# Patient Record
Sex: MALE | Race: ASIAN | NOT HISPANIC OR LATINO | Employment: FULL TIME | URBAN - METROPOLITAN AREA
[De-identification: names, ages, dates, MRNs, and addresses within clinical notes are randomized per-mention and may not be internally consistent; named-entity substitution may affect disease eponyms.]

---

## 2024-06-07 ENCOUNTER — HOSPITAL ENCOUNTER (INPATIENT)
Facility: HOSPITAL | Age: 44
LOS: 4 days | Discharge: ANOTHER HOSPITAL | DRG: 871 | End: 2024-06-11
Attending: STUDENT IN AN ORGANIZED HEALTH CARE EDUCATION/TRAINING PROGRAM | Admitting: INTERNAL MEDICINE
Payer: COMMERCIAL

## 2024-06-07 DIAGNOSIS — R74.8 ELEVATED LIVER ENZYMES: ICD-10-CM

## 2024-06-07 DIAGNOSIS — K75.0 LIVER ABSCESS: ICD-10-CM

## 2024-06-07 DIAGNOSIS — A41.9 SEPSIS, DUE TO UNSPECIFIED ORGANISM, UNSPECIFIED WHETHER ACUTE ORGAN DYSFUNCTION PRESENT: Primary | ICD-10-CM

## 2024-06-07 DIAGNOSIS — E87.20 LACTIC ACIDOSIS: ICD-10-CM

## 2024-06-07 DIAGNOSIS — R00.0 TACHYCARDIA: ICD-10-CM

## 2024-06-07 DIAGNOSIS — R50.9 FEVER: ICD-10-CM

## 2024-06-07 LAB
ACINETOBACTER CALCOACETICUS-BAUMANNII COMPLEX (OHS): NOT DETECTED
ALBUMIN SERPL-MCNC: 2.5 G/DL (ref 3.5–5)
ALBUMIN/GLOB SERPL: 0.6 RATIO (ref 1.1–2)
ALP SERPL-CCNC: 197 UNIT/L (ref 40–150)
ALT SERPL-CCNC: 98 UNIT/L (ref 0–55)
AMORPH URATE CRY URNS QL MICRO: ABNORMAL /UL
ANION GAP SERPL CALC-SCNC: 14 MEQ/L
AST SERPL-CCNC: 73 UNIT/L (ref 5–34)
BACTERIA #/AREA URNS AUTO: ABNORMAL /HPF
BACTEROIDES FRAGILIS (OHS): NOT DETECTED
BASOPHILS # BLD AUTO: 0.02 X10(3)/MCL
BASOPHILS NFR BLD AUTO: 0.6 %
BILIRUB DIRECT SERPL-MCNC: 2.5 MG/DL (ref 0–?)
BILIRUB SERPL-MCNC: 3.7 MG/DL
BILIRUB UR QL STRIP.AUTO: ABNORMAL
BUN SERPL-MCNC: 9.6 MG/DL (ref 8.9–20.6)
C AURIS DNA BLD POS QL NAA+NON-PROBE: NOT DETECTED
C GATTII+NEOFOR DNA CSF QL NAA+NON-PROBE: NOT DETECTED
CALCIUM SERPL-MCNC: 8.8 MG/DL (ref 8.4–10.2)
CANDIDA ALBICANS (OHS): NOT DETECTED
CANDIDA GLABRATA (OHS): NOT DETECTED
CANDIDA KRUSEI (OHS): NOT DETECTED
CANDIDA PARAPSILOSIS (OHS): NOT DETECTED
CANDIDA TROPICALIS (OHS): NOT DETECTED
CHLORIDE SERPL-SCNC: 102 MMOL/L (ref 98–107)
CLARITY UR: ABNORMAL
CO2 SERPL-SCNC: 16 MMOL/L (ref 22–29)
COLOR UR AUTO: ABNORMAL
CREAT SERPL-MCNC: 0.86 MG/DL (ref 0.73–1.18)
CREAT/UREA NIT SERPL: 11
CTX-M (OHS): NOT DETECTED
ENTEROBACTER CLOACAE COMPLEX (OHS): NOT DETECTED
ENTEROBACTERALES (OHS): DETECTED
ENTEROCOCCUS FAECALIS (OHS): NOT DETECTED
ENTEROCOCCUS FAECIUM (OHS): NOT DETECTED
EOSINOPHIL # BLD AUTO: 0.01 X10(3)/MCL (ref 0–0.9)
EOSINOPHIL NFR BLD AUTO: 0.3 %
ERYTHROCYTE [DISTWIDTH] IN BLOOD BY AUTOMATED COUNT: 13.8 % (ref 11.5–17)
ESCHERICHIA COLI (OHS): NOT DETECTED
FLUAV AG UPPER RESP QL IA.RAPID: NOT DETECTED
FLUBV AG UPPER RESP QL IA.RAPID: NOT DETECTED
GFR SERPLBLD CREATININE-BSD FMLA CKD-EPI: >60 ML/MIN/1.73/M2
GLOBULIN SER-MCNC: 4.4 GM/DL (ref 2.4–3.5)
GLUCOSE SERPL-MCNC: 182 MG/DL (ref 74–100)
GLUCOSE UR QL STRIP: ABNORMAL
GP B STREP DNA CSF QL NAA+NON-PROBE: NOT DETECTED
GRAM STN SPEC: NORMAL
GRAN CASTS #/AREA URNS LPF: ABNORMAL /LPF
HAEM INFLU DNA CSF QL NAA+NON-PROBE: NOT DETECTED
HCT VFR BLD AUTO: 42.1 % (ref 42–52)
HGB BLD-MCNC: 14.1 G/DL (ref 14–18)
HGB UR QL STRIP: ABNORMAL
IMM GRANULOCYTES # BLD AUTO: 0.04 X10(3)/MCL (ref 0–0.04)
IMM GRANULOCYTES NFR BLD AUTO: 1.1 %
IMP (OHS): NOT DETECTED
INR PPP: 1.1
KETONES UR QL STRIP: NEGATIVE
KLEBSIELLA AEROGENES (OHS): NOT DETECTED
KLEBSIELLA OXYTOCA (OHS): NOT DETECTED
KLEBSIELLA PNEUMONIAE GROUP (OHS): DETECTED
KPC (OHS): NOT DETECTED
L MONOCYTOG DNA CSF QL NAA+NON-PROBE: NOT DETECTED
LACTATE SERPL-SCNC: 1 MMOL/L (ref 0.5–2.2)
LACTATE SERPL-SCNC: 2.2 MMOL/L (ref 0.5–2.2)
LACTATE SERPL-SCNC: 2.5 MMOL/L (ref 0.5–2.2)
LACTATE SERPL-SCNC: 2.6 MMOL/L (ref 0.5–2.2)
LEUKOCYTE ESTERASE UR QL STRIP: NEGATIVE
LYMPHOCYTES # BLD AUTO: 0.92 X10(3)/MCL (ref 0.6–4.6)
LYMPHOCYTES NFR BLD AUTO: 25.6 %
MCH RBC QN AUTO: 30.1 PG (ref 27–31)
MCHC RBC AUTO-ENTMCNC: 33.5 G/DL (ref 33–36)
MCR-1 (OHS): NOT DETECTED
MCV RBC AUTO: 89.8 FL (ref 80–94)
MECA/C (OHS): ABNORMAL
MECA/C AND MREJ (MRSA)(OHS): ABNORMAL
MONOCYTES # BLD AUTO: 0.1 X10(3)/MCL (ref 0.1–1.3)
MONOCYTES NFR BLD AUTO: 2.8 %
MUCOUS THREADS URNS QL MICRO: ABNORMAL /LPF
N MEN DNA CSF QL NAA+NON-PROBE: NOT DETECTED
NDM (OHS): NOT DETECTED
NEUTROPHILS # BLD AUTO: 2.5 X10(3)/MCL (ref 2.1–9.2)
NEUTROPHILS NFR BLD AUTO: 69.6 %
NITRITE UR QL STRIP: NEGATIVE
NON-SQ EPI CELLS URNS QL MICRO: ABNORMAL /HPF
NRBC BLD AUTO-RTO: 0 %
OHS QRS DURATION: 126 MS
OHS QTC CALCULATION: 471 MS
OXA-48-LIKE (OHS): NOT DETECTED
PH UR STRIP: 6 [PH]
PLATELET # BLD AUTO: 143 X10(3)/MCL (ref 130–400)
PMV BLD AUTO: 10.6 FL (ref 7.4–10.4)
POTASSIUM SERPL-SCNC: 3.4 MMOL/L (ref 3.5–5.1)
PROT SERPL-MCNC: 6.9 GM/DL (ref 6.4–8.3)
PROT UR QL STRIP: ABNORMAL
PROTEUS SPP. (OHS): NOT DETECTED
PROTHROMBIN TIME: 14.3 SECONDS (ref 12.5–14.5)
PSEUDOMONAS AERUGINOSA (OHS): NOT DETECTED
RBC # BLD AUTO: 4.69 X10(6)/MCL (ref 4.7–6.1)
RBC #/AREA URNS AUTO: ABNORMAL /HPF
S ENT+BONG DNA STL QL NAA+NON-PROBE: NOT DETECTED
S PNEUM DNA CSF QL NAA+NON-PROBE: NOT DETECTED
SARS-COV-2 RNA RESP QL NAA+PROBE: NOT DETECTED
SERRATIA MARCESCENS (OHS): NOT DETECTED
SODIUM SERPL-SCNC: 132 MMOL/L (ref 136–145)
SP GR UR STRIP.AUTO: 1.02 (ref 1–1.03)
SQUAMOUS #/AREA URNS LPF: ABNORMAL /HPF
STAPHYLOCOCCUS AUREUS (OHS): NOT DETECTED
STAPHYLOCOCCUS EPIDERMIDIS (OHS): NOT DETECTED
STAPHYLOCOCCUS LUGDUNENSIS (OHS): NOT DETECTED
STAPHYLOCOCCUS SPP. (OHS): NOT DETECTED
STENOTROPHOMONAS MALTOPHILIA (OHS): NOT DETECTED
STREPTOCOCCUS PYOGENES (GROUP A)(OHS): NOT DETECTED
STREPTOCOCCUS SPP. (OHS): NOT DETECTED
TROPONIN I SERPL-MCNC: 0.01 NG/ML (ref 0–0.04)
UROBILINOGEN UR STRIP-ACNC: 4
VANA/B (OHS): ABNORMAL
VIM (OHS): NOT DETECTED
WBC # SPEC AUTO: 3.59 X10(3)/MCL (ref 4.5–11.5)
WBC #/AREA URNS AUTO: ABNORMAL /HPF

## 2024-06-07 PROCEDURE — 25500020 PHARM REV CODE 255: Performed by: STUDENT IN AN ORGANIZED HEALTH CARE EDUCATION/TRAINING PROGRAM

## 2024-06-07 PROCEDURE — 63600175 PHARM REV CODE 636 W HCPCS: Performed by: RADIOLOGY

## 2024-06-07 PROCEDURE — 87075 CULTR BACTERIA EXCEPT BLOOD: CPT | Performed by: INTERNAL MEDICINE

## 2024-06-07 PROCEDURE — 84484 ASSAY OF TROPONIN QUANT: CPT | Performed by: STUDENT IN AN ORGANIZED HEALTH CARE EDUCATION/TRAINING PROGRAM

## 2024-06-07 PROCEDURE — 25000003 PHARM REV CODE 250: Performed by: RADIOLOGY

## 2024-06-07 PROCEDURE — 63600175 PHARM REV CODE 636 W HCPCS: Performed by: PHYSICIAN ASSISTANT

## 2024-06-07 PROCEDURE — 87040 BLOOD CULTURE FOR BACTERIA: CPT | Performed by: STUDENT IN AN ORGANIZED HEALTH CARE EDUCATION/TRAINING PROGRAM

## 2024-06-07 PROCEDURE — 99223 1ST HOSP IP/OBS HIGH 75: CPT | Mod: ,,, | Performed by: GENERAL PRACTICE

## 2024-06-07 PROCEDURE — 0FB13ZX EXCISION OF RIGHT LOBE LIVER, PERCUTANEOUS APPROACH, DIAGNOSTIC: ICD-10-PCS | Performed by: RADIOLOGY

## 2024-06-07 PROCEDURE — 87102 FUNGUS ISOLATION CULTURE: CPT | Performed by: INTERNAL MEDICINE

## 2024-06-07 PROCEDURE — 25000003 PHARM REV CODE 250: Performed by: STUDENT IN AN ORGANIZED HEALTH CARE EDUCATION/TRAINING PROGRAM

## 2024-06-07 PROCEDURE — 87077 CULTURE AEROBIC IDENTIFY: CPT | Performed by: STUDENT IN AN ORGANIZED HEALTH CARE EDUCATION/TRAINING PROGRAM

## 2024-06-07 PROCEDURE — 63600175 PHARM REV CODE 636 W HCPCS: Performed by: STUDENT IN AN ORGANIZED HEALTH CARE EDUCATION/TRAINING PROGRAM

## 2024-06-07 PROCEDURE — 36415 COLL VENOUS BLD VENIPUNCTURE: CPT | Performed by: PHYSICIAN ASSISTANT

## 2024-06-07 PROCEDURE — 96365 THER/PROPH/DIAG IV INF INIT: CPT

## 2024-06-07 PROCEDURE — 0240U COVID/FLU A&B PCR: CPT | Performed by: STUDENT IN AN ORGANIZED HEALTH CARE EDUCATION/TRAINING PROGRAM

## 2024-06-07 PROCEDURE — 96367 TX/PROPH/DG ADDL SEQ IV INF: CPT

## 2024-06-07 PROCEDURE — 82248 BILIRUBIN DIRECT: CPT | Performed by: STUDENT IN AN ORGANIZED HEALTH CARE EDUCATION/TRAINING PROGRAM

## 2024-06-07 PROCEDURE — 93005 ELECTROCARDIOGRAM TRACING: CPT

## 2024-06-07 PROCEDURE — 87154 CUL TYP ID BLD PTHGN 6+ TRGT: CPT | Performed by: STUDENT IN AN ORGANIZED HEALTH CARE EDUCATION/TRAINING PROGRAM

## 2024-06-07 PROCEDURE — 87070 CULTURE OTHR SPECIMN AEROBIC: CPT | Performed by: INTERNAL MEDICINE

## 2024-06-07 PROCEDURE — 87205 SMEAR GRAM STAIN: CPT | Performed by: INTERNAL MEDICINE

## 2024-06-07 PROCEDURE — 93010 ELECTROCARDIOGRAM REPORT: CPT | Mod: ,,, | Performed by: INTERNAL MEDICINE

## 2024-06-07 PROCEDURE — 25000003 PHARM REV CODE 250: Performed by: PHYSICIAN ASSISTANT

## 2024-06-07 PROCEDURE — 85025 COMPLETE CBC W/AUTO DIFF WBC: CPT | Performed by: STUDENT IN AN ORGANIZED HEALTH CARE EDUCATION/TRAINING PROGRAM

## 2024-06-07 PROCEDURE — 99291 CRITICAL CARE FIRST HOUR: CPT

## 2024-06-07 PROCEDURE — 80053 COMPREHEN METABOLIC PANEL: CPT | Performed by: STUDENT IN AN ORGANIZED HEALTH CARE EDUCATION/TRAINING PROGRAM

## 2024-06-07 PROCEDURE — 85610 PROTHROMBIN TIME: CPT | Performed by: PHYSICIAN ASSISTANT

## 2024-06-07 PROCEDURE — 96361 HYDRATE IV INFUSION ADD-ON: CPT

## 2024-06-07 PROCEDURE — 81015 MICROSCOPIC EXAM OF URINE: CPT | Performed by: STUDENT IN AN ORGANIZED HEALTH CARE EDUCATION/TRAINING PROGRAM

## 2024-06-07 PROCEDURE — 83605 ASSAY OF LACTIC ACID: CPT | Performed by: STUDENT IN AN ORGANIZED HEALTH CARE EDUCATION/TRAINING PROGRAM

## 2024-06-07 PROCEDURE — 11000001 HC ACUTE MED/SURG PRIVATE ROOM

## 2024-06-07 PROCEDURE — 83605 ASSAY OF LACTIC ACID: CPT | Performed by: PHYSICIAN ASSISTANT

## 2024-06-07 PROCEDURE — 21400001 HC TELEMETRY ROOM

## 2024-06-07 RX ORDER — IBUPROFEN 200 MG
24 TABLET ORAL
Status: DISCONTINUED | OUTPATIENT
Start: 2024-06-07 | End: 2024-06-11 | Stop reason: HOSPADM

## 2024-06-07 RX ORDER — IBUPROFEN 600 MG/1
600 TABLET ORAL
Status: COMPLETED | OUTPATIENT
Start: 2024-06-07 | End: 2024-06-07

## 2024-06-07 RX ORDER — ACETAMINOPHEN 500 MG
1000 TABLET ORAL
Status: DISCONTINUED | OUTPATIENT
Start: 2024-06-07 | End: 2024-06-07

## 2024-06-07 RX ORDER — ACETAMINOPHEN 325 MG/1
650 TABLET ORAL EVERY 8 HOURS PRN
Status: DISCONTINUED | OUTPATIENT
Start: 2024-06-07 | End: 2024-06-11 | Stop reason: HOSPADM

## 2024-06-07 RX ORDER — POTASSIUM CHLORIDE 20 MEQ/1
40 TABLET, EXTENDED RELEASE ORAL ONCE
Status: COMPLETED | OUTPATIENT
Start: 2024-06-07 | End: 2024-06-07

## 2024-06-07 RX ORDER — TRIPROLIDINE/PSEUDOEPHEDRINE 2.5MG-60MG
600 TABLET ORAL
Status: DISCONTINUED | OUTPATIENT
Start: 2024-06-07 | End: 2024-06-07

## 2024-06-07 RX ORDER — SUCRALFATE 1 G/1
1 TABLET ORAL 4 TIMES DAILY
Status: ON HOLD | COMMUNITY
End: 2024-06-14 | Stop reason: HOSPADM

## 2024-06-07 RX ORDER — OMEPRAZOLE 40 MG/1
40 CAPSULE, DELAYED RELEASE ORAL DAILY
Status: ON HOLD | COMMUNITY
End: 2024-06-14 | Stop reason: HOSPADM

## 2024-06-07 RX ORDER — TRIPROLIDINE/PSEUDOEPHEDRINE 2.5MG-60MG
100 TABLET ORAL
Status: DISCONTINUED | OUTPATIENT
Start: 2024-06-07 | End: 2024-06-07

## 2024-06-07 RX ORDER — MIDAZOLAM HYDROCHLORIDE 2 MG/2ML
INJECTION, SOLUTION INTRAMUSCULAR; INTRAVENOUS
Status: DISPENSED
Start: 2024-06-07 | End: 2024-06-08

## 2024-06-07 RX ORDER — SODIUM CHLORIDE 0.9 % (FLUSH) 0.9 %
10 SYRINGE (ML) INJECTION EVERY 12 HOURS PRN
Status: DISCONTINUED | OUTPATIENT
Start: 2024-06-07 | End: 2024-06-11 | Stop reason: HOSPADM

## 2024-06-07 RX ORDER — IBUPROFEN 200 MG
16 TABLET ORAL
Status: DISCONTINUED | OUTPATIENT
Start: 2024-06-07 | End: 2024-06-11 | Stop reason: HOSPADM

## 2024-06-07 RX ORDER — GLUCAGON 1 MG
1 KIT INJECTION
Status: DISCONTINUED | OUTPATIENT
Start: 2024-06-07 | End: 2024-06-11 | Stop reason: HOSPADM

## 2024-06-07 RX ORDER — ENOXAPARIN SODIUM 100 MG/ML
40 INJECTION SUBCUTANEOUS EVERY 24 HOURS
Status: DISCONTINUED | OUTPATIENT
Start: 2024-06-08 | End: 2024-06-11 | Stop reason: HOSPADM

## 2024-06-07 RX ORDER — MORPHINE SULFATE 4 MG/ML
4 INJECTION, SOLUTION INTRAMUSCULAR; INTRAVENOUS ONCE
Status: COMPLETED | OUTPATIENT
Start: 2024-06-07 | End: 2024-06-08

## 2024-06-07 RX ORDER — ONDANSETRON HYDROCHLORIDE 4 MG/5ML
8 SOLUTION ORAL 3 TIMES DAILY PRN
Status: ON HOLD | COMMUNITY
End: 2024-06-14 | Stop reason: HOSPADM

## 2024-06-07 RX ORDER — FENTANYL CITRATE 50 UG/ML
INJECTION, SOLUTION INTRAMUSCULAR; INTRAVENOUS
Status: COMPLETED | OUTPATIENT
Start: 2024-06-07 | End: 2024-06-07

## 2024-06-07 RX ORDER — LIDOCAINE HYDROCHLORIDE 20 MG/ML
INJECTION, SOLUTION INFILTRATION; PERINEURAL
Status: COMPLETED | OUTPATIENT
Start: 2024-06-07 | End: 2024-06-07

## 2024-06-07 RX ORDER — MIDAZOLAM HYDROCHLORIDE 2 MG/2ML
INJECTION, SOLUTION INTRAMUSCULAR; INTRAVENOUS
Status: COMPLETED | OUTPATIENT
Start: 2024-06-07 | End: 2024-06-07

## 2024-06-07 RX ORDER — CEPHALEXIN 500 MG/1
500 CAPSULE ORAL 4 TIMES DAILY
Status: ON HOLD | COMMUNITY
End: 2024-06-11 | Stop reason: HOSPADM

## 2024-06-07 RX ORDER — FENTANYL CITRATE 50 UG/ML
INJECTION, SOLUTION INTRAMUSCULAR; INTRAVENOUS
Status: DISPENSED
Start: 2024-06-07 | End: 2024-06-08

## 2024-06-07 RX ADMIN — VANCOMYCIN HYDROCHLORIDE 2000 MG: 500 INJECTION, POWDER, LYOPHILIZED, FOR SOLUTION INTRAVENOUS at 07:06

## 2024-06-07 RX ADMIN — SODIUM CHLORIDE, POTASSIUM CHLORIDE, SODIUM LACTATE AND CALCIUM CHLORIDE 3000 ML: 600; 310; 30; 20 INJECTION, SOLUTION INTRAVENOUS at 03:06

## 2024-06-07 RX ADMIN — IOHEXOL 100 ML: 350 INJECTION, SOLUTION INTRAVENOUS at 05:06

## 2024-06-07 RX ADMIN — PIPERACILLIN AND TAZOBACTAM 4.5 G: 4; .5 INJECTION, POWDER, LYOPHILIZED, FOR SOLUTION INTRAVENOUS; PARENTERAL at 06:06

## 2024-06-07 RX ADMIN — LIDOCAINE HYDROCHLORIDE 5 ML: 20 INJECTION, SOLUTION INFILTRATION; PERINEURAL at 01:06

## 2024-06-07 RX ADMIN — PIPERACILLIN SODIUM AND TAZOBACTAM SODIUM 4.5 G: 4; .5 INJECTION, POWDER, LYOPHILIZED, FOR SOLUTION INTRAVENOUS at 02:06

## 2024-06-07 RX ADMIN — PIPERACILLIN SODIUM AND TAZOBACTAM SODIUM 4.5 G: 4; .5 INJECTION, POWDER, LYOPHILIZED, FOR SOLUTION INTRAVENOUS at 09:06

## 2024-06-07 RX ADMIN — IBUPROFEN 600 MG: 600 TABLET, FILM COATED ORAL at 03:06

## 2024-06-07 RX ADMIN — MIDAZOLAM HYDROCHLORIDE 0.5 MG: 1 INJECTION, SOLUTION INTRAMUSCULAR; INTRAVENOUS at 12:06

## 2024-06-07 RX ADMIN — FENTANYL CITRATE 25 MCG: 50 INJECTION, SOLUTION INTRAMUSCULAR; INTRAVENOUS at 12:06

## 2024-06-07 RX ADMIN — ACETAMINOPHEN 650 MG: 325 TABLET, FILM COATED ORAL at 05:06

## 2024-06-07 RX ADMIN — POTASSIUM CHLORIDE 40 MEQ: 1500 TABLET, EXTENDED RELEASE ORAL at 09:06

## 2024-06-07 NOTE — H&P
Radiology History & Physical      SUBJECTIVE:     Chief Complaint: Liver Lesion    History of Present Illness:  Nba Montoya is a 43 y.o. male who presents for Biopsy  No past medical history on file.  No past surgical history on file.    Home Meds:   Prior to Admission medications    Medication Sig Start Date End Date Taking? Authorizing Provider   cephALEXin (KEFLEX) 500 MG capsule Take 500 mg by mouth 4 (four) times daily. For 5 days    Provider, Historical   omeprazole (PRILOSEC) 40 MG capsule Take 40 mg by mouth once daily.    Provider, Historical   ondansetron (ZOFRAN) 4 mg/5 mL solution Take 8 mg by mouth 3 (three) times daily as needed for Nausea.    Provider, Historical   sucralfate (CARAFATE) 1 gram tablet Take 1 g by mouth 4 (four) times daily.    Provider, Historical     Anticoagulants/Antiplatelets: no anticoagulation    Allergies: Review of patient's allergies indicates:  No Known Allergies  Sedation History:  no adverse reactions    Review of Systems:   Hematological: no known coagulopathies  Respiratory: no shortness of breath  Cardiovascular: no chest pain  Gastrointestinal: no abdominal pain  Genito-Urinary: no dysuria  Musculoskeletal: negative  Neurological: no TIA or stroke symptoms         OBJECTIVE:     Vital Signs (Most Recent)  Temp: 98.8 °F (37.1 °C) (06/07/24 1118)  Pulse: 94 (06/07/24 1102)  Resp: 20 (06/07/24 1102)  BP: 113/81 (06/07/24 1102)  SpO2: 95 % (06/07/24 1102)    Physical Exam:  ASA: 2    General: no acute distress  Mental Status: alert and oriented to person, place and time  HEENT: normocephalic, atraumatic  Chest: unlabored breathing  Heart: regular heart rate  Abdomen: nondistended  Extremity: moves all extremities    Laboratory  Lab Results   Component Value Date    INR 1.1 06/07/2024       Lab Results   Component Value Date    WBC 3.59 (L) 06/07/2024    HGB 14.1 06/07/2024    HCT 42.1 06/07/2024    MCV 89.8 06/07/2024     06/07/2024      Lab Results   Component Value  Date     (L) 06/07/2024    K 3.4 (L) 06/07/2024     06/07/2024    CO2 16 (L) 06/07/2024    BUN 9.6 06/07/2024    CREATININE 0.86 06/07/2024    CALCIUM 8.8 06/07/2024    ALT 98 (H) 06/07/2024    AST 73 (H) 06/07/2024    ALBUMIN 2.5 (L) 06/07/2024    BILITOT 3.7 (H) 06/07/2024    BILIDIR 2.5 (H) 06/07/2024       ASSESSMENT/PLAN:     Sedation Plan: moderate  Patient will undergo US Guided Liver Biopsy.    Kvng Yu MD

## 2024-06-07 NOTE — ED PROVIDER NOTES
Encounter Date: 6/7/2024    SCRIBE #1 NOTE: I, Kim Zheng, am scribing for, and in the presence of,  Oscar Castle MD. I have scribed the following portions of the note - Other sections scribed: HPI, ROS, PE.       History     Chief Complaint   Patient presents with    Fever     Presents with c/o fever since Sunday. Seen at  on Tuesday. Started on antibiotics. Last APAP at 0130. Ibuprofen @ 2100. Pt states that urine remains cloudy    Abdominal Pain     Onset of abdominal pain on Tuesday. Scheduled for abdominal U/S today.      Patient is a 43-year-old male presenting to the ED with c/o fever. The pt reports epigastric abdominal pain and fever onset Sunday. He notes that he presented to an urgent care on Monday and was placed on Keflex without improvement. He denies any nausea, vomiting, diarrhea, cough, or rhinorrhea. He has been treating his symptoms with tylenol with the last dose at 0130.  No travel outside the country.    The history is provided by the patient. No  was used.     Review of patient's allergies indicates:  No Known Allergies  No past medical history on file.  No past surgical history on file.  No family history on file.     Review of Systems   Constitutional:  Positive for fever.   HENT:  Negative for rhinorrhea and sore throat.    Eyes:  Negative for visual disturbance.   Respiratory:  Negative for cough and shortness of breath.    Cardiovascular:  Negative for chest pain.   Gastrointestinal:  Positive for abdominal pain (epigastric). Negative for diarrhea, nausea and vomiting.   Genitourinary:  Negative for dysuria.   Musculoskeletal:  Negative for joint swelling.   Skin:  Negative for rash.   Neurological:  Negative for weakness.   Psychiatric/Behavioral:  Negative for confusion.    All other systems reviewed and are negative.      Physical Exam     Initial Vitals [06/07/24 0334]   BP Pulse Resp Temp SpO2   128/70 (!) 146 (!) 26 (!) 103 °F (39.4 °C) 96 %      MAP        --         Physical Exam    Nursing note and vitals reviewed.  Constitutional: He appears well-developed and well-nourished. He is not diaphoretic. No distress.   HENT:   Head: Normocephalic and atraumatic.   Eyes: Conjunctivae and EOM are normal. Pupils are equal, round, and reactive to light.   Neck:   Normal range of motion.  Cardiovascular:  Regular rhythm, normal heart sounds and intact distal pulses.   Tachycardia present.         No murmur heard.  Pulmonary/Chest: Breath sounds normal. No respiratory distress. He has no wheezes. He has no rales.   Abdominal: Abdomen is soft. He exhibits no distension.   Mild abdominal tenderness to palpation.   Musculoskeletal:         General: No tenderness or edema. Normal range of motion.      Cervical back: Normal range of motion.     Neurological: He is alert and oriented to person, place, and time. No cranial nerve deficit.   Skin: Skin is warm and dry. Capillary refill takes less than 2 seconds. No rash noted. No erythema.   Psychiatric: He has a normal mood and affect.         ED Course   Critical Care    Date/Time: 6/7/2024 4:30 AM    Performed by: Oscar Castle MD  Authorized by: Oscar Castle MD  Direct patient critical care time: 12 minutes  Additional history critical care time: 8 minutes  Ordering / reviewing critical care time: 6 minutes  Documentation critical care time: 5 minutes  Consulting other physicians critical care time: 5 minutes  Total critical care time (exclusive of procedural time) : 36 minutes  Critical care time was exclusive of separately billable procedures and treating other patients and teaching time.  Critical care was necessary to treat or prevent imminent or life-threatening deterioration of the following conditions: sepsis and hepatic failure.  Critical care was time spent personally by me on the following activities: development of treatment plan with patient or surrogate, discussions with consultants, interpretation of cardiac  output measurements, evaluation of patient's response to treatment, examination of patient, obtaining history from patient or surrogate, ordering and performing treatments and interventions, ordering and review of laboratory studies, ordering and review of radiographic studies, pulse oximetry, re-evaluation of patient's condition and review of old charts.        Labs Reviewed   COMPREHENSIVE METABOLIC PANEL - Abnormal; Notable for the following components:       Result Value    Sodium 132 (*)     Potassium 3.4 (*)     CO2 16 (*)     Glucose 182 (*)     Albumin 2.5 (*)     Globulin 4.4 (*)     Albumin/Globulin Ratio 0.6 (*)     Bilirubin Total 3.7 (*)      (*)     ALT 98 (*)     AST 73 (*)     All other components within normal limits   URINALYSIS, REFLEX TO URINE CULTURE - Abnormal; Notable for the following components:    Appearance, UA Turbid (*)     Protein, UA 2+ (*)     Glucose, UA Trace (*)     Blood, UA 2+ (*)     Bilirubin, UA 2+ (*)     Urobilinogen, UA 4.0 (*)     Mucous, UA Trace (*)     Granular Casts 3-5 (*)     RBC, UA 6-10 (*)     All other components within normal limits   LACTIC ACID, PLASMA - Abnormal; Notable for the following components:    Lactic Acid Level 2.6 (*)     All other components within normal limits   CBC WITH DIFFERENTIAL - Abnormal; Notable for the following components:    WBC 3.59 (*)     RBC 4.69 (*)     MPV 10.6 (*)     All other components within normal limits   LACTIC ACID, PLASMA - Abnormal; Notable for the following components:    Lactic Acid Level 2.5 (*)     All other components within normal limits   BILIRUBIN, DIRECT - Abnormal; Notable for the following components:    Bilirubin Direct 2.5 (*)     All other components within normal limits   COVID/FLU A&B PCR - Normal    Narrative:     The Xpert Xpress SARS-CoV-2/FLU/RSV plus is a rapid, multiplexed real-time PCR test intended for the simultaneous qualitative detection and differentiation of SARS-CoV-2, Influenza A,  Influenza B, and respiratory syncytial virus (RSV) viral RNA in either nasopharyngeal swab or nasal swab specimens.         TROPONIN I - Normal   PROTIME-INR - Normal   ANAEROBIC CULTURE OLG   FUNGAL CULTURE OLG   TISSUE CULTURE - AEROBIC [facility]   CBC W/ AUTO DIFFERENTIAL    Narrative:     The following orders were created for panel order CBC auto differential.  Procedure                               Abnormality         Status                     ---------                               -----------         ------                     CBC with Differential[[id number]]       Abnormal            Final result                 Please view results for these tests on the individual orders.   SPECIMEN TO PATHOLOGY     EKG Readings: (Independently Interpreted)   Initial Reading: No STEMI. Rhythm: Sinus Tachycardia. Heart Rate: 141. Ectopy: No Ectopy. Conduction: RBBB. ST Segments: Normal ST Segments. T Waves: Normal. Axis: Normal.   0330     ECG Results              EKG 12-lead (Final result)        Collection Time Result Time QRS Duration OHS QTC Calculation    06/07/24 03:30:29 06/07/24 11:00:11 126 471                     Final result by Interface, Lab In [facility] (06/07/24 11:00:18)                   Narrative:    Test Reason : R00.0,    Vent. Rate : 141 BPM     Atrial Rate : 141 BPM     P-R Int : 128 ms          QRS Dur : 126 ms      QT Int : 308 ms       P-R-T Axes : 068 154 019 degrees     QTc Int : 471 ms    Program found technically poor ECG  Sinus tachycardia  Right bundle branch block  Abnormal ECG  When compared with ECG of 07-JUN-2024 03:28,  No significant change was found  Confirmed by Reid Langford MD ([id number]) on 6/7/2024 11:00:09 AM    Referred By: [email or web address]   SELF           Confirmed By:Reid Langford MD                                  Imaging Results              CT Biopsy Liver (xpd) (Edited Result - FINAL)  Result time 06/07/24 14:59:53      Addendum (preliminary) 1 of 1 by Kvng Yu MD (06/07/24 14:59:53)       The above report is 4 drainage.  Correct report below.    Examination: IR biopsy    Clinical history: Hepatic lesions which may be infectious.  Malignancy not excluded    Technique: CT-guided biopsy of right hepatic lesion.    DLP: 418    Comparison: CT from earlier.    Findings: The risk and benefits were explained to the patient expressed understanding.  The soft tissues were anesthetized with a lidocaine solution.  Under CT guidance a needle was advanced to the right hepatic lesion.  Two biopsies were obtained.  The pathologist stated predominantly neutrophils.  The second biopsy was sent to microbiology for culture.    Impression: Successful biopsy.      Electronically signed by: Kvng Yu  Date:    06/07/2024  Time:    14:59                 Final result by Kvng Yu MD (06/07/24 14:54:02)                   Impression:      Successful placement of a 12 Malay drain into the right hepatic abscess.  Samples were collected and sent to the lab for analysis.      Electronically signed by: Kvng Yu  Date:    06/07/2024  Time:    14:54               Narrative:    EXAMINATION:  CT BIOPSY LIVER (XPD)    CLINICAL HISTORY:  liver lesion/infectious process;    ANESTHESIA: Lidocaine was utilized for local anesthesia.    TECHNIQUE:  CT guided drainage of a right hepatic collection.    DLP: 418    COMPARISON:  No relevant prior available for comparison.    FINDINGS:  The risks, benefits, and potential complications were discussed including bleeding, infection, end organ damage. Informed consent was obtained.    The patient was positioned in the left lateral decubitus position. The right hepatic abscess was localized CT.  The skin was marked and the area was prepped and draped in a sterile fashion.    The site was anesthetized with 1% lidocaine solution. A 19 gauge, 5 cm coaxial needle was advanced into the collection, and a guidewire was then placed through the needle. The track was then dilated and a  12 [place] drain was placed. The drain was sutured to the skin and attached to a KARINA bulb. There was 120 cc of pus fluid drained.    The patient tolerated the procedure without difficulty. Post-procedure imaging demonstrated a well-positioned catheter.  There were no immediate post-procedure complications. The patient was discharged to his room in stable condition.                                       CT Abdomen Pelvis With IV Contrast NO Oral Contrast (Final result)  Result time 06/07/24 11:43:53      Final result by Ivan Mckeon MD (06/07/24 11:43:53)                   Impression:      Hypoattenuating liver lesions could relate to metastatic disease or infection.      Electronically signed by: Ivan Mckeon  Date:    06/07/2024  Time:    11:43               Narrative:    EXAMINATION:  CT ABDOMEN PELVIS WITH IV CONTRAST    CLINICAL HISTORY:  RUQ pain;    TECHNIQUE:  Helical acquisition through the abdomen and pelvis with IV contrast.  Three plane reconstructions were provided for review.  mGycm. Automatic exposure control, adjustment of mA/kV or iterative reconstruction technique was used to reduce radiation.    COMPARISON:  No prior    FINDINGS:  There is bibasilar atelectasis or scarring.    There are 3 hypoattenuating liver lesions.  The largest is in the caudate measuring up to about 5 cm.  Patent portal vein.    No significant abnormality of the gallbladder, spleen, pancreas or adrenals.  No hydronephrosis or suspicious renal lesion.    No bowel obstruction.  Normal appendix.  Some rectal wall thickening is favored related to under distension.  No significantly enlarged mesenteric lymph nodes.    Urinary bladder not well distended.  No pelvic free fluid.  Abdominal aorta normal in caliber.    There are no acute osseous findings.                        Preliminary result by Holden Nunez Jr., MD (06/07/24 05:49:52)                   Impression:    1. There are a few irregular well defined low  attenuation lesions seen in the right and caudate lobes of the liver, largest measuring 5.5 x 5.3 cm in the caudate lobe of the liver as seen on image 33 series 2. Considerations include necrotic masses and abscesses. Additional evaluation advised.  2. Details and other findings as discussed above.               Narrative:    START OF REPORT:  Technique: CT of the abdomen and pelvis was performed with axial images as well as sagittal and coronal reconstruction images with intravenous contrast.    Comparison: None available.    Clinical History: Ruq pain, Presents with c/o fever since Sunday. Seen at  on Tuesday. Started on antibiotics. Last APAP at 0130. Ibuprofen @ 2100. Pt states that urine remains cloudy.    Dosage Information: Automated Exposure Control was utilized.    Findings:  Lines and Tubes: None.  Thorax:  Lungs: There is mild nonspecific dependent change at the lung bases.  Pleura: No effusions or thickening.  Heart: The heart size is within normal limits.  Abdomen:  Abdominal Wall: There small uncomplicated umbilical hernia which contains mesenteric fat.  Liver: There are a few irregular well defined low attenuation lesions seen in the right and caudate lobes of the liver, largest measuring 5.5 x 5.3 cm in the caudate lobe of the liver as seen on image 33 series 2. Considerations include necrotic masses and abscesses. Additional evaluation advised.  Biliary System: No intrahepatic or extrahepatic biliary duct dilatation is seen.  Gallbladder: The gallbladder appears unremarkable.  Pancreas: The pancreas appears unremarkable.  Spleen: The spleen appears unremarkable.  Adrenals: The adrenal glands appear unremarkable.  Kidneys: The kidneys appear unremarkable with no stones cysts masses or hydronephrosis.  Aorta: The abdominal aorta appears unremarkable.  IVC: Unremarkable.  Bowel:  Esophagus: The visualized esophagus appears unremarkable.  Stomach: The stomach appears unremarkable.  Duodenum:  Unremarkable appearing duodenum.  Small Bowel: The small bowel appears unremarkable.  Colon: There is moderate stool in the colon which could reflect an element of constipation. A few diverticula are seen in the descending colon. No associated inflammatory stranding or pericolonic fluid is seen to suggest diverticulitis.  Appendix: The appendix appears unremarkable seen on Image 93, Series 2 through Image 99, Series 2.  Peritoneum: No intraperitoneal free air or ascites is seen.    Pelvis:  Bladder: The bladder appears unremarkable.  Male:  Prostate gland: The prostate gland appears unremarkable.    Bony structures:  Dorsal Spine: There is mild multilevel spondylosis of the visualized dorsal spine.  Bony Pelvis: The visualized bony structures of the pelvis appear unremarkable.                                         X-Ray Chest AP Portable (Final result)  Result time 06/07/24 06:00:22      Final result by Ivan Mckeon MD (06/07/24 06:00:22)                   Impression:      No acute findings.      Electronically signed by: Ivan Mckeon  Date:    06/07/2024  Time:    06:00               Narrative:    EXAMINATION:  XR CHEST AP PORTABLE    CLINICAL HISTORY:  Fever, unspecified    COMPARISON:  No priors    FINDINGS:  Frontal view of the chest was obtained. The heart is not enlarged.  Lungs are grossly clear.  There is no pneumothorax or significant effusion.                                       Medications   sodium chloride 0.9% flush 10 mL (has no administration in time range)   acetaminophen tablet 650 mg (650 mg Oral Given 6/7/24 1707)   glucose chewable tablet 16 g (has no administration in time range)   glucose chewable tablet 24 g (has no administration in time range)   glucagon (human recombinant) injection 1 mg (has no administration in time range)   enoxaparin injection 40 mg (has no administration in time range)   dextrose 10% bolus 125 mL 125 mL (has no administration in time range)   dextrose 10% bolus  250 mL 250 mL (has no administration in time range)   fentaNYL (SUBLIMAZE) 50 mcg/mL injection (has no administration in time range)   midazolam (PF) (VERSED) 1 mg/mL injection (has no administration in time range)   morphine injection 4 mg (has no administration in time range)   ceFEPIme (MAXIPIME) 2 g in dextrose 5 % in water (D5W) 100 mL IVPB (MB+) (has no administration in time range)   lactated ringers bolus 3,000 mL (0 mLs Intravenous Stopped 6/7/24 0600)   ibuprofen tablet 600 mg (600 mg Oral Given 6/7/24 0351)   iohexoL (OMNIPAQUE 350) injection 100 mL (100 mLs Intravenous Given 6/7/24 0511)   vancomycin 2 g in dextrose 5 % 500 mL IVPB (0 mg Intravenous Stopped 6/7/24 0905)   piperacillin-tazobactam (ZOSYN) 4.5 g in dextrose 5 % in water (D5W) 100 mL IVPB (MB+) (0 g Intravenous Stopped 6/7/24 0644)   potassium chloride SA CR tablet 40 mEq (40 mEq Oral Given 6/7/24 0945)   midazolam (PF) (VERSED) 1 mg/mL injection (0.5 mg Intravenous Given 6/7/24 1259)   fentaNYL 50 mcg/mL injection (25 mcg Intravenous Given 6/7/24 1259)   LIDOcaine HCL 20 mg/ml (2%) injection (5 mLs Other Given 6/7/24 1301)     Medical Decision Making  Problems Addressed:  Elevated liver enzymes: acute illness or injury that poses a threat to life or bodily functions  Fever: acute illness or injury that poses a threat to life or bodily functions  Lactic acidosis: acute illness or injury that poses a threat to life or bodily functions  Liver abscess: acute illness or injury that poses a threat to life or bodily functions  Sepsis, due to unspecified organism, unspecified whether acute organ dysfunction present: acute illness or injury that poses a threat to life or bodily functions  Tachycardia: acute illness or injury that poses a threat to life or bodily functions    Amount and/or Complexity of Data Reviewed  External Data Reviewed: notes.     Details: Reviewed old records  Labs: ordered.  Radiology: ordered and independent interpretation  performed.    Risk  Prescription drug management.  Parenteral controlled substances.  Decision regarding hospitalization.            Scribe Attestation:   Scribe #1: I performed the above scribed service and the documentation accurately describes the services I performed. I attest to the accuracy of the note.    Attending Attestation:           Physician Attestation for Scribe:  Physician Attestation Statement for Scribe #1: I, Oscar Castle MD, reviewed documentation, as scribed by Kim Calzada in my presence, and it is both accurate and complete.                        Medical Decision Making:   History:   I obtained history from: someone other than patient.       <> Summary of History: Collateral from family.  Old Medical Records: I decided to obtain old medical records.  Old Records Summarized: records from clinic visits, records from previous admission(s) and records from another hospital.       <> Summary of Records: Reviewed old records  Initial Assessment:   Fever  Differential Diagnosis:   Judging by the patient's chief complaint and pertinent history, the patient has the following possible differential diagnoses, including but not limited to the following.  Some of these are deemed to be lower likelihood and some more likely based on my physical exam and history combined with possible lab work and/or imaging studies.   Please see the pertinent studies, and refer to the HPI.  Some of these diagnoses will take further evaluation to fully rule out, perhaps as an outpatient and the patient was encouraged to follow up when discharged for more comprehensive evaluation.    Viral syndrome, COVID, flu, otitis media UTI, intraabdominal infection, bacterial pharyngitis, pneumonia, sepsis, pyelonephritis, cellulitis, abscess, ENT infection,     Independently Interpreted Test(s):   I have ordered and independently interpreted X-rays - see prior notes.  I have ordered and independently interpreted EKG Reading(s) - see  "prior notes  Clinical Tests:   Lab Tests: Ordered and Reviewed  Radiological Study: Ordered and Reviewed  Medical Tests: Ordered and Reviewed  Sepsis Perfusion Assessment: "I attest a sepsis perfusion exam was performed within 6 hours of sepsis, severe sepsis, or septic shock presentation, following fluid resuscitation."    Sepsis Perfusion Assessment Complete: 6/7/2024 5:30 AM    ED Management:  Patient is a 43-year-old who presents to the emergency department for fever.  See HPI.  See physical exam.  Complains of some abdominal pain.  Labs and imaging obtained.  Lab work for elevated liver enzymes.  Imaging obtained showing blood cultures have been obtained.  Patient has been started on broad-spectrum antibiotics.  Received 30 cc heart rate, lactic acidosis.  All results have been discussed with the patient.  Discussed need for admission for further evaluation management treatment.  Patient verbalized understanding and agreed to plan.  Other:   I have discussed this case with another health care provider.       <> Summary of the Discussion: [facility] who will admit the patient.             Clinical Impression:  Final diagnoses:  [R00.0] Tachycardia  [R50.9] Fever  [A41.9] Sepsis, due to unspecified organism, unspecified whether acute organ dysfunction present (Primary)  [K75.0] Liver abscess  [R74.8] Elevated liver enzymes  [E87.20] Lactic acidosis          ED Disposition Condition    Admit Stable                Oscar Castle MD  06/08/24 0009       Oscar Castle MD  06/08/24 0050    "

## 2024-06-07 NOTE — PLAN OF CARE
Problem: Adult Inpatient Plan of Care  Goal: Plan of Care Review  Outcome: Progressing  Goal: Patient-Specific Goal (Individualized)  Outcome: Progressing  Goal: Absence of Hospital-Acquired Illness or Injury  Outcome: Progressing  Goal: Optimal Comfort and Wellbeing  Outcome: Progressing  Goal: Readiness for Transition of Care  Outcome: Progressing     Problem: Wound  Goal: Optimal Coping  Outcome: Progressing  Goal: Optimal Functional Ability  Outcome: Progressing  Goal: Absence of Infection Signs and Symptoms  Outcome: Progressing  Goal: Improved Oral Intake  Outcome: Progressing  Goal: Optimal Pain Control and Function  Outcome: Progressing  Goal: Skin Health and Integrity  Outcome: Progressing  Goal: Optimal Wound Healing  Outcome: Progressing      2/6/murmur/positive S2/positive S1

## 2024-06-07 NOTE — NURSING
Nurses Note -- 4 Eyes      6/7/2024   5:25 PM      Skin assessed during: Admit      [x] No Altered Skin Integrity Present    []Prevention Measures Documented      [] Yes- Altered Skin Integrity Present or Discovered   [] LDA Added if Not in Epic (Describe Wound)   [] New Altered Skin Integrity was Present on Admit and Documented in LDA   [] Wound Image Taken    Wound Care Consulted? No    Attending Nurse:  Candice Mosqueda RN/Staff Member:  Demetra

## 2024-06-07 NOTE — H&P
[facility] General  Emergency [facility] Medicinet  [facility] MEDICINE - H&P ADMISSION NOTE      Patient Name: Nba Montoya  MRN: [id number]  Patient Class: IP- Inpatient   Admission Date: 6/7/2024   Admitting Physician: BRIANDA Service   Attending Physician: Shmuel Castle MD  Primary Care Provider: Jes Primary Doctor  Face-to-Face encounter date: 06/07/2024        CHIEF COMPLAINT     Chief Complaint   Patient presents with    Fever     Presents with c/o fever since Sunday. Seen at  on Tuesday. Started on antibiotics. Last APAP at 0130. Ibuprofen @ 2100. Pt states that urine remains cloudy    Abdominal Pain     Onset of abdominal pain on Tuesday. Scheduled for abdominal U/S today.        HISTORY OF PRESENTING ILLNESS   43-year-old male with a history of hyperlipidemia currently on fish oil.  He is coming in with complaints of fevers for 5 days.  He was prescribed cephalexin for possible UTI were continued to have fevers.  Also has right upper quadrant abdominal pain.  Denies eating any raw food, no nausea or vomiting, no diarrhea, no dysuria.  He states that the last time he went to the [facility] was in 2019 which is where he is originally from.  Currently works as a  in [place].  Upon arrival he is having fever of 103 F, tachycardic 140s.  No leukocytosis.  CT imaging shows few irregular defined right and caudate lobe liver lesions concerning for necrotic mass versus abscess.        PAST MEDICAL HISTORY   No past medical history on file.    PAST SURGICAL HISTORY   No past surgical history on file.    FAMILY HISTORY   Reviewed and noncontributory to this case    SOCIAL HISTORY     Social History     Socioeconomic History    Marital status:      Screening for Social Drivers for health: Patient screened for food insecurity, housing instability, transportation needs, utility   difficulties, and interpersonal safety (select all that apply as identified as concern)  []Housing or Food  []Transportation  Needs  []Utility Difficulties  []Interpersonal safety  [x]None    HOME MEDICATIONS     Prior to Admission medications    Medication Sig Start Date End Date Taking? Authorizing Provider   cephALEXin (KEFLEX) 500 MG capsule Take 500 mg by mouth 4 (four) times daily. For 5 days    Provider, Historical   omeprazole (PRILOSEC) 40 MG capsule Take 40 mg by mouth once daily.    Provider, Historical   ondansetron (ZOFRAN) 4 mg/5 mL solution Take 8 mg by mouth 3 (three) times daily as needed for Nausea.    Provider, Historical   sucralfate (CARAFATE) 1 gram tablet Take 1 g by mouth 4 (four) times daily.    Provider, Historical       ALLERGIES   Patient has no known allergies.          REVIEW OF SYSTEMS   Except as documented above, all other systems reviewed and negative    PHYSICAL EXAM     Vitals:    06/07/24 0902   BP: 112/76   Pulse: 97   Resp: (!) 24   Temp:       General:  In no acute distress, resting comfortably  Head and neck:  Atraumatic, normocephalic, moist mucous membranes, supple neck  Chest:  Clear to auscultation bilaterally  Heart:  S1, S2, no appreciable murmur  Abdomen:  Soft, minimal right upper quadrant tenderness, BS +  MSK:  Warm, no lower extremity edema, no clubbing or cyanosis  Neuro:  Alert and oriented x4, moving all extremities with good strength  Integumentary:  No obvious skin rash  Psychiatry:  Appropriate mood and affect          ASSESSMENT AND PLAN   Pyogenic liver abscesses  Sepsis secondary to the above    History of:  HLD      Start Zosyn, follow blood cultures  Consult Radiology for drain placement evaluation  Consult Infectious Disease, will probably end up needing a long course of antibiotics    DVT prophylaxis:      Critical care time spent: 60 minutes on sepsis secondary to liver abscess  __________________________________________________________________  LABS/MICRO/MEDS/DIAGNOSTICS       LABS  Recent Labs     06/07/24  0406   *   K 3.4*   CO2 16*   BUN 9.6   CREATININE 0.86    GLUCOSE 182*   CALCIUM 8.8   ALKPHOS 197*   AST 73*   ALT 98*   ALBUMIN 2.5*     Recent Labs     06/07/24 0406   WBC 3.59*   RBC 4.69*   HCT 42.1   MCV 89.8          MICROBIOLOGY  Microbiology Results (last 7 days)       Procedure Component Value Units Date/Time    Blood culture #2 **CANNOT BE ORDERED STAT** [[id number]] Collected: 06/07/24 0406    Order Status: Resulted Specimen: Blood Updated: 06/07/24 0513    Blood culture #1 **CANNOT BE ORDERED STAT** [[id number]] Collected: 06/07/24 0406    Order Status: Resulted Specimen: Blood Updated: 06/07/24 0513            MEDICATIONS   [START ON 6/8/2024] enoxparin  40 mg Subcutaneous Daily    piperacillin-tazobactam (Zosyn) IV (PEDS and ADULTS) (extended infusion is not appropriate)  4.5 g Intravenous Q8H    potassium chloride  40 mEq Oral Once      INFUSIONS      DIAGNOSTIC TESTS  CT Abdomen Pelvis With IV Contrast NO Oral Contrast         X-Ray Chest AP Portable   Final Result      No acute findings.         Electronically signed by: Ivan Mckeon   Date:    06/07/2024   Time:    06:00             Patient information was obtained from patient, patient's family, past medical records and ER records.   All diagnosis and differential diagnosis have been reviewed; assessment and plan has been documented. I have personally reviewed the labs and test results that are presently available; I have reviewed the patients medication list. I have reviewed the consulting providers response and recommendations. I have reviewed or attempted to review medical records based upon their availability.  All of the patient's questions have been addressed and answered. Patient's is agreeable to the above stated plan. I will continue to monitor closely and make adjustments to medical management as needed.  This note was created using [employer] voice recognition software that occasionally misinterpreted phrases or words.  Please contact me if any questions may rise regarding documentation  to clarify verbiage.        Shmuel Castle MD   Internal Medicine  Department of Hospital Medicine [facility] - Emergency Dept

## 2024-06-07 NOTE — DISCHARGE SUMMARY
Radiology Post-Procedure Note    Pre Op Diagnosis: right liver mass    Post Op Diagnosis: right liver mass    Procedure: right liver biopsy    Procedure performed by: Ernesto    Written Informed Consent Obtained: Yes    Specimen Removed: YES     Estimated Blood Loss: Minimal    Findings:   Standard US Liver Biopsy    Patient tolerated procedure well.    @SIG@

## 2024-06-07 NOTE — OP NOTE
Radiology Post-Procedure Note    Pre Op Diagnosis: right liver mass    Post Op Diagnosis: right liver mass    Procedure: right liver biopsy    Procedure performed by: Aimee    Written Informed Consent Obtained: Yes    Specimen Removed: YES     Estimated Blood Loss: Minimal    Findings:   Standard CT Biopsy      Patient tolerated procedure well.    @SIG@

## 2024-06-08 LAB
ABS NEUT (OLG): 2.92 X10(3)/MCL (ref 2.1–9.2)
ALBUMIN SERPL-MCNC: 2.3 G/DL (ref 3.5–5)
ALBUMIN/GLOB SERPL: 0.7 RATIO (ref 1.1–2)
ALP SERPL-CCNC: 179 UNIT/L (ref 40–150)
ALT SERPL-CCNC: 103 UNIT/L (ref 0–55)
ANION GAP SERPL CALC-SCNC: 12 MEQ/L
ANISOCYTOSIS BLD QL SMEAR: ABNORMAL
AST SERPL-CCNC: 80 UNIT/L (ref 5–34)
BASOPHILS NFR BLD MANUAL: 0.04 X10(3)/MCL (ref 0–0.2)
BASOPHILS NFR BLD MANUAL: 1 %
BILIRUB SERPL-MCNC: 3.8 MG/DL
BUN SERPL-MCNC: 12.1 MG/DL (ref 8.9–20.6)
C TRACH DNA SPEC QL NAA+PROBE: NOT DETECTED
CALCIUM SERPL-MCNC: 8.1 MG/DL (ref 8.4–10.2)
CHLORIDE SERPL-SCNC: 105 MMOL/L (ref 98–107)
CO2 SERPL-SCNC: 18 MMOL/L (ref 22–29)
CREAT SERPL-MCNC: 0.88 MG/DL (ref 0.73–1.18)
CREAT/UREA NIT SERPL: 14
EOSINOPHIL NFR BLD MANUAL: 0.04 X10(3)/MCL (ref 0–0.9)
EOSINOPHIL NFR BLD MANUAL: 1 %
ERYTHROCYTE [DISTWIDTH] IN BLOOD BY AUTOMATED COUNT: 14.2 % (ref 11.5–17)
GFR SERPLBLD CREATININE-BSD FMLA CKD-EPI: >60 ML/MIN/1.73/M2
GLOBULIN SER-MCNC: 3.5 GM/DL (ref 2.4–3.5)
GLUCOSE SERPL-MCNC: 163 MG/DL (ref 74–100)
HCT VFR BLD AUTO: 38.5 % (ref 42–52)
HGB BLD-MCNC: 13 G/DL (ref 14–18)
INSTRUMENT WBC (OLG): 4.3 X10(3)/MCL
LYMPHOCYTES NFR BLD MANUAL: 1.25 X10(3)/MCL
LYMPHOCYTES NFR BLD MANUAL: 29 %
MACROCYTES BLD QL SMEAR: ABNORMAL
MCH RBC QN AUTO: 29.5 PG (ref 27–31)
MCHC RBC AUTO-ENTMCNC: 33.8 G/DL (ref 33–36)
MCV RBC AUTO: 87.5 FL (ref 80–94)
MONOCYTES NFR BLD MANUAL: 0.04 X10(3)/MCL (ref 0.1–1.3)
MONOCYTES NFR BLD MANUAL: 1 %
MYELOCYTES NFR BLD MANUAL: 1 %
N GONORRHOEA DNA SPEC QL NAA+PROBE: NOT DETECTED
NEUTROPHILS NFR BLD MANUAL: 68 %
NRBC BLD AUTO-RTO: 0 %
PLATELET # BLD AUTO: 115 X10(3)/MCL (ref 130–400)
PLATELET # BLD EST: ABNORMAL 10*3/UL
PLATELETS.RETICULATED NFR BLD AUTO: 5.9 % (ref 0.9–11.2)
PMV BLD AUTO: 10.7 FL (ref 7.4–10.4)
POCT GLUCOSE: 126 MG/DL (ref 70–110)
POTASSIUM SERPL-SCNC: 3.3 MMOL/L (ref 3.5–5.1)
PROT SERPL-MCNC: 5.8 GM/DL (ref 6.4–8.3)
RBC # BLD AUTO: 4.4 X10(6)/MCL (ref 4.7–6.1)
RBC MORPH BLD: ABNORMAL
SODIUM SERPL-SCNC: 135 MMOL/L (ref 136–145)
SOURCE (OHS): NORMAL
T PALLIDUM AB SER QL: NONREACTIVE
WBC # SPEC AUTO: 4.3 X10(3)/MCL (ref 4.5–11.5)

## 2024-06-08 PROCEDURE — 25000003 PHARM REV CODE 250: Performed by: STUDENT IN AN ORGANIZED HEALTH CARE EDUCATION/TRAINING PROGRAM

## 2024-06-08 PROCEDURE — 87491 CHLMYD TRACH DNA AMP PROBE: CPT | Performed by: NURSE PRACTITIONER

## 2024-06-08 PROCEDURE — 87340 HEPATITIS B SURFACE AG IA: CPT | Performed by: NURSE PRACTITIONER

## 2024-06-08 PROCEDURE — 87389 HIV-1 AG W/HIV-1&-2 AB AG IA: CPT | Performed by: NURSE PRACTITIONER

## 2024-06-08 PROCEDURE — 63600175 PHARM REV CODE 636 W HCPCS: Performed by: NURSE PRACTITIONER

## 2024-06-08 PROCEDURE — 86705 HEP B CORE ANTIBODY IGM: CPT | Performed by: NURSE PRACTITIONER

## 2024-06-08 PROCEDURE — 63600175 PHARM REV CODE 636 W HCPCS: Performed by: GENERAL PRACTICE

## 2024-06-08 PROCEDURE — 63600175 PHARM REV CODE 636 W HCPCS: Performed by: STUDENT IN AN ORGANIZED HEALTH CARE EDUCATION/TRAINING PROGRAM

## 2024-06-08 PROCEDURE — 25000003 PHARM REV CODE 250: Performed by: NURSE PRACTITIONER

## 2024-06-08 PROCEDURE — 11000001 HC ACUTE MED/SURG PRIVATE ROOM

## 2024-06-08 PROCEDURE — 36415 COLL VENOUS BLD VENIPUNCTURE: CPT | Performed by: STUDENT IN AN ORGANIZED HEALTH CARE EDUCATION/TRAINING PROGRAM

## 2024-06-08 PROCEDURE — 85027 COMPLETE CBC AUTOMATED: CPT | Performed by: PHYSICIAN ASSISTANT

## 2024-06-08 PROCEDURE — 86803 HEPATITIS C AB TEST: CPT | Performed by: NURSE PRACTITIONER

## 2024-06-08 PROCEDURE — 86704 HEP B CORE ANTIBODY TOTAL: CPT | Performed by: NURSE PRACTITIONER

## 2024-06-08 PROCEDURE — 25000003 PHARM REV CODE 250: Performed by: GENERAL PRACTICE

## 2024-06-08 PROCEDURE — 86780 TREPONEMA PALLIDUM: CPT | Performed by: NURSE PRACTITIONER

## 2024-06-08 PROCEDURE — 25000003 PHARM REV CODE 250: Performed by: PHYSICIAN ASSISTANT

## 2024-06-08 PROCEDURE — 80053 COMPREHEN METABOLIC PANEL: CPT | Performed by: PHYSICIAN ASSISTANT

## 2024-06-08 PROCEDURE — 21400001 HC TELEMETRY ROOM

## 2024-06-08 PROCEDURE — 86706 HEP B SURFACE ANTIBODY: CPT | Performed by: NURSE PRACTITIONER

## 2024-06-08 PROCEDURE — 63600175 PHARM REV CODE 636 W HCPCS: Performed by: PHYSICIAN ASSISTANT

## 2024-06-08 PROCEDURE — 36415 COLL VENOUS BLD VENIPUNCTURE: CPT | Performed by: NURSE PRACTITIONER

## 2024-06-08 RX ORDER — POTASSIUM CHLORIDE 20 MEQ/1
40 TABLET, EXTENDED RELEASE ORAL ONCE
Status: COMPLETED | OUTPATIENT
Start: 2024-06-08 | End: 2024-06-08

## 2024-06-08 RX ORDER — IBUPROFEN 400 MG/1
800 TABLET ORAL ONCE
Status: COMPLETED | OUTPATIENT
Start: 2024-06-08 | End: 2024-06-08

## 2024-06-08 RX ORDER — SODIUM CHLORIDE, SODIUM LACTATE, POTASSIUM CHLORIDE, CALCIUM CHLORIDE 600; 310; 30; 20 MG/100ML; MG/100ML; MG/100ML; MG/100ML
INJECTION, SOLUTION INTRAVENOUS CONTINUOUS
Status: DISCONTINUED | OUTPATIENT
Start: 2024-06-08 | End: 2024-06-11 | Stop reason: HOSPADM

## 2024-06-08 RX ORDER — IBUPROFEN 400 MG/1
400 TABLET ORAL ONCE
Status: COMPLETED | OUTPATIENT
Start: 2024-06-08 | End: 2024-06-08

## 2024-06-08 RX ORDER — IBUPROFEN 400 MG/1
400 TABLET ORAL EVERY 6 HOURS PRN
Status: DISCONTINUED | OUTPATIENT
Start: 2024-06-08 | End: 2024-06-11 | Stop reason: HOSPADM

## 2024-06-08 RX ADMIN — CEFEPIME 2 G: 2 INJECTION, POWDER, FOR SOLUTION INTRAVENOUS at 08:06

## 2024-06-08 RX ADMIN — IBUPROFEN 800 MG: 400 TABLET ORAL at 04:06

## 2024-06-08 RX ADMIN — CEFEPIME 2 G: 2 INJECTION, POWDER, FOR SOLUTION INTRAVENOUS at 04:06

## 2024-06-08 RX ADMIN — POTASSIUM CHLORIDE 40 MEQ: 1500 TABLET, EXTENDED RELEASE ORAL at 12:06

## 2024-06-08 RX ADMIN — MORPHINE SULFATE 4 MG: 4 INJECTION INTRAVENOUS at 12:06

## 2024-06-08 RX ADMIN — SODIUM CHLORIDE 1000 ML: 9 INJECTION, SOLUTION INTRAVENOUS at 05:06

## 2024-06-08 RX ADMIN — ACETAMINOPHEN 650 MG: 325 TABLET, FILM COATED ORAL at 12:06

## 2024-06-08 RX ADMIN — ENOXAPARIN SODIUM 40 MG: 40 INJECTION SUBCUTANEOUS at 05:06

## 2024-06-08 RX ADMIN — CEFEPIME 2 G: 2 INJECTION, POWDER, FOR SOLUTION INTRAVENOUS at 02:06

## 2024-06-08 RX ADMIN — SODIUM CHLORIDE, POTASSIUM CHLORIDE, SODIUM LACTATE AND CALCIUM CHLORIDE: 600; 310; 30; 20 INJECTION, SOLUTION INTRAVENOUS at 12:06

## 2024-06-08 RX ADMIN — ACETAMINOPHEN 650 MG: 325 TABLET, FILM COATED ORAL at 02:06

## 2024-06-08 RX ADMIN — IBUPROFEN 400 MG: 400 TABLET, FILM COATED ORAL at 03:06

## 2024-06-08 NOTE — CONSULTS
[facility]  Infectious Diseases Consult        ASSESSMENT & PLAN:     He is a 43-year-old male who presented to the emergency room with fever for 5 days as well as right upper quadrant abdominal pain.  He was seen in an urgent care several days ago and started on cephalexin for possible UTI, however fevers persisted.  He was noted to be febrile and tachycardic on admit.  CT showed multiple liver lesions concerning for necrotic mass versus abscess.  Patient was originally from the [facility] and relocated here in 2017.  He works as a  in [place].  Reports he returned home for 1 week in February.  Denies ingesting any raw foods, diarrhea, or significant vomiting although does report 1 episode of vomiting earlier this week.  He underwent ultrasound-guided liver biopsy today.  Admit blood cultures are positive for Klebsiella pneumoniae per [facility].  Currently on Zosyn.  We have been consulted for assistance.    ABX:  Vancomycin 6/7  Zosyn 6/6-present    Gram-negative bacteremia - Klebsiella per [facility]  Liver lesion - concerning for abscess +/- necrotic mass  Sepsis      PLAN:  F/u final ID and sensi of GNR in BCx - Klebsiella per [facility].  S/p liver biopsy today - f/u cultures, pathology.  Continue Zosyn 4.5g IV q8h for now.  STI screening with am labs.  Discussed with patient.      HISTORY OF PRESENT ILLNESS:     He is a 43-year-old male who presented to the emergency room with fever for 5 days as well as right upper quadrant abdominal pain.  He was seen in an urgent care several days ago and started on cephalexin for possible UTI, however fevers persisted.  He was noted to be febrile and tachycardic on admit.  CT showed multiple liver lesions concerning for necrotic mass versus abscess.  Patient was originally from the [facility] and relocated here in 2017.  He works as a  in [place].  Reports he returned home for 1 week in February.  Denies ingesting any raw foods, diarrhea, or significant  "vomiting although does report 1 episode of vomiting earlier this week.  He underwent ultrasound-guided liver biopsy today.  Admit blood cultures are positive for Klebsiella pneumoniae per [facility].  Currently on Zosyn.  We have been consulted for assistance.    PAST MEDICAL HISTORY:   HLD    PAST SURGICAL HISTORY:   No past surgical history on file.    ALLERGIES:   Patient has no known allergies.    FAMILY HISTORY:   Reviewed and non-contributory     SOCIAL HISTORY:     Social History     Tobacco Use    Smoking status: Not on file    Smokeless tobacco: Not on file   Substance Use Topics    Alcohol use: Not on file        MEDICATIONS:   Reviewed in EMR    REVIEW OF SYSTEMS:   Except as documented, all other systems reviewed and negative     PHYSICAL EXAM:   T 99.3 °F (37.4 °C)   /76   P 99   RR 18   O2 (!) 93 %  GENERAL: NAD; does not appear toxic  SKIN: no rash  HEENT: sclera non-icteric; PERRL   NECK: supple; no LAD  CHEST: CTA; nonlabored, equal expansion; no adventitious BS  CARDIOVASCULAR: RRR, S1S2; no murmur; strong, equal peripheral pulses; no edema  ABDOMEN:  active bowel sounds; abdomen soft, rounded, +RUQ TTP  EXTREMITIES: no cyanosis or clubbing  NEURO: AAO x4; CN II-XII grossly intact  PSYCH: Mentation and affect appropriate     LABS AND IMAGING:     Recent Labs     06/07/24  0406   WBC 3.59*   RBC 4.69*   HGB 14.1   HCT 42.1   MCV 89.8   MCH 30.1   MCHC 33.5   RDW 13.8        No results for input(s): "LACTIC" in the last 72 hours.  Recent Labs     06/07/24  0934   INR 1.1     No results for input(s): "HGBA1C", "CHOL", "TRIG", "LDL", "VLDL", "HDL" in the last 72 hours.   Recent Labs     06/07/24  0406   *   K 3.4*   CO2 16*   BUN 9.6   CREATININE 0.86   GLUCOSE 182*   CALCIUM 8.8   ALBUMIN 2.5*   GLOBULIN 4.4*   ALKPHOS 197*   ALT 98*   AST 73*   BILITOT 3.7*     Recent Labs     06/07/24  0406   TROPONINI 0.013          CT Biopsy Liver (xpd)  Addendum: The above report is 4 drainage.  " Correct report below.     Examination: IR biopsy     Clinical history: Hepatic lesions which may be infectious.  Malignancy not  excluded     Technique: CT-guided biopsy of right hepatic lesion.     DLP: 418     Comparison: CT from earlier.     Findings: The risk and benefits were explained to the patient expressed  understanding.  The soft tissues were anesthetized with a lidocaine  solution.  Under CT guidance a needle was advanced to the right hepatic  lesion.  Two biopsies were obtained.  The pathologist stated predominantly  neutrophils.  The second biopsy was sent to microbiology for culture.     Impression: Successful biopsy.     Electronically signed by: Kvng Yu   Date:    06/07/2024   Time:    14:59  Narrative: EXAMINATION:  CT BIOPSY LIVER (XPD)    CLINICAL HISTORY:  liver lesion/infectious process;    ANESTHESIA: Lidocaine was utilized for local anesthesia.    TECHNIQUE:  CT guided drainage of a right hepatic collection.    DLP: 418    COMPARISON:  No relevant prior available for comparison.    FINDINGS:  The risks, benefits, and potential complications were discussed including bleeding, infection, end organ damage. Informed consent was obtained.    The patient was positioned in the left lateral decubitus position. The right hepatic abscess was localized CT.  The skin was marked and the area was prepped and draped in a sterile fashion.    The site was anesthetized with 1% lidocaine solution. A 19 gauge, 5 cm coaxial needle was advanced into the collection, and a guidewire was then placed through the needle. The track was then dilated and a 12 Slovak drain was placed. The drain was sutured to the skin and attached to a KARINA bulb. There was 120 cc of pus fluid drained.    The patient tolerated the procedure without difficulty. Post-procedure imaging demonstrated a well-positioned catheter.  There were no immediate post-procedure complications. The patient was discharged to his room in stable  condition.  Impression: Successful placement of a 12 Polish drain into the right hepatic abscess.  Samples were collected and sent to the lab for analysis.    Electronically signed by: Kvng Yu  Date:    06/07/2024  Time:    14:54  CT Abdomen Pelvis With IV Contrast NO Oral Contrast  Narrative: EXAMINATION:  CT ABDOMEN PELVIS WITH IV CONTRAST    CLINICAL HISTORY:  RUQ pain;    TECHNIQUE:  Helical acquisition through the abdomen and pelvis with IV contrast.  Three plane reconstructions were provided for review.  mGycm. Automatic exposure control, adjustment of mA/kV or iterative reconstruction technique was used to reduce radiation.    COMPARISON:  No prior    FINDINGS:  There is bibasilar atelectasis or scarring.    There are 3 hypoattenuating liver lesions.  The largest is in the caudate measuring up to about 5 cm.  Patent portal vein.    No significant abnormality of the gallbladder, spleen, pancreas or adrenals.  No hydronephrosis or suspicious renal lesion.    No bowel obstruction.  Normal appendix.  Some rectal wall thickening is favored related to under distension.  No significantly enlarged mesenteric lymph nodes.    Urinary bladder not well distended.  No pelvic free fluid.  Abdominal aorta normal in caliber.    There are no acute osseous findings.  Impression: Hypoattenuating liver lesions could relate to metastatic disease or infection.    Electronically signed by: Ivan Mckeon  Date:    06/07/2024  Time:    11:43  X-Ray Chest AP Portable  Narrative: EXAMINATION:  XR CHEST AP PORTABLE    CLINICAL HISTORY:  Fever, unspecified    COMPARISON:  No priors    FINDINGS:  Frontal view of the chest was obtained. The heart is not enlarged.  Lungs are grossly clear.  There is no pneumothorax or significant effusion.  Impression: No acute findings.    Electronically signed by: Ivan Mckeon  Date:    06/07/2024  Time:    06:00      NEIDA Heck  Infectious Diseases

## 2024-06-08 NOTE — PROGRESS NOTES
[facility] Hospital Medicine Progress Note        Chief Complaint: Inpatient Follow-up for     HPI:   43-year-old male with a history of hyperlipidemia currently on fish oil.  He is coming in with complaints of fevers for 5 days.  He was prescribed cephalexin for possible UTI were continued to have fevers.  Also has right upper quadrant abdominal pain.  Denies eating any raw food, no nausea or vomiting, no diarrhea, no dysuria.  He states that the last time he went to the [facility] was in 2019 which is where he is originally from.  Currently works as a  in [place].  Upon arrival he is having fever of 103 F, tachycardic 140s.  No leukocytosis.  CT imaging shows few irregular defined right and caudate lobe liver lesions concerning for necrotic mass versus abscess.    Patient had CTA of the liver biopsy done on 06/07 which showed largely neutrophils.  Infectious Disease was consulted recommend to stop vancomycin and Zosyn and start cefepime.    Interval Hx:   Patient seen and examined by bedside.  Continues to have some high-grade fevers overnight.  Patient is also symptomatic and says that he has chills.  Appetite has been minimal.  Otherwise vitals stable.  Wife by bedside    Case was discussed with patient's nurse and  on the floor.    Objective/physical exam:  General: In no acute distress, afebrile  Chest: Clear to auscultation bilaterally  Heart: RRR, +S1, S2, no appreciable murmur  Abdomen: Soft, nontender, BS +  MSK: Warm, no lower extremity edema, no clubbing or cyanosis  Neurologic: Alert and oriented x4, Cranial nerve II-XII intact, Strength 5/5 in all 4 extremities    VITAL SIGNS: 24 HRS MIN & MAX LAST   Temp  Min: 98.1 °F (36.7 °C)  Max: 102.9 °F (39.4 °C) 98.5 °F (36.9 °C)   BP  Min: 110/73  Max: 169/103 110/73   Pulse  Min: 87  Max: 123  87   Resp  Min: 18  Max: 20 20   SpO2  Min: 91 %  Max: 100 % 98 %     I have reviewed the following  labs:  Recent Labs   Lab 06/07/24  0406 06/08/24  0440   WBC 3.59* 4.3  4.30*   RBC 4.69* 4.40*   HGB 14.1 13.0*   HCT 42.1 38.5*   MCV 89.8 87.5   MCH 30.1 29.5   MCHC 33.5 33.8   RDW 13.8 14.2    115*   MPV 10.6* 10.7*     Recent Labs   Lab 06/07/24  0406 06/08/24  0440   * 135*   K 3.4* 3.3*    105   CO2 16* 18*   BUN 9.6 12.1   CREATININE 0.86 0.88   CALCIUM 8.8 8.1*   ALBUMIN 2.5* 2.3*   ALKPHOS 197* 179*   ALT 98* 103*   AST 73* 80*   BILITOT 3.7* 3.8*     Microbiology Results (last 7 days)       Procedure Component Value Units Date/Time    Tissue Culture - Aerobic [[id number]]  (Abnormal) Collected: 06/07/24 1324    Order Status: Completed Specimen: Tissue from Liver Updated: 06/08/24 0636     Tissue - Aerobic Culture Moderate Gram-negative Rods    Chlamydia/GC, PCR [[id number]]     Order Status: Sent Specimen: Urine     BCID2 Panel [[id number]]  (Abnormal) Collected: 06/07/24 0406    Order Status: Completed Specimen: Blood Updated: 06/07/24 1926     CTX-M (ESBL ) Not Detected     IMP (Cabapenemase ) Not Detected     KPC resistance gene (Carbapenemase ) Not Detected     mcr-1 Not Detected     mecA ID N/A     Comment: Note: Antimicrobial resistance can occur via multiple mechanisms. A Not Detected result for antimicrobial resistance gene(s) does not indicate antimicrobial susceptibility. Subculturing is required for species identification and susceptibility testing of   isolates.        mecA/C and MREJ (MRSA) gene N/A     NDM (Carbapenemase ) Not Detected     OXA-48-like (Carbapenemase ) Not Detected     Brice/B (VRE gene) N/A     VIM (Carbapenemase ) Not Detected     Enterococcus faecalis Not Detected     Enterococcus faecium Not Detected     Listeria monocytogenes Not Detected     Staphylococcus spp. Not Detected     Staphylococcus aureus Not Detected     Staphylococcus epidermidis Not Detected     Staphylococcus lugdunensis Not Detected      Streptococcus spp. Not Detected     Streptococcus agalactiae (Group B) Not Detected     Streptococcus pneumoniae Not Detected     Streptococcus pyogenes (Group A) Not Detected     Acinetobacter calcoaceticus/baumannii complex Not Detected     Bacteroides fragilis Not Detected     Enterobacterales Detected     Enterobacter cloacae complex Not Detected     Escherichia coli Not Detected     Klebsiella aerogenes Not Detected     Klebsiella oxytoca Not Detected     Klebsiella pneumoniae group Detected     Proteus spp. Not Detected     Salmonella spp. Not Detected     Serratia marcescens Not Detected     Haemophilus influenzae Not Detected     Neisseria meningitidis Not Detected     Pseudomonas aeruginosa Not Detected     Stenotrophomonas maltophilia Not Detected     Candida albicans Not Detected     Candida auris Not Detected     Candida glabrata Not Detected     Candida krusei Not Detected     Candida parapsilosis Not Detected     Candida tropicalis Not Detected     Cryptococcus neoformans/gattii Not Detected    Narrative:      The [employer] BCID2 Panel is a multiplexed nucleic acid test intended for the use with [employer] or [employer] for the simultaneous qualitative detection and identification of multiple bacterial and yeast nucleic acids and select genetic determinants associated with antimicrobial resistance.  The [employer] BCID2 Panel test is performed directly on blood culture samples identified as positive by a continuous monitoring blood culture system.  Results are intended to be interpreted in conjunction with Gram stain results.    Blood culture #2 **CANNOT BE ORDERED STAT** [[id number]]  (Abnormal) Collected: 06/07/24 0406    Order Status: Completed Specimen: Blood Updated: 06/07/24 1808     GRAM STAIN Gram Negative Rods      Seen in gram stain of broth only      2 of 2 bottles positive    Blood culture #1 **CANNOT BE ORDERED STAT** [[id number]]  (Abnormal) Collected:  06/07/24 0406    Order Status: Completed Specimen: Blood Updated: 06/07/24 1808     GRAM STAIN Gram Negative Rods      Seen in gram stain of broth only      2 of 2 bottles positive    Gram Stain [[id number]] Collected: 06/07/24 1324    Order Status: Completed Specimen: Tissue from Liver Updated: 06/07/24 1504     GRAM STAIN No WBCs, No bacteria seen    Anaerobic Culture [[id number]] Collected: 06/07/24 1324    Order Status: Sent Specimen: Tissue from Liver Updated: 06/07/24 1324    Fungal Culture [[id number]] Collected: 06/07/24 1324    Order Status: Sent Specimen: Tissue from Liver Updated: 06/07/24 1324             See below for Radiology    Scheduled Med:   ceFEPime IV (PEDS and ADULTS)  2 g Intravenous Q8H    enoxparin  40 mg Subcutaneous Daily      Continuous Infusions:     PRN Meds:    Current Facility-Administered Medications:     acetaminophen, 650 mg, Oral, Q8H PRN    dextrose 10%, 12.5 g, Intravenous, PRN    dextrose 10%, 25 g, Intravenous, PRN    glucagon (human recombinant), 1 mg, Intramuscular, PRN    glucose, 16 g, Oral, PRN    glucose, 24 g, Oral, PRN    sodium chloride 0.9%, 10 mL, Intravenous, Q12H PRN     Patient meets ASPEN criteria for   malnutrition of   per RD assessment as evidenced by:                       A minimum of two characteristics is recommended for diagnosis of either severe or non-severe malnutrition.    Assessment/Plan:  Sepsis, secondary to bacteremia  Klebsiella Bacteremia, secondary to liver abscess   Pyogenic liver abscess   Hyperbilirubinemia, largely direct; likely secondary to abscess.  CT abdomen and pelvis showed normal appearing gallbladder, spleen, pancreas, and adrenals.  Pancytopenia, unclear etiology   Transaminitis, likely secondary to infection and abscess  History of GERD    Patient is status post liver biopsy done on 06/08 for concern of mass  Per report pathology stated predominantly neutrophils , malignancy unlikely;   2/2 blood cultures positive for  Gram-negative rods, likely secondary to the liver abscess   Tissue growing moderate Gram-negative calm, likely Klebsiella  Infectious Disease has been consulted, recommend to stop vanc and Zosyn and start cefepime  Awaiting sensitivities   Patient continues to remain febrile, if fevers remain persistent may have to escalate to meropenem for broader coverage   Patient likely will need a long-term drain placement for abscess  Plan for Radiology to place drain placement on Monday  Continue IV fluids   Hypokalemia of 3.3, replaced and recheck in am  Thorax be thrombocytopenia of 115 today, we will continue Lovenox as platelets are still greater than 100  Obtain peripheral smear for pancytopenia,  Further recs based on clinical course   Labs in a.m.      Critical care note:  Critical care diagnosis:  Sepsis secondary to liver abscess  Critical care interventions: Hands-on evaluation, review of labs/radiographs/records and discussion with patient and family if present  Critical care time spent: 35 minutes     VTE prophylaxis:  Lovenox    Patient condition: fair    Anticipated discharge and Disposition:   Likely home      All diagnosis and differential diagnosis have been reviewed; assessment and plan has been documented; I have personally reviewed the labs and test results that are presently available; I have reviewed the patients medication list; I have reviewed the consulting providers response and recommendations. I have reviewed or attempted to review medical records based upon their availability    All of the patient's questions have been  addressed and answered. Patient's is agreeable to the above stated plan. I will continue to monitor closely and make adjustments to medical management as needed.  _____________________________________________________________________    Nutrition Status:    Radiology:  I have personally reviewed the following imaging and agree with the radiologist.     CT Biopsy Liver  (xpd)  Addendum: The above report is 4 drainage.  Correct report below.     Examination: IR biopsy     Clinical history: Hepatic lesions which may be infectious.  Malignancy not  excluded     Technique: CT-guided biopsy of right hepatic lesion.     DLP: 418     Comparison: CT from earlier.     Findings: The risk and benefits were explained to the patient expressed  understanding.  The soft tissues were anesthetized with a lidocaine  solution.  Under CT guidance a needle was advanced to the right hepatic  lesion.  Two biopsies were obtained.  The pathologist stated predominantly  neutrophils.  The second biopsy was sent to microbiology for culture.     Impression: Successful biopsy.     Electronically signed by: Kvng Yu   Date:    06/07/2024   Time:    14:59  Narrative: EXAMINATION:  CT BIOPSY LIVER (XPD)    CLINICAL HISTORY:  liver lesion/infectious process;    ANESTHESIA: Lidocaine was utilized for local anesthesia.    TECHNIQUE:  CT guided drainage of a right hepatic collection.    DLP: 418    COMPARISON:  No relevant prior available for comparison.    FINDINGS:  The risks, benefits, and potential complications were discussed including bleeding, infection, end organ damage. Informed consent was obtained.    The patient was positioned in the left lateral decubitus position. The right hepatic abscess was localized CT.  The skin was marked and the area was prepped and draped in a sterile fashion.    The site was anesthetized with 1% lidocaine solution. A 19 gauge, 5 cm coaxial needle was advanced into the collection, and a guidewire was then placed through the needle. The track was then dilated and a 12 [place] drain was placed. The drain was sutured to the skin and attached to a KARINA bulb. There was 120 cc of pus fluid drained.    The patient tolerated the procedure without difficulty. Post-procedure imaging demonstrated a well-positioned catheter.  There were no immediate post-procedure complications. The  patient was discharged to his room in stable condition.  Impression: Successful placement of a 12 [place] of [place] drain into the right hepatic abscess.  Samples were collected and sent to the lab for analysis.    Electronically signed by: Kvng Yu  Date:    06/07/2024  Time:    14:54  CT Abdomen Pelvis With IV Contrast NO Oral Contrast  Narrative: EXAMINATION:  CT ABDOMEN PELVIS WITH IV CONTRAST    CLINICAL HISTORY:  RUQ pain;    TECHNIQUE:  Helical acquisition through the abdomen and pelvis with IV contrast.  Three plane reconstructions were provided for review.  mGycm. Automatic exposure control, adjustment of mA/kV or iterative reconstruction technique was used to reduce radiation.    COMPARISON:  No prior    FINDINGS:  There is bibasilar atelectasis or scarring.    There are 3 hypoattenuating liver lesions.  The largest is in the caudate measuring up to about 5 cm.  Patent portal vein.    No significant abnormality of the gallbladder, spleen, pancreas or adrenals.  No hydronephrosis or suspicious renal lesion.    No bowel obstruction.  Normal appendix.  Some rectal wall thickening is favored related to under distension.  No significantly enlarged mesenteric lymph nodes.    Urinary bladder not well distended.  No pelvic free fluid.  Abdominal aorta normal in caliber.    There are no acute osseous findings.  Impression: Hypoattenuating liver lesions could relate to metastatic disease or infection.    Electronically signed by: Ivan Mckeon  Date:    06/07/2024  Time:    11:43  X-Ray Chest AP Portable  Narrative: EXAMINATION:  XR CHEST AP PORTABLE    CLINICAL HISTORY:  Fever, unspecified    COMPARISON:  No priors    FINDINGS:  Frontal view of the chest was obtained. The heart is not enlarged.  Lungs are grossly clear.  There is no pneumothorax or significant effusion.  Impression: No acute findings.    Electronically signed by: Ivan Mckeon  Date:    06/07/2024  Time:    06:00      Antonietta Gaming DO  Department of  [facility]  06/08/2024

## 2024-06-09 LAB
ALBUMIN SERPL-MCNC: 2 G/DL (ref 3.5–5)
ALBUMIN/GLOB SERPL: 0.6 RATIO (ref 1.1–2)
ALP SERPL-CCNC: 166 UNIT/L (ref 40–150)
ALT SERPL-CCNC: 79 UNIT/L (ref 0–55)
ANION GAP SERPL CALC-SCNC: 9 MEQ/L
AST SERPL-CCNC: 45 UNIT/L (ref 5–34)
BACTERIA BLD CULT: ABNORMAL
BACTERIA BLD CULT: ABNORMAL
BACTERIA TISS AEROBE CULT: ABNORMAL
BILIRUB SERPL-MCNC: 3 MG/DL
BUN SERPL-MCNC: 10.5 MG/DL (ref 8.9–20.6)
CALCIUM SERPL-MCNC: 8 MG/DL (ref 8.4–10.2)
CHLORIDE SERPL-SCNC: 108 MMOL/L (ref 98–107)
CO2 SERPL-SCNC: 20 MMOL/L (ref 22–29)
CREAT SERPL-MCNC: 0.69 MG/DL (ref 0.73–1.18)
CREAT/UREA NIT SERPL: 15
ERYTHROCYTE [DISTWIDTH] IN BLOOD BY AUTOMATED COUNT: 14.8 % (ref 11.5–17)
GFR SERPLBLD CREATININE-BSD FMLA CKD-EPI: >60 ML/MIN/1.73/M2
GLOBULIN SER-MCNC: 3.4 GM/DL (ref 2.4–3.5)
GLUCOSE SERPL-MCNC: 175 MG/DL (ref 74–100)
GRAM STN SPEC: ABNORMAL
HBV CORE AB SERPL QL IA: NONREACTIVE
HBV CORE IGM SERPL QL IA: NONREACTIVE
HBV SURFACE AB SER-ACNC: 0.49 MIU/ML
HBV SURFACE AB SERPL IA-ACNC: NONREACTIVE M[IU]/ML
HBV SURFACE AG SERPL QL IA: NONREACTIVE
HCT VFR BLD AUTO: 37.5 % (ref 42–52)
HCV AB SERPL QL IA: NONREACTIVE
HEMATOLOGIST REVIEW: NORMAL
HGB BLD-MCNC: 12.5 G/DL (ref 14–18)
HIV 1+2 AB+HIV1 P24 AG SERPL QL IA: NONREACTIVE
MCH RBC QN AUTO: 29.8 PG (ref 27–31)
MCHC RBC AUTO-ENTMCNC: 33.3 G/DL (ref 33–36)
MCV RBC AUTO: 89.3 FL (ref 80–94)
NRBC BLD AUTO-RTO: 0 %
PLATELET # BLD AUTO: 130 X10(3)/MCL (ref 130–400)
PLATELETS.RETICULATED NFR BLD AUTO: 10.2 % (ref 0.9–11.2)
PMV BLD AUTO: 11.9 FL (ref 7.4–10.4)
POTASSIUM SERPL-SCNC: 3.7 MMOL/L (ref 3.5–5.1)
PROT SERPL-MCNC: 5.4 GM/DL (ref 6.4–8.3)
RBC # BLD AUTO: 4.2 X10(6)/MCL (ref 4.7–6.1)
SODIUM SERPL-SCNC: 137 MMOL/L (ref 136–145)
WBC # SPEC AUTO: 11.31 X10(3)/MCL (ref 4.5–11.5)

## 2024-06-09 PROCEDURE — 25000003 PHARM REV CODE 250: Performed by: STUDENT IN AN ORGANIZED HEALTH CARE EDUCATION/TRAINING PROGRAM

## 2024-06-09 PROCEDURE — 25000003 PHARM REV CODE 250: Performed by: GENERAL PRACTICE

## 2024-06-09 PROCEDURE — 21400001 HC TELEMETRY ROOM

## 2024-06-09 PROCEDURE — 85027 COMPLETE CBC AUTOMATED: CPT | Performed by: STUDENT IN AN ORGANIZED HEALTH CARE EDUCATION/TRAINING PROGRAM

## 2024-06-09 PROCEDURE — 36415 COLL VENOUS BLD VENIPUNCTURE: CPT | Performed by: STUDENT IN AN ORGANIZED HEALTH CARE EDUCATION/TRAINING PROGRAM

## 2024-06-09 PROCEDURE — 63600175 PHARM REV CODE 636 W HCPCS: Performed by: PHYSICIAN ASSISTANT

## 2024-06-09 PROCEDURE — 63600175 PHARM REV CODE 636 W HCPCS: Performed by: STUDENT IN AN ORGANIZED HEALTH CARE EDUCATION/TRAINING PROGRAM

## 2024-06-09 PROCEDURE — 63600175 PHARM REV CODE 636 W HCPCS: Performed by: GENERAL PRACTICE

## 2024-06-09 PROCEDURE — 80053 COMPREHEN METABOLIC PANEL: CPT | Performed by: STUDENT IN AN ORGANIZED HEALTH CARE EDUCATION/TRAINING PROGRAM

## 2024-06-09 RX ADMIN — CEFEPIME 2 G: 2 INJECTION, POWDER, FOR SOLUTION INTRAVENOUS at 04:06

## 2024-06-09 RX ADMIN — CEFEPIME 2 G: 2 INJECTION, POWDER, FOR SOLUTION INTRAVENOUS at 01:06

## 2024-06-09 RX ADMIN — CEFEPIME 2 G: 2 INJECTION, POWDER, FOR SOLUTION INTRAVENOUS at 08:06

## 2024-06-09 RX ADMIN — IBUPROFEN 400 MG: 400 TABLET ORAL at 08:06

## 2024-06-09 RX ADMIN — IBUPROFEN 400 MG: 400 TABLET ORAL at 01:06

## 2024-06-09 RX ADMIN — IBUPROFEN 400 MG: 400 TABLET ORAL at 04:06

## 2024-06-09 RX ADMIN — ENOXAPARIN SODIUM 40 MG: 40 INJECTION SUBCUTANEOUS at 04:06

## 2024-06-09 RX ADMIN — SODIUM CHLORIDE, POTASSIUM CHLORIDE, SODIUM LACTATE AND CALCIUM CHLORIDE: 600; 310; 30; 20 INJECTION, SOLUTION INTRAVENOUS at 01:06

## 2024-06-09 NOTE — PROGRESS NOTES
[facility] Hospital Medicine Progress Note        Chief Complaint: Inpatient Follow-up for     HPI:   43-year-old male with a history of hyperlipidemia currently on fish oil.  He is coming in with complaints of fevers for 5 days.  He was prescribed cephalexin for possible UTI were continued to have fevers.  Also has right upper quadrant abdominal pain.  Denies eating any raw food, no nausea or vomiting, no diarrhea, no dysuria.  He states that the last time he went to the [facility] was in 2019 which is where he is originally from.  Currently works as a  in [place].  Upon arrival he is having fever of 103 F, tachycardic 140s.  No leukocytosis.  CT imaging shows few irregular defined right and caudate lobe liver lesions concerning for necrotic mass versus abscess.    Patient had CTA of the liver biopsy done on 06/07 which showed largely neutrophils.  Infectious Disease was consulted recommend to stop vancomycin and Zosyn and start cefepime.    Interval Hx:   Patient seen and examined by bedside.  No acute overnight events.  No fevers noted in last 24 hours.  Patient says that he does still have some shivering and he will take ibuprofen for it.  Discussed with patient to only take ibuprofen with either pain or when actually having a fever or his fever will be masked.  Wife by bedside.      Case was discussed with patient's nurse and  on the floor.    Objective/physical exam:  General: In no acute distress, afebrile; scleral icterus  Chest: Clear to auscultation bilaterally  Heart: RRR, +S1, S2, no appreciable murmur  Abdomen: Soft, nontender, BS +  MSK: Warm, no lower extremity edema, no clubbing or cyanosis  Neurologic: Alert and oriented x4, Cranial nerve II-XII intact, Strength 5/5 in all 4 extremities    VITAL SIGNS: 24 HRS MIN & MAX LAST   Temp  Min: 97.8 °F (36.6 °C)  Max: 99.5 °F (37.5 °C) 98.2 °F (36.8 °C)   BP  Min: 93/35  Max: 132/81 115/67    Pulse  Min: 70  Max: 123  81   Resp  Min: 20  Max: 20 20   SpO2  Min: 97 %  Max: 99 % 98 %     I have reviewed the following labs:  Recent Labs   Lab 06/07/24 0406 06/08/24 0440 06/09/24  0831   WBC 3.59* 4.3  4.30* 11.31   RBC 4.69* 4.40* 4.20*   HGB 14.1 13.0* 12.5*   HCT 42.1 38.5* 37.5*   MCV 89.8 87.5 89.3   MCH 30.1 29.5 29.8   MCHC 33.5 33.8 33.3   RDW 13.8 14.2 14.8    115* 130   MPV 10.6* 10.7* 11.9*     Recent Labs   Lab 06/07/24 0406 06/08/24 0440 06/09/24 0831   * 135* 137   K 3.4* 3.3* 3.7    105 108*   CO2 16* 18* 20*   BUN 9.6 12.1 10.5   CREATININE 0.86 0.88 0.69*   CALCIUM 8.8 8.1* 8.0*   ALBUMIN 2.5* 2.3* 2.0*   ALKPHOS 197* 179* 166*   ALT 98* 103* 79*   AST 73* 80* 45*   BILITOT 3.7* 3.8* 3.0*     Microbiology Results (last 7 days)       Procedure Component Value Units Date/Time    Tissue Culture - Aerobic [[id number]]  (Abnormal)  (Susceptibility) Collected: 06/07/24 1324    Order Status: Completed Specimen: Tissue from Liver Updated: 06/09/24 0659     Tissue - Aerobic Culture Moderate Klebsiella pneumoniae    Anaerobic Culture [[id number]] Collected: 06/07/24 1324    Order Status: Completed Specimen: Tissue from Liver Updated: 06/09/24 0657     Anaerobe Culture No Anaerobes Isolated    Blood culture #2 **CANNOT BE ORDERED STAT** [[id number]]  (Abnormal)  (Susceptibility) Collected: 06/07/24 0406    Order Status: Completed Specimen: Blood Updated: 06/09/24 0617     Blood Culture Klebsiella pneumoniae     GRAM STAIN Gram Negative Rods      Seen in gram stain of broth only      2 of 2 bottles positive    Blood culture #1 **CANNOT BE ORDERED STAT** [[id number]]  (Abnormal)  (Susceptibility) Collected: 06/07/24 0406    Order Status: Completed Specimen: Blood Updated: 06/09/24 0617     Blood Culture Klebsiella pneumoniae     GRAM STAIN Gram Negative Rods      Seen in gram stain of broth only      2 of 2 bottles positive    Chlamydia/GC, PCR [[id number]] Collected: 06/08/24  2100    Order Status: Completed Specimen: Urine Updated: 06/08/24 2235     Chlamydia trachomatis PCR Not Detected     N. gonorrhea PCR Not Detected     Source Urine    Narrative:      The Xpert CT/NG test, performed on the GeneXpert system is a qualitative in vitro real-time polymerase chain reaction (PCR) test for the automated detected and differentiation for genomic DNA from Chlamydia trachomatis (CT) and/or Neisseria gonorrhoeae (NG).    BCID2 Panel [[id number]]  (Abnormal) Collected: 06/07/24 0406    Order Status: Completed Specimen: Blood Updated: 06/07/24 1926     CTX-M (ESBL ) Not Detected     IMP (Cabapenemase ) Not Detected     KPC resistance gene (Carbapenemase ) Not Detected     mcr-1 Not Detected     mecA ID N/A     Comment: Note: Antimicrobial resistance can occur via multiple mechanisms. A Not Detected result for antimicrobial resistance gene(s) does not indicate antimicrobial susceptibility. Subculturing is required for species identification and susceptibility testing of   isolates.        mecA/C and MREJ (MRSA) gene N/A     NDM (Carbapenemase ) Not Detected     OXA-48-like (Carbapenemase ) Not Detected     Brice/B (VRE gene) N/A     VIM (Carbapenemase ) Not Detected     Enterococcus faecalis Not Detected     Enterococcus faecium Not Detected     Listeria monocytogenes Not Detected     Staphylococcus spp. Not Detected     Staphylococcus aureus Not Detected     Staphylococcus epidermidis Not Detected     Staphylococcus lugdunensis Not Detected     Streptococcus spp. Not Detected     Streptococcus agalactiae (Group B) Not Detected     Streptococcus pneumoniae Not Detected     Streptococcus pyogenes (Group A) Not Detected     Acinetobacter calcoaceticus/baumannii complex Not Detected     Bacteroides fragilis Not Detected     Enterobacterales Detected     Enterobacter cloacae complex Not Detected     Escherichia coli Not Detected     Klebsiella aerogenes  Not Detected     Klebsiella oxytoca Not Detected     Klebsiella pneumoniae group Detected     Proteus spp. Not Detected     Salmonella spp. Not Detected     Serratia marcescens Not Detected     Haemophilus influenzae Not Detected     Neisseria meningitidis Not Detected     Pseudomonas aeruginosa Not Detected     Stenotrophomonas maltophilia Not Detected     Candida albicans Not Detected     Candida auris Not Detected     Candida glabrata Not Detected     Candida krusei Not Detected     Candida parapsilosis Not Detected     Candida tropicalis Not Detected     Cryptococcus neoformans/gattii Not Detected    Narrative:      The VANDOLAY BCID2 Panel is a multiplexed nucleic acid test intended for the use with Fly6® Mobento.0 or Fly6® kalidea Systems for the simultaneous qualitative detection and identification of multiple bacterial and yeast nucleic acids and select genetic determinants associated with antimicrobial resistance.  The VANDOLAY BCID2 Panel test is performed directly on blood culture samples identified as positive by a continuous monitoring blood culture system.  Results are intended to be interpreted in conjunction with Gram stain results.    Gram Stain [[id number]] Collected: 06/07/24 1324    Order Status: Completed Specimen: Tissue from Liver Updated: 06/07/24 1504     GRAM STAIN No WBCs, No bacteria seen    Fungal Culture [[id number]] Collected: 06/07/24 1324    Order Status: Sent Specimen: Tissue from Liver Updated: 06/07/24 1324             See below for Radiology    Scheduled Med:   ceFEPime IV (PEDS and ADULTS)  2 g Intravenous Q8H    enoxparin  40 mg Subcutaneous Daily      Continuous Infusions:   lactated ringers   Intravenous Continuous 100 mL/hr at 06/08/24 1511 Rate Change at 06/08/24 1511      PRN Meds:    Current Facility-Administered Medications:     acetaminophen, 650 mg, Oral, Q8H PRN    dextrose 10%, 12.5 g, Intravenous, PRN    dextrose 10%, 25 g, Intravenous, PRN     glucagon (human recombinant), 1 mg, Intramuscular, PRN    glucose, 16 g, Oral, PRN    glucose, 24 g, Oral, PRN    ibuprofen, 400 mg, Oral, Q6H PRN    sodium chloride 0.9%, 10 mL, Intravenous, Q12H PRN     Patient meets ASPEN criteria for   malnutrition of   per RD assessment as evidenced by:                       A minimum of two characteristics is recommended for diagnosis of either severe or non-severe malnutrition.    Assessment/Plan:  Sepsis, secondary to bacteremia  Klebsiella Bacteremia, secondary to liver abscess   Pyogenic liver abscess   Hyperbilirubinemia, largely direct; likely secondary to abscess.  CT abdomen and pelvis showed normal appearing gallbladder, spleen, pancreas, and adrenals.  Pancytopenia, unclear etiology   Transaminitis, likely secondary to infection and abscess  History of GERD    Patient is status post liver biopsy done on 06/08 for concern of mass  Per report pathology stated predominantly neutrophils , malignancy unlikely;   2/2 blood cultures positive for Gram-negative rods, likely secondary to the liver abscess   Aerobic culture resulted as moderate Klebsiella pneumoniae, largely pansensitive  Infectious Disease has been consulted, recommend to stop vanc and Zosyn and start cefepime  Continue cefepime  Patient may need need a long-term drain placement for abscess.  We will discuss with ID tomorrow for further recs  Continue IV fluids   Hypokalemia resolved  Further recs based on clinical course   Labs in a.m.      Critical care note:  Critical care diagnosis:  Sepsis secondary to liver abscess  Critical care interventions: Hands-on evaluation, review of labs/radiographs/records and discussion with patient and family if present  Critical care time spent: 35 minutes     VTE prophylaxis:  Lovenox    Patient condition: fair    Anticipated discharge and Disposition:   Likely home      All diagnosis and differential diagnosis have been reviewed; assessment and plan has been documented; I  have personally reviewed the labs and test results that are presently available; I have reviewed the patients medication list; I have reviewed the consulting providers response and recommendations. I have reviewed or attempted to review medical records based upon their availability    All of the patient's questions have been  addressed and answered. Patient's is agreeable to the above stated plan. I will continue to monitor closely and make adjustments to medical management as needed.  _____________________________________________________________________    Nutrition Status:    Radiology:  I have personally reviewed the following imaging and agree with the radiologist.     CT Biopsy Liver (xpd)  Addendum: The above report is 4 drainage.  Correct report below.     Examination: IR biopsy     Clinical history: Hepatic lesions which may be infectious.  Malignancy not  excluded     Technique: CT-guided biopsy of right hepatic lesion.     DLP: 418     Comparison: CT from earlier.     Findings: The risk and benefits were explained to the patient expressed  understanding.  The soft tissues were anesthetized with a lidocaine  solution.  Under CT guidance a needle was advanced to the right hepatic  lesion.  Two biopsies were obtained.  The pathologist stated predominantly  neutrophils.  The second biopsy was sent to microbiology for culture.     Impression: Successful biopsy.     Electronically signed by: Kvng Yu   Date:    06/07/2024   Time:    14:59  Narrative: EXAMINATION:  CT BIOPSY LIVER (XPD)    CLINICAL HISTORY:  liver lesion/infectious process;    ANESTHESIA: Lidocaine was utilized for local anesthesia.    TECHNIQUE:  CT guided drainage of a right hepatic collection.    DLP: 418    COMPARISON:  No relevant prior available for comparison.    FINDINGS:  The risks, benefits, and potential complications were discussed including bleeding, infection, end organ damage. Informed consent was obtained.    The patient was  positioned in the left lateral decubitus position. The right hepatic abscess was localized CT.  The skin was marked and the area was prepped and draped in a sterile fashion.    The site was anesthetized with 1% lidocaine solution. A 19 gauge, 5 cm coaxial needle was advanced into the collection, and a guidewire was then placed through the needle. The track was then dilated and a 12 Lao drain was placed. The drain was sutured to the skin and attached to a KARINA bulb. There was 120 cc of pus fluid drained.    The patient tolerated the procedure without difficulty. Post-procedure imaging demonstrated a well-positioned catheter.  There were no immediate post-procedure complications. The patient was discharged to his room in stable condition.  Impression: Successful placement of a 12 Lao drain into the right hepatic abscess.  Samples were collected and sent to the lab for analysis.    Electronically signed by: Knvg Yu  Date:    06/07/2024  Time:    14:54  CT Abdomen Pelvis With IV Contrast NO Oral Contrast  Narrative: EXAMINATION:  CT ABDOMEN PELVIS WITH IV CONTRAST    CLINICAL HISTORY:  RUQ pain;    TECHNIQUE:  Helical acquisition through the abdomen and pelvis with IV contrast.  Three plane reconstructions were provided for review.  mGycm. Automatic exposure control, adjustment of mA/kV or iterative reconstruction technique was used to reduce radiation.    COMPARISON:  No prior    FINDINGS:  There is bibasilar atelectasis or scarring.    There are 3 hypoattenuating liver lesions.  The largest is in the caudate measuring up to about 5 cm.  Patent portal vein.    No significant abnormality of the gallbladder, spleen, pancreas or adrenals.  No hydronephrosis or suspicious renal lesion.    No bowel obstruction.  Normal appendix.  Some rectal wall thickening is favored related to under distension.  No significantly enlarged mesenteric lymph nodes.    Urinary bladder not well distended.  No pelvic free  fluid.  Abdominal aorta normal in caliber.    There are no acute osseous findings.  Impression: Hypoattenuating liver lesions could relate to metastatic disease or infection.    Electronically signed by: Ivan Mckeon  Date:    06/07/2024  Time:    11:43  X-Ray Chest AP Portable  Narrative: EXAMINATION:  XR CHEST AP PORTABLE    CLINICAL HISTORY:  Fever, unspecified    COMPARISON:  No priors    FINDINGS:  Frontal view of the chest was obtained. The heart is not enlarged.  Lungs are grossly clear.  There is no pneumothorax or significant effusion.  Impression: No acute findings.    Electronically signed by: Ivan Mckeon  Date:    06/07/2024  Time:    06:00      Antonietta Gaming DO  Department of Hospital Medicine  [place]  06/09/2024

## 2024-06-10 LAB
ALBUMIN SERPL-MCNC: 1.9 G/DL (ref 3.5–5)
ALBUMIN/GLOB SERPL: 0.5 RATIO (ref 1.1–2)
ALP SERPL-CCNC: 184 UNIT/L (ref 40–150)
ALT SERPL-CCNC: 71 UNIT/L (ref 0–55)
ANION GAP SERPL CALC-SCNC: 9 MEQ/L
AST SERPL-CCNC: 45 UNIT/L (ref 5–34)
BACTERIA SPEC ANAEROBE CULT: NORMAL
BILIRUB SERPL-MCNC: 1.8 MG/DL
BUN SERPL-MCNC: 8.2 MG/DL (ref 8.9–20.6)
CALCIUM SERPL-MCNC: 7.8 MG/DL (ref 8.4–10.2)
CHLORIDE SERPL-SCNC: 105 MMOL/L (ref 98–107)
CO2 SERPL-SCNC: 21 MMOL/L (ref 22–29)
CREAT SERPL-MCNC: 0.66 MG/DL (ref 0.73–1.18)
CREAT/UREA NIT SERPL: 12
GFR SERPLBLD CREATININE-BSD FMLA CKD-EPI: >60 ML/MIN/1.73/M2
GLOBULIN SER-MCNC: 3.6 GM/DL (ref 2.4–3.5)
GLUCOSE SERPL-MCNC: 129 MG/DL (ref 74–100)
POTASSIUM SERPL-SCNC: 3.9 MMOL/L (ref 3.5–5.1)
PROT SERPL-MCNC: 5.5 GM/DL (ref 6.4–8.3)
PSYCHE PATHOLOGY RESULT: NORMAL
SODIUM SERPL-SCNC: 135 MMOL/L (ref 136–145)

## 2024-06-10 PROCEDURE — 63600175 PHARM REV CODE 636 W HCPCS: Performed by: PHYSICIAN ASSISTANT

## 2024-06-10 PROCEDURE — 63600175 PHARM REV CODE 636 W HCPCS: Performed by: STUDENT IN AN ORGANIZED HEALTH CARE EDUCATION/TRAINING PROGRAM

## 2024-06-10 PROCEDURE — 25000003 PHARM REV CODE 250: Performed by: STUDENT IN AN ORGANIZED HEALTH CARE EDUCATION/TRAINING PROGRAM

## 2024-06-10 PROCEDURE — 36415 COLL VENOUS BLD VENIPUNCTURE: CPT | Performed by: STUDENT IN AN ORGANIZED HEALTH CARE EDUCATION/TRAINING PROGRAM

## 2024-06-10 PROCEDURE — 87040 BLOOD CULTURE FOR BACTERIA: CPT | Performed by: STUDENT IN AN ORGANIZED HEALTH CARE EDUCATION/TRAINING PROGRAM

## 2024-06-10 PROCEDURE — 63600175 PHARM REV CODE 636 W HCPCS: Performed by: GENERAL PRACTICE

## 2024-06-10 PROCEDURE — 25500020 PHARM REV CODE 255: Performed by: STUDENT IN AN ORGANIZED HEALTH CARE EDUCATION/TRAINING PROGRAM

## 2024-06-10 PROCEDURE — 99233 SBSQ HOSP IP/OBS HIGH 50: CPT | Mod: ,,, | Performed by: GENERAL PRACTICE

## 2024-06-10 PROCEDURE — 21400001 HC TELEMETRY ROOM

## 2024-06-10 PROCEDURE — 80053 COMPREHEN METABOLIC PANEL: CPT | Performed by: STUDENT IN AN ORGANIZED HEALTH CARE EDUCATION/TRAINING PROGRAM

## 2024-06-10 PROCEDURE — 25000003 PHARM REV CODE 250: Performed by: GENERAL PRACTICE

## 2024-06-10 RX ORDER — METRONIDAZOLE 500 MG/1
500 TABLET ORAL EVERY 8 HOURS
Status: DISCONTINUED | OUTPATIENT
Start: 2024-06-10 | End: 2024-06-11 | Stop reason: HOSPADM

## 2024-06-10 RX ORDER — HYDRALAZINE HYDROCHLORIDE 20 MG/ML
10 INJECTION INTRAMUSCULAR; INTRAVENOUS EVERY 6 HOURS PRN
Status: DISCONTINUED | OUTPATIENT
Start: 2024-06-10 | End: 2024-06-11 | Stop reason: HOSPADM

## 2024-06-10 RX ADMIN — IBUPROFEN 400 MG: 400 TABLET ORAL at 11:06

## 2024-06-10 RX ADMIN — IOHEXOL 94 ML: 350 INJECTION, SOLUTION INTRAVENOUS at 03:06

## 2024-06-10 RX ADMIN — CEFEPIME 2 G: 2 INJECTION, POWDER, FOR SOLUTION INTRAVENOUS at 01:06

## 2024-06-10 RX ADMIN — CEFEPIME 2 G: 2 INJECTION, POWDER, FOR SOLUTION INTRAVENOUS at 04:06

## 2024-06-10 RX ADMIN — CEFEPIME 2 G: 2 INJECTION, POWDER, FOR SOLUTION INTRAVENOUS at 08:06

## 2024-06-10 RX ADMIN — METRONIDAZOLE 500 MG: 500 TABLET ORAL at 09:06

## 2024-06-10 RX ADMIN — IBUPROFEN 400 MG: 400 TABLET ORAL at 07:06

## 2024-06-10 RX ADMIN — IBUPROFEN 400 MG: 400 TABLET ORAL at 03:06

## 2024-06-10 RX ADMIN — ENOXAPARIN SODIUM 40 MG: 40 INJECTION SUBCUTANEOUS at 04:06

## 2024-06-10 RX ADMIN — VANCOMYCIN HYDROCHLORIDE 2000 MG: 500 INJECTION, POWDER, LYOPHILIZED, FOR SOLUTION INTRAVENOUS at 07:06

## 2024-06-10 RX ADMIN — HYDRALAZINE HYDROCHLORIDE 10 MG: 20 INJECTION INTRAMUSCULAR; INTRAVENOUS at 07:06

## 2024-06-10 NOTE — PROGRESS NOTES
"Pharmacokinetic Initial Assessment: IV Vancomycin    Assessment/Plan:    Initiate intravenous vancomycin with loading dose of 2,000 mg once followed by a maintenance dose of vancomycin 1,500mg IV every 12 hours  Desired empiric serum trough concentration is 15 to 20 mcg/mL  Draw vancomycin trough level 60 min prior to fourth dose on 06/12 at approximately 0500  Pharmacy will continue to follow and monitor vancomycin.      Please contact pharmacy at extension [phone] with any questions regarding this assessment.     Thank you for the consult,   Demetrajustus Cruzo       Patient brief summary:  Nba Montoya is a 43 y.o. male initiated on antimicrobial therapy with IV Vancomycin for treatment of suspected intra-abdominal infection    Drug Allergies:   Review of patient's allergies indicates:  No Known Allergies    Actual Body Weight:   Wt Readings from Last 1 Encounters:   06/07/24 83.9 kg (185 lb)       Renal Function:   Estimated Creatinine Clearance: 135.3 mL/min (A) (based on SCr of 0.66 mg/dL (L)).,     Dialysis Method (if applicable):  N/A    CBC (last 72 hours):  Recent Labs   Lab Result Units 06/08/24  0440 06/09/24  0831   WBC x10(3)/mcL 4.3  4.30* 11.31   Hgb g/dL 13.0* 12.5*   Hct % 38.5* 37.5*   Platelet x10(3)/mcL 115* 130   Monocytes % % 1  --    Eosinophils % % 1  --    Basophils % % 1  --        Metabolic Panel (last 72 hours):  Recent Labs   Lab Result Units 06/08/24  0440 06/09/24  0831 06/10/24  0353   Sodium mmol/L 135* 137 135*   Potassium mmol/L 3.3* 3.7 3.9   Chloride mmol/L 105 108* 105   CO2 mmol/L 18* 20* 21*   Glucose mg/dL 163* 175* 129*   Blood Urea Nitrogen mg/dL 12.1 10.5 8.2*   Creatinine mg/dL 0.88 0.69* 0.66*   Albumin g/dL 2.3* 2.0* 1.9*   Bilirubin Total mg/dL 3.8* 3.0* 1.8*   ALP unit/L 179* 166* 184*   AST unit/L 80* 45* 45*   ALT unit/L 103* 79* 71*       Drug levels (last 3 results):  No results for input(s): "VANCOMYCINRA", "VANCORANDOM", "VANCOMYCINPE", "VANCOPEAK", "[facility]COMYCINTR", " ""[facility]" in the last 72 hours.    Microbiologic Results:  Microbiology Results (last 7 days)       Procedure Component Value Units Date/Time    Blood Culture [[id number]] Collected: 06/10/24 1116    Order Status: Resulted Specimen: Blood from Forearm, Right Updated: 06/10/24 1132    Blood Culture [[id number]] Collected: 06/10/24 1128    Order Status: Resulted Specimen: Blood from Wrist, Left Updated: 06/10/24 1132    Anaerobic Culture [[id number]] Collected: 06/07/24 1324    Order Status: Completed Specimen: Tissue from Liver Updated: 06/10/24 0717     Anaerobe Culture No Anaerobes Isolated    Tissue Culture - Aerobic [[id number]]  (Abnormal)  (Susceptibility) Collected: 06/07/24 1324    Order Status: Completed Specimen: Tissue from Liver Updated: 06/09/24 0659     Tissue - Aerobic Culture Moderate Klebsiella pneumoniae    Blood culture #2 **CANNOT BE ORDERED STAT** [[id number]]  (Abnormal)  (Susceptibility) Collected: 06/07/24 0406    Order Status: Completed Specimen: Blood Updated: 06/09/24 0617     Blood Culture Klebsiella pneumoniae     GRAM STAIN Gram Negative Rods      Seen in gram stain of broth only      2 of 2 bottles positive    Blood culture #1 **CANNOT BE ORDERED STAT** [[id number]]  (Abnormal)  (Susceptibility) Collected: 06/07/24 0406    Order Status: Completed Specimen: Blood Updated: 06/09/24 0617     Blood Culture Klebsiella pneumoniae     GRAM STAIN Gram Negative Rods      Seen in gram stain of broth only      2 of 2 bottles positive    Chlamydia/GC, PCR [[id number]] Collected: 06/08/24 2100    Order Status: Completed Specimen: Urine Updated: 06/08/24 2235     Chlamydia trachomatis PCR Not Detected     N. gonorrhea PCR Not Detected     Source Urine    Narrative:      The Xpert CT/NG test, performed on the GeneXpert system is a qualitative in vitro real-time polymerase chain reaction (PCR) test for the automated detected and differentiation for genomic DNA from Chlamydia trachomatis (CT) and/or " Neisseria gonorrhoeae (NG).    BCID2 Panel [[id number]]  (Abnormal) Collected: 06/07/24 0406    Order Status: Completed Specimen: Blood Updated: 06/07/24 1926     CTX-M (ESBL ) Not Detected     IMP (Cabapenemase ) Not Detected     KPC resistance gene (Carbapenemase ) Not Detected     mcr-1 Not Detected     mecA ID N/A     Comment: Note: Antimicrobial resistance can occur via multiple mechanisms. A Not Detected result for antimicrobial resistance gene(s) does not indicate antimicrobial susceptibility. Subculturing is required for species identification and susceptibility testing of   isolates.        mecA/C and MREJ (MRSA) gene N/A     NDM (Carbapenemase ) Not Detected     OXA-48-like (Carbapenemase ) Not Detected     Brice/B (VRE gene) N/A     VIM (Carbapenemase ) Not Detected     Enterococcus faecalis Not Detected     Enterococcus faecium Not Detected     Listeria monocytogenes Not Detected     Staphylococcus spp. Not Detected     Staphylococcus aureus Not Detected     Staphylococcus epidermidis Not Detected     Staphylococcus lugdunensis Not Detected     Streptococcus spp. Not Detected     Streptococcus agalactiae (Group B) Not Detected     Streptococcus pneumoniae Not Detected     Streptococcus pyogenes (Group A) Not Detected     Acinetobacter calcoaceticus/baumannii complex Not Detected     Bacteroides fragilis Not Detected     Enterobacterales Detected     Enterobacter cloacae complex Not Detected     Escherichia coli Not Detected     Klebsiella aerogenes Not Detected     Klebsiella oxytoca Not Detected     Klebsiella pneumoniae group Detected     Proteus spp. Not Detected     Salmonella spp. Not Detected     Serratia marcescens Not Detected     Haemophilus influenzae Not Detected     Neisseria meningitidis Not Detected     Pseudomonas aeruginosa Not Detected     Stenotrophomonas maltophilia Not Detected     Candida albicans Not Detected     Candida auris Not  Detected     Candida glabrata Not Detected     Candida krusei Not Detected     Candida parapsilosis Not Detected     Candida tropicalis Not Detected     Cryptococcus neoformans/gattii Not Detected    Narrative:      The [employer] BCID2 Panel is a multiplexed nucleic acid test intended for the use with Waynaut® 2.0 or Waynaut® EzLike Systems for the simultaneous qualitative detection and identification of multiple bacterial and yeast nucleic acids and select genetic determinants associated with antimicrobial resistance.  The Sprinklr BCID2 Panel test is performed directly on blood culture samples identified as positive by a continuous monitoring blood culture system.  Results are intended to be interpreted in conjunction with Gram stain results.    Gram Stain [[id number]] Collected: 06/07/24 1324    Order Status: Completed Specimen: Tissue from Liver Updated: 06/07/24 1504     GRAM STAIN No WBCs, No bacteria seen    Fungal Culture [[id number]] Collected: 06/07/24 1324    Order Status: Sent Specimen: Tissue from Liver Updated: 06/07/24 1324

## 2024-06-10 NOTE — CARE UPDATE
Noted results of the CT scan, worsening hepatic abscesses, patient continues to have fevers, although bacteremia only with Klebsiella, 1 of the abscesses was sampled with Klebsiella also, given multiple other abscesses noted, and worsening on imaging despite antibiotics therapy, would be concerned about potential polymicrobial infection.  Will therefore add metronidazole and vancomycin to current antibiotic therapy.  Continue cefepime.  Consulted Interventional Radiology as well in order to evaluate for drainage of 1 or more of the abscesses and leaving the drain(s) in.  Will hope to deescalate depending on repeat blood cultures cultures from any potential interventions.    Edgar Petersen MD  Infectious Disease  [facility]

## 2024-06-10 NOTE — PROGRESS NOTES
Infectious Disease  Progress Note    Patient Name: Nba Montoya   MRN: [id number]   Admission Date: 6/7/2024   Hospital Length of Stay: 3 days  Attending Physician: Antonietta Gaming DO   Primary Care Provider: No, Primary Doctor     Isolation Status: No active isolations     Assessment/Plan:          43-year-old male who presented to the emergency room with fever for 5 days as well as right upper quadrant abdominal pain.  He was seen in an urgent care several days ago and started on cephalexin for possible UTI, however fevers persisted.  He was noted to be febrile and tachycardic on admit.  CT showed multiple liver lesions concerning for necrotic mass versus abscess.  Patient was originally from the [facility] and relocated here in 2017.  He works as a  in [place].  Reports he returned home for 1 week in February.  Denies ingesting any raw foods, diarrhea, or significant vomiting although does report 1 episode of vomiting earlier this week.  He underwent ultrasound-guided liver biopsy today.  Admit blood cultures are positive for Klebsiella pneumoniae per BCID.  Currently on Zosyn.  We have been consulted for assistance.     Gram-negative bacteremia - Klebsiella   Liver lesion - concerning for abscess +/- necrotic mass  Sepsis  Persistent fever     -Klebsiella pneumoniae noted in cultures from liver biopsy as well as blood cultures  -susceptible to cefepime  -no other organisms detected   -despite appropriate therapy, patient continues to spike fevers   -suspect lack of source control as reason for ongoing fevers and sepsis     Plan:  -continue cefepime 2 g IV q.8 hours   -repeat blood cultures   -secure IV access  -repeat CT scan of the abdomen and pelvis with IV contrast  -likely to require drain in liver lesion if possible as will need source control in order to control his fevers   -Discussed with patient, wife and nursing     Thank you for your consult. ID will continue following. Please contact us  with any questions or concerns.    Antibiotics:  Vancomyin 06/07  Piperacillin/Tazobactam 06/06 - 06/07  Cefepime 06/07 - Present      Portions of this note dictated using EMR integrated voice recognition software, and may be subject to voice recognition errors not corrected at proofreading. Please contact writer for clarification if needed.    Subjective:     Principal Problem: <principal problem not specified>     Interval History:   Continues to be febrile, abdominal pain remains, feels bloated and overall uncomfortable    Review of Systems   Review of Systems   All other systems reviewed and are negative.       Objective:     Vital Signs (Most Recent):  Temp: 98.9 °F (37.2 °C) (06/10/24 0819)  Pulse: 82 (06/10/24 0819)  Resp: 16 (06/10/24 0819)  BP: (!) 161/102 (06/10/24 0819)  SpO2: (!) 94 % (06/10/24 0819)  Vital Signs (24h Range):  Temp:  [98.1 °F (36.7 °C)-101.7 °F (38.7 °C)] 98.9 °F (37.2 °C)  Pulse:  [76-88] 82  Resp:  [16-20] 16  SpO2:  [94 %-98 %] 94 %  BP: (122-161)/() 161/102      Weight:   Wt Readings from Last 1 Encounters:   06/07/24 83.9 kg (185 lb)      Body mass index is Body mass index is 32.77 kg/m².     Estimated Creatinine Clearance: Estimated Creatinine Clearance: 135.3 mL/min (A) (based on SCr of 0.66 mg/dL (L)).     Lines/Drains/Airways       Peripheral Intravenous Line  Duration                  Peripheral IV - Single Lumen 06/07/24 0355 20 G Posterior;Right Hand 3 days                     Physical Exam  Physical Exam  Constitutional:       Appearance: He is obese. He is ill-appearing (although non toxic).   HENT:      Head: Normocephalic and atraumatic.      Mouth/Throat:      Pharynx: No oropharyngeal exudate or posterior oropharyngeal erythema.   Eyes:      Extraocular Movements: Extraocular movements intact.      Pupils: Pupils are equal, round, and reactive to light.   Cardiovascular:      Rate and Rhythm: Normal rate and regular rhythm.      Heart sounds: No murmur  heard.  Pulmonary:      Effort: No respiratory distress.      Breath sounds: No wheezing, rhonchi or rales.   Abdominal:      General: Bowel sounds are normal. There is distension.      Palpations: Abdomen is soft.      Tenderness: There is abdominal tenderness (epigastric and RUQ mostly).   Musculoskeletal:         General: No swelling or tenderness.      Cervical back: Neck supple. No rigidity or tenderness.   Lymphadenopathy:      Cervical: No cervical adenopathy.   Skin:     Findings: No lesion or rash.   Neurological:      General: No focal deficit present.      Mental Status: He is alert and oriented to person, place, and time. Mental status is at baseline.      Cranial Nerves: No cranial nerve deficit.      Motor: No weakness.   Psychiatric:         Mood and Affect: Mood normal.         Behavior: Behavior normal.          Significant Labs: CBC:   Recent Labs   Lab 06/09/24 0831   WBC 11.31   HGB 12.5*   HCT 37.5*        CMP:   Recent Labs   Lab 06/09/24  0831 06/10/24  0353    135*   K 3.7 3.9   * 105   CO2 20* 21*   BUN 10.5 8.2*   CREATININE 0.69* 0.66*   CALCIUM 8.0* 7.8*   ALBUMIN 2.0* 1.9*   BILITOT 3.0* 1.8*   ALKPHOS 166* 184*   AST 45* 45*   ALT 79* 71*       Microbiology Results (last 7 days)       Procedure Component Value Units Date/Time    Blood Culture [[id number]]     Order Status: Sent Specimen: Blood from Arm, Left     Blood Culture [[id number]]     Order Status: Sent Specimen: Blood from Arm, Right     Anaerobic Culture [[id number]] Collected: 06/07/24 1324    Order Status: Completed Specimen: Tissue from Liver Updated: 06/10/24 0717     Anaerobe Culture No Anaerobes Isolated    Tissue Culture - Aerobic [[id number]]  (Abnormal)  (Susceptibility) Collected: 06/07/24 1324    Order Status: Completed Specimen: Tissue from Liver Updated: 06/09/24 0659     Tissue - Aerobic Culture Moderate Klebsiella pneumoniae    Blood culture #2 **CANNOT BE ORDERED STAT** [[id number]]   (Abnormal)  (Susceptibility) Collected: 06/07/24 0406    Order Status: Completed Specimen: Blood Updated: 06/09/24 0617     Blood Culture Klebsiella pneumoniae     GRAM STAIN Gram Negative Rods      Seen in gram stain of broth only      2 of 2 bottles positive    Blood culture #1 **CANNOT BE ORDERED STAT** [[id number]]  (Abnormal)  (Susceptibility) Collected: 06/07/24 0406    Order Status: Completed Specimen: Blood Updated: 06/09/24 0617     Blood Culture Klebsiella pneumoniae     GRAM STAIN Gram Negative Rods      Seen in gram stain of broth only      2 of 2 bottles positive    Chlamydia/GC, PCR [[id number]] Collected: 06/08/24 2100    Order Status: Completed Specimen: Urine Updated: 06/08/24 2235     Chlamydia trachomatis PCR Not Detected     N. gonorrhea PCR Not Detected     Source Urine    Narrative:      The Xpert CT/NG test, performed on the GeneXpert system is a qualitative in vitro real-time polymerase chain reaction (PCR) test for the automated detected and differentiation for genomic DNA from Chlamydia trachomatis (CT) and/or Neisseria gonorrhoeae (NG).    BCID2 Panel [[id number]]  (Abnormal) Collected: 06/07/24 0406    Order Status: Completed Specimen: Blood Updated: 06/07/24 1926     CTX-M (ESBL ) Not Detected     IMP (Cabapenemase ) Not Detected     KPC resistance gene (Carbapenemase ) Not Detected     mcr-1 Not Detected     mecA ID N/A     Comment: Note: Antimicrobial resistance can occur via multiple mechanisms. A Not Detected result for antimicrobial resistance gene(s) does not indicate antimicrobial susceptibility. Subculturing is required for species identification and susceptibility testing of   isolates.        mecA/C and MREJ (MRSA) gene N/A     NDM (Carbapenemase ) Not Detected     OXA-48-like (Carbapenemase ) Not Detected     Brice/B (VRE gene) N/A     VIM (Carbapenemase ) Not Detected     Enterococcus faecalis Not Detected     Enterococcus  faecium Not Detected     Listeria monocytogenes Not Detected     Staphylococcus spp. Not Detected     Staphylococcus aureus Not Detected     Staphylococcus epidermidis Not Detected     Staphylococcus lugdunensis Not Detected     Streptococcus spp. Not Detected     Streptococcus agalactiae (Group B) Not Detected     Streptococcus pneumoniae Not Detected     Streptococcus pyogenes (Group A) Not Detected     Acinetobacter calcoaceticus/baumannii complex Not Detected     Bacteroides fragilis Not Detected     Enterobacterales Detected     Enterobacter cloacae complex Not Detected     Escherichia coli Not Detected     Klebsiella aerogenes Not Detected     Klebsiella oxytoca Not Detected     Klebsiella pneumoniae group Detected     Proteus spp. Not Detected     Salmonella spp. Not Detected     Serratia marcescens Not Detected     Haemophilus influenzae Not Detected     Neisseria meningitidis Not Detected     Pseudomonas aeruginosa Not Detected     Stenotrophomonas maltophilia Not Detected     Candida albicans Not Detected     Candida auris Not Detected     Candida glabrata Not Detected     Candida krusei Not Detected     Candida parapsilosis Not Detected     Candida tropicalis Not Detected     Cryptococcus neoformans/gattii Not Detected    Narrative:      The [employer] BCID2 Panel is a multiplexed nucleic acid test intended for the use with [employer]® 2.0 or [employer]® [employer] Systems for the simultaneous qualitative detection and identification of multiple bacterial and yeast nucleic acids and select genetic determinants associated with antimicrobial resistance.  The [employer] BCID2 Panel test is performed directly on blood culture samples identified as positive by a continuous monitoring blood culture system.  Results are intended to be interpreted in conjunction with Gram stain results.    Gram Stain [[id number]] Collected: 06/07/24 1324    Order Status: Completed Specimen: Tissue from Liver Updated:  06/07/24 1504     GRAM STAIN No WBCs, No bacteria seen    Fungal Culture [[id number]] Collected: 06/07/24 1324    Order Status: Sent Specimen: Tissue from Liver Updated: 06/07/24 1324             Significant Imaging: I have reviewed all pertinent imaging results/findings within the past 24 hours.      Edgar Petersen MD  Infectious Disease  [facility]

## 2024-06-10 NOTE — PROGRESS NOTES
[facility] Hospital Medicine Progress Note        Chief Complaint: Inpatient Follow-up for     HPI:   43-year-old male with a history of hyperlipidemia currently on fish oil.  He is coming in with complaints of fevers for 5 days.  He was prescribed cephalexin for possible UTI were continued to have fevers.  Also has right upper quadrant abdominal pain.  Denies eating any raw food, no nausea or vomiting, no diarrhea, no dysuria.  He states that the last time he went to the [facility] was in 2019 which is where he is originally from.  Currently works as a  in [place].  Upon arrival he is having fever of 103 F, tachycardic 140s.  No leukocytosis.  CT imaging shows few irregular defined right and caudate lobe liver lesions concerning for necrotic mass versus abscess.    Patient had CTA of the liver biopsy done on 06/07 which showed largely neutrophils.  Infectious Disease was consulted recommend to stop vancomycin and Zosyn and start cefepime.  Cefepime was started however patient continued to remain febrile.  Culture showed Klebsiella was sensitive to cefepime.    Interval Hx:   Patient seen and examined by bedside.  No acute overnight events.  He has had some abdominal pain and bloating.  Wife by bedside.    Case was discussed with patient's nurse and  on the floor.    Objective/physical exam:  General: In no acute distress, afebrile; scleral icterus  Chest: Clear to auscultation bilaterally  Heart: RRR, +S1, S2, no appreciable murmur  Abdomen: Soft, nontender, BS +  MSK: Warm, no lower extremity edema, no clubbing or cyanosis  Neurologic: Alert and oriented x4, Cranial nerve II-XII intact, Strength 5/5 in all 4 extremities    VITAL SIGNS: 24 HRS MIN & MAX LAST   Temp  Min: 98.1 °F (36.7 °C)  Max: 101.7 °F (38.7 °C) 99.2 °F (37.3 °C)   BP  Min: 122/88  Max: 161/102 (!) 161/102   Pulse  Min: 76  Max: 88  82   Resp  Min: 16  Max: 20 16   SpO2  Min: 94 %  Max:  98 % (!) 94 %     I have reviewed the following labs:  Recent Labs   Lab 06/07/24  0406 06/08/24 0440 06/09/24  0831   WBC 3.59* 4.3  4.30* 11.31   RBC 4.69* 4.40* 4.20*   HGB 14.1 13.0* 12.5*   HCT 42.1 38.5* 37.5*   MCV 89.8 87.5 89.3   MCH 30.1 29.5 29.8   MCHC 33.5 33.8 33.3   RDW 13.8 14.2 14.8    115* 130   MPV 10.6* 10.7* 11.9*     Recent Labs   Lab 06/08/24 0440 06/09/24  0831 06/10/24  0353   * 137 135*   K 3.3* 3.7 3.9    108* 105   CO2 18* 20* 21*   BUN 12.1 10.5 8.2*   CREATININE 0.88 0.69* 0.66*   CALCIUM 8.1* 8.0* 7.8*   ALBUMIN 2.3* 2.0* 1.9*   ALKPHOS 179* 166* 184*   * 79* 71*   AST 80* 45* 45*   BILITOT 3.8* 3.0* 1.8*     Microbiology Results (last 7 days)       Procedure Component Value Units Date/Time    Blood Culture [[id number]]     Order Status: Sent Specimen: Blood from Arm, Left     Blood Culture [[id number]]     Order Status: Sent Specimen: Blood from Arm, Right     Anaerobic Culture [[id number]] Collected: 06/07/24 1324    Order Status: Completed Specimen: Tissue from Liver Updated: 06/10/24 0717     Anaerobe Culture No Anaerobes Isolated    Tissue Culture - Aerobic [[id number]]  (Abnormal)  (Susceptibility) Collected: 06/07/24 1324    Order Status: Completed Specimen: Tissue from Liver Updated: 06/09/24 0659     Tissue - Aerobic Culture Moderate Klebsiella pneumoniae    Blood culture #2 **CANNOT BE ORDERED STAT** [[id number]]  (Abnormal)  (Susceptibility) Collected: 06/07/24 0406    Order Status: Completed Specimen: Blood Updated: 06/09/24 0617     Blood Culture Klebsiella pneumoniae     GRAM STAIN Gram Negative Rods      Seen in gram stain of broth only      2 of 2 bottles positive    Blood culture #1 **CANNOT BE ORDERED STAT** [[id number]]  (Abnormal)  (Susceptibility) Collected: 06/07/24 0406    Order Status: Completed Specimen: Blood Updated: 06/09/24 0617     Blood Culture Klebsiella pneumoniae     GRAM STAIN Gram Negative Rods      Seen in gram stain of  broth only      2 of 2 bottles positive    Chlamydia/GC, PCR [[id number]] Collected: 06/08/24 2100    Order Status: Completed Specimen: Urine Updated: 06/08/24 2235     Chlamydia trachomatis PCR Not Detected     N. gonorrhea PCR Not Detected     Source Urine    Narrative:      The Xpert CT/NG test, performed on the GeneXpert system is a qualitative in vitro real-time polymerase chain reaction (PCR) test for the automated detected and differentiation for genomic DNA from Chlamydia trachomatis (CT) and/or Neisseria gonorrhoeae (NG).    BCID2 Panel [[id number]]  (Abnormal) Collected: 06/07/24 0406    Order Status: Completed Specimen: Blood Updated: 06/07/24 1926     CTX-M (ESBL ) Not Detected     IMP (Cabapenemase ) Not Detected     KPC resistance gene (Carbapenemase ) Not Detected     mcr-1 Not Detected     mecA ID N/A     Comment: Note: Antimicrobial resistance can occur via multiple mechanisms. A Not Detected result for antimicrobial resistance gene(s) does not indicate antimicrobial susceptibility. Subculturing is required for species identification and susceptibility testing of   isolates.        mecA/C and MREJ (MRSA) gene N/A     NDM (Carbapenemase ) Not Detected     OXA-48-like (Carbapenemase ) Not Detected     Brice/B (VRE gene) N/A     VIM (Carbapenemase ) Not Detected     Enterococcus faecalis Not Detected     Enterococcus faecium Not Detected     Listeria monocytogenes Not Detected     Staphylococcus spp. Not Detected     Staphylococcus aureus Not Detected     Staphylococcus epidermidis Not Detected     Staphylococcus lugdunensis Not Detected     Streptococcus spp. Not Detected     Streptococcus agalactiae (Group B) Not Detected     Streptococcus pneumoniae Not Detected     Streptococcus pyogenes (Group A) Not Detected     Acinetobacter calcoaceticus/baumannii complex Not Detected     Bacteroides fragilis Not Detected     Enterobacterales Detected      Enterobacter cloacae complex Not Detected     Escherichia coli Not Detected     Klebsiella aerogenes Not Detected     Klebsiella oxytoca Not Detected     Klebsiella pneumoniae group Detected     Proteus spp. Not Detected     Salmonella spp. Not Detected     Serratia marcescens Not Detected     Haemophilus influenzae Not Detected     Neisseria meningitidis Not Detected     Pseudomonas aeruginosa Not Detected     Stenotrophomonas maltophilia Not Detected     Candida albicans Not Detected     Candida auris Not Detected     Candida glabrata Not Detected     Candida krusei Not Detected     Candida parapsilosis Not Detected     Candida tropicalis Not Detected     Cryptococcus neoformans/gattii Not Detected    Narrative:      The [employer] BCID2 Panel is a multiplexed nucleic acid test intended for the use with [employer]® 2.0 or [employer]® [employer] Systems for the simultaneous qualitative detection and identification of multiple bacterial and yeast nucleic acids and select genetic determinants associated with antimicrobial resistance.  The [employer] BCID2 Panel test is performed directly on blood culture samples identified as positive by a continuous monitoring blood culture system.  Results are intended to be interpreted in conjunction with Gram stain results.    Gram Stain [[id number]] Collected: 06/07/24 1324    Order Status: Completed Specimen: Tissue from Liver Updated: 06/07/24 1504     GRAM STAIN No WBCs, No bacteria seen    Fungal Culture [[id number]] Collected: 06/07/24 1324    Order Status: Sent Specimen: Tissue from Liver Updated: 06/07/24 1324             See below for Radiology    Scheduled Med:   ceFEPime IV (PEDS and ADULTS)  2 g Intravenous Q8H    enoxparin  40 mg Subcutaneous Daily      Continuous Infusions:   lactated ringers   Intravenous Continuous 100 mL/hr at 06/09/24 1301 New Bag at 06/09/24 1301      PRN Meds:    Current Facility-Administered Medications:     acetaminophen, 650 mg, Oral,  Q8H PRN    dextrose 10%, 12.5 g, Intravenous, PRN    dextrose 10%, 25 g, Intravenous, PRN    glucagon (human recombinant), 1 mg, Intramuscular, PRN    glucose, 16 g, Oral, PRN    glucose, 24 g, Oral, PRN    ibuprofen, 400 mg, Oral, Q6H PRN    sodium chloride 0.9%, 10 mL, Intravenous, Q12H PRN     Patient meets ASPEN criteria for   malnutrition of   per RD assessment as evidenced by:                       A minimum of two characteristics is recommended for diagnosis of either severe or non-severe malnutrition.    Assessment/Plan:  Sepsis, secondary to bacteremia  Klebsiella Bacteremia, secondary to liver lesion  Liver lesion, concerning for abscess plus or minus necrotic mass  Hyperbilirubinemia, largely direct; likely secondary to abscess.  CT abdomen and pelvis showed normal appearing gallbladder, spleen, pancreas, and adrenals.  Pancytopenia, unclear etiology   Transaminitis, likely secondary to infection and abscess  History of GERD    Patient is status post liver biopsy done on 06/08 for concern of mass  Per report pathology stated predominantly neutrophils , malignancy unlikely;   2/2 blood cultures positive for   Klebsiella, likely secondary to the liver abscess   Aerobic culture resulted as moderate Klebsiella pneumoniae, largely pansensitive  Infectious disease following, continue cefepime, day 3   Patient continues to however remain febrile    Discussed with Radiology, per Radiology no abscess collection seen on  last CT scan to be drained   We will repeat CT scan evaluate further due to continuous febrile episode  Continue IV fluids   Hyperbilirubinemia along with liver enzymes slowly improving  Further recs based on clinical course   Labs in a.m.      Critical care note:  Critical care diagnosis:  Sepsis secondary to liver abscess  Critical care interventions: Hands-on evaluation, review of labs/radiographs/records and discussion with patient and family if present  Critical care time spent: 35 minutes      VTE prophylaxis:  Lovenox    Patient condition: fair    Anticipated discharge and Disposition:   Likely home      All diagnosis and differential diagnosis have been reviewed; assessment and plan has been documented; I have personally reviewed the labs and test results that are presently available; I have reviewed the patients medication list; I have reviewed the consulting providers response and recommendations. I have reviewed or attempted to review medical records based upon their availability    All of the patient's questions have been  addressed and answered. Patient's is agreeable to the above stated plan. I will continue to monitor closely and make adjustments to medical management as needed.  _____________________________________________________________________    Nutrition Status:    Radiology:  I have personally reviewed the following imaging and agree with the radiologist.     CT Biopsy Liver (xpd)  Addendum: The above report is 4 drainage.  Correct report below.     Examination: IR biopsy     Clinical history: Hepatic lesions which may be infectious.  Malignancy not  excluded     Technique: CT-guided biopsy of right hepatic lesion.     DLP: 418     Comparison: CT from earlier.     Findings: The risk and benefits were explained to the patient expressed  understanding.  The soft tissues were anesthetized with a lidocaine  solution.  Under CT guidance a needle was advanced to the right hepatic  lesion.  Two biopsies were obtained.  The pathologist stated predominantly  neutrophils.  The second biopsy was sent to microbiology for culture.     Impression: Successful biopsy.     Electronically signed by: Kvng Yu   Date:    06/07/2024   Time:    14:59  Narrative: EXAMINATION:  CT BIOPSY LIVER (XPD)    CLINICAL HISTORY:  liver lesion/infectious process;    ANESTHESIA: Lidocaine was utilized for local anesthesia.    TECHNIQUE:  CT guided drainage of a right hepatic collection.    DLP:  418    COMPARISON:  No relevant prior available for comparison.    FINDINGS:  The risks, benefits, and potential complications were discussed including bleeding, infection, end organ damage. Informed consent was obtained.    The patient was positioned in the left lateral decubitus position. The right hepatic abscess was localized CT.  The skin was marked and the area was prepped and draped in a sterile fashion.    The site was anesthetized with 1% lidocaine solution. A 19 gauge, 5 cm coaxial needle was advanced into the collection, and a guidewire was then placed through the needle. The track was then dilated and a 12 [place] drain was placed. The drain was sutured to the skin and attached to a KARINA bulb. There was 120 cc of pus fluid drained.    The patient tolerated the procedure without difficulty. Post-procedure imaging demonstrated a well-positioned catheter.  There were no immediate post-procedure complications. The patient was discharged to his room in stable condition.  Impression: Successful placement of a 12 [place] drain into the right hepatic abscess.  Samples were collected and sent to the lab for analysis.    Electronically signed by: Knvg Yu  Date:    06/07/2024  Time:    14:54  CT Abdomen Pelvis With IV Contrast NO Oral Contrast  Narrative: EXAMINATION:  CT ABDOMEN PELVIS WITH IV CONTRAST    CLINICAL HISTORY:  RUQ pain;    TECHNIQUE:  Helical acquisition through the abdomen and pelvis with IV contrast.  Three plane reconstructions were provided for review.  mGycm. Automatic exposure control, adjustment of mA/kV or iterative reconstruction technique was used to reduce radiation.    COMPARISON:  No prior    FINDINGS:  There is bibasilar atelectasis or scarring.    There are 3 hypoattenuating liver lesions.  The largest is in the caudate measuring up to about 5 cm.  Patent portal vein.    No significant abnormality of the gallbladder, spleen, pancreas or adrenals.  No hydronephrosis or  suspicious renal lesion.    No bowel obstruction.  Normal appendix.  Some rectal wall thickening is favored related to under distension.  No significantly enlarged mesenteric lymph nodes.    Urinary bladder not well distended.  No pelvic free fluid.  Abdominal aorta normal in caliber.    There are no acute osseous findings.  Impression: Hypoattenuating liver lesions could relate to metastatic disease or infection.    Electronically signed by: Ivan Mckeon  Date:    06/07/2024  Time:    11:43  X-Ray Chest AP Portable  Narrative: EXAMINATION:  XR CHEST AP PORTABLE    CLINICAL HISTORY:  Fever, unspecified    COMPARISON:  No priors    FINDINGS:  Frontal view of the chest was obtained. The heart is not enlarged.  Lungs are grossly clear.  There is no pneumothorax or significant effusion.  Impression: No acute findings.    Electronically signed by: Ivan Mckeon  Date:    06/07/2024  Time:    06:00      Antonietta Gaming DO  Department of Hospital Medicine  [facility]  06/10/2024

## 2024-06-11 ENCOUNTER — HOSPITAL ENCOUNTER (INPATIENT)
Facility: HOSPITAL | Age: 44
LOS: 3 days | Discharge: HOME HEALTH | DRG: 871 | End: 2024-06-14
Attending: INTERNAL MEDICINE | Admitting: INTERNAL MEDICINE
Payer: COMMERCIAL

## 2024-06-11 VITALS
SYSTOLIC BLOOD PRESSURE: 156 MMHG | RESPIRATION RATE: 18 BRPM | OXYGEN SATURATION: 99 % | TEMPERATURE: 101 F | BODY MASS INDEX: 32.78 KG/M2 | HEART RATE: 99 BPM | DIASTOLIC BLOOD PRESSURE: 89 MMHG | WEIGHT: 185 LBS | HEIGHT: 63 IN

## 2024-06-11 DIAGNOSIS — K75.0 LIVER ABSCESS: Primary | ICD-10-CM

## 2024-06-11 DIAGNOSIS — K75.0 HEPATIC ABSCESS: ICD-10-CM

## 2024-06-11 DIAGNOSIS — R07.9 CHEST PAIN: ICD-10-CM

## 2024-06-11 LAB
ALBUMIN SERPL-MCNC: 2.1 G/DL (ref 3.5–5)
ALBUMIN/GLOB SERPL: 0.4 RATIO (ref 1.1–2)
ALP SERPL-CCNC: 302 UNIT/L (ref 40–150)
ALT SERPL-CCNC: 93 UNIT/L (ref 0–55)
ANION GAP SERPL CALC-SCNC: 9 MEQ/L
AST SERPL-CCNC: 74 UNIT/L (ref 5–34)
BASOPHILS # BLD AUTO: 0.06 X10(3)/MCL
BASOPHILS NFR BLD AUTO: 0.4 %
BILIRUB SERPL-MCNC: 1.8 MG/DL
BUN SERPL-MCNC: 6.4 MG/DL (ref 8.9–20.6)
CALCIUM SERPL-MCNC: 8.6 MG/DL (ref 8.4–10.2)
CHLORIDE SERPL-SCNC: 104 MMOL/L (ref 98–107)
CO2 SERPL-SCNC: 24 MMOL/L (ref 22–29)
CREAT SERPL-MCNC: 0.68 MG/DL (ref 0.73–1.18)
CREAT/UREA NIT SERPL: 9
EOSINOPHIL # BLD AUTO: 0.03 X10(3)/MCL (ref 0–0.9)
EOSINOPHIL NFR BLD AUTO: 0.2 %
ERYTHROCYTE [DISTWIDTH] IN BLOOD BY AUTOMATED COUNT: 14.6 % (ref 11.5–17)
GFR SERPLBLD CREATININE-BSD FMLA CKD-EPI: >60 ML/MIN/1.73/M2
GLOBULIN SER-MCNC: 4.7 GM/DL (ref 2.4–3.5)
GLUCOSE SERPL-MCNC: 130 MG/DL (ref 74–100)
HCT VFR BLD AUTO: 38.6 % (ref 42–52)
HGB BLD-MCNC: 12.7 G/DL (ref 14–18)
IMM GRANULOCYTES # BLD AUTO: 0.2 X10(3)/MCL (ref 0–0.04)
IMM GRANULOCYTES NFR BLD AUTO: 1.3 %
LYMPHOCYTES # BLD AUTO: 1.55 X10(3)/MCL (ref 0.6–4.6)
LYMPHOCYTES NFR BLD AUTO: 10.4 %
MCH RBC QN AUTO: 29.3 PG (ref 27–31)
MCHC RBC AUTO-ENTMCNC: 32.9 G/DL (ref 33–36)
MCV RBC AUTO: 88.9 FL (ref 80–94)
MONOCYTES # BLD AUTO: 0.81 X10(3)/MCL (ref 0.1–1.3)
MONOCYTES NFR BLD AUTO: 5.5 %
NEUTROPHILS # BLD AUTO: 12.19 X10(3)/MCL (ref 2.1–9.2)
NEUTROPHILS NFR BLD AUTO: 82.2 %
NRBC BLD AUTO-RTO: 0 %
PLATELET # BLD AUTO: 318 X10(3)/MCL (ref 130–400)
PMV BLD AUTO: 11.2 FL (ref 7.4–10.4)
POTASSIUM SERPL-SCNC: 3.6 MMOL/L (ref 3.5–5.1)
PROT SERPL-MCNC: 6.8 GM/DL (ref 6.4–8.3)
RBC # BLD AUTO: 4.34 X10(6)/MCL (ref 4.7–6.1)
SODIUM SERPL-SCNC: 137 MMOL/L (ref 136–145)
WBC # SPEC AUTO: 14.84 X10(3)/MCL (ref 4.5–11.5)

## 2024-06-11 PROCEDURE — 25000003 PHARM REV CODE 250: Performed by: GENERAL PRACTICE

## 2024-06-11 PROCEDURE — 99233 SBSQ HOSP IP/OBS HIGH 50: CPT | Mod: ,,, | Performed by: GENERAL PRACTICE

## 2024-06-11 PROCEDURE — 63600175 PHARM REV CODE 636 W HCPCS: Performed by: GENERAL PRACTICE

## 2024-06-11 PROCEDURE — 80053 COMPREHEN METABOLIC PANEL: CPT | Performed by: STUDENT IN AN ORGANIZED HEALTH CARE EDUCATION/TRAINING PROGRAM

## 2024-06-11 PROCEDURE — 25000003 PHARM REV CODE 250: Performed by: NURSE PRACTITIONER

## 2024-06-11 PROCEDURE — 36415 COLL VENOUS BLD VENIPUNCTURE: CPT | Performed by: NURSE PRACTITIONER

## 2024-06-11 PROCEDURE — 36415 COLL VENOUS BLD VENIPUNCTURE: CPT | Performed by: STUDENT IN AN ORGANIZED HEALTH CARE EDUCATION/TRAINING PROGRAM

## 2024-06-11 PROCEDURE — 63600175 PHARM REV CODE 636 W HCPCS: Performed by: STUDENT IN AN ORGANIZED HEALTH CARE EDUCATION/TRAINING PROGRAM

## 2024-06-11 PROCEDURE — 21400001 HC TELEMETRY ROOM

## 2024-06-11 PROCEDURE — 85025 COMPLETE CBC W/AUTO DIFF WBC: CPT | Performed by: NURSE PRACTITIONER

## 2024-06-11 PROCEDURE — 25000003 PHARM REV CODE 250: Performed by: STUDENT IN AN ORGANIZED HEALTH CARE EDUCATION/TRAINING PROGRAM

## 2024-06-11 RX ORDER — ENOXAPARIN SODIUM 100 MG/ML
40 INJECTION SUBCUTANEOUS DAILY
Status: ON HOLD
Start: 2024-06-11 | End: 2024-06-14 | Stop reason: HOSPADM

## 2024-06-11 RX ORDER — IBUPROFEN 200 MG
24 TABLET ORAL
Status: DISCONTINUED | OUTPATIENT
Start: 2024-06-11 | End: 2024-06-14 | Stop reason: HOSPADM

## 2024-06-11 RX ORDER — POLYETHYLENE GLYCOL 3350 17 G/17G
17 POWDER, FOR SOLUTION ORAL DAILY PRN
Status: DISCONTINUED | OUTPATIENT
Start: 2024-06-11 | End: 2024-06-14 | Stop reason: HOSPADM

## 2024-06-11 RX ORDER — NALOXONE HCL 0.4 MG/ML
0.02 VIAL (ML) INJECTION
Status: DISCONTINUED | OUTPATIENT
Start: 2024-06-11 | End: 2024-06-14 | Stop reason: HOSPADM

## 2024-06-11 RX ORDER — HYDROCODONE BITARTRATE AND ACETAMINOPHEN 5; 325 MG/1; MG/1
1 TABLET ORAL EVERY 6 HOURS PRN
Status: DISCONTINUED | OUTPATIENT
Start: 2024-06-11 | End: 2024-06-14 | Stop reason: HOSPADM

## 2024-06-11 RX ORDER — SIMETHICONE 80 MG
1 TABLET,CHEWABLE ORAL 4 TIMES DAILY PRN
Status: DISCONTINUED | OUTPATIENT
Start: 2024-06-11 | End: 2024-06-14 | Stop reason: HOSPADM

## 2024-06-11 RX ORDER — ACETAMINOPHEN 325 MG/1
650 TABLET ORAL EVERY 8 HOURS PRN
Status: DISCONTINUED | OUTPATIENT
Start: 2024-06-11 | End: 2024-06-14 | Stop reason: HOSPADM

## 2024-06-11 RX ORDER — MORPHINE SULFATE 2 MG/ML
2 INJECTION, SOLUTION INTRAMUSCULAR; INTRAVENOUS EVERY 4 HOURS PRN
Status: DISCONTINUED | OUTPATIENT
Start: 2024-06-11 | End: 2024-06-14 | Stop reason: HOSPADM

## 2024-06-11 RX ORDER — ONDANSETRON HYDROCHLORIDE 2 MG/ML
4 INJECTION, SOLUTION INTRAVENOUS EVERY 8 HOURS PRN
Status: DISCONTINUED | OUTPATIENT
Start: 2024-06-11 | End: 2024-06-14 | Stop reason: HOSPADM

## 2024-06-11 RX ORDER — METRONIDAZOLE 500 MG/1
500 TABLET ORAL EVERY 8 HOURS
Status: ON HOLD
Start: 2024-06-11 | End: 2024-06-14 | Stop reason: HOSPADM

## 2024-06-11 RX ORDER — PROMETHAZINE HYDROCHLORIDE 25 MG/1
25 TABLET ORAL EVERY 6 HOURS PRN
Status: DISCONTINUED | OUTPATIENT
Start: 2024-06-11 | End: 2024-06-14 | Stop reason: HOSPADM

## 2024-06-11 RX ORDER — ACETAMINOPHEN 650 MG/1
650 SUPPOSITORY RECTAL EVERY 4 HOURS PRN
Status: DISCONTINUED | OUTPATIENT
Start: 2024-06-11 | End: 2024-06-14 | Stop reason: HOSPADM

## 2024-06-11 RX ORDER — GLUCAGON 1 MG
1 KIT INJECTION
Status: DISCONTINUED | OUTPATIENT
Start: 2024-06-11 | End: 2024-06-14 | Stop reason: HOSPADM

## 2024-06-11 RX ORDER — TALC
6 POWDER (GRAM) TOPICAL NIGHTLY PRN
Status: DISCONTINUED | OUTPATIENT
Start: 2024-06-11 | End: 2024-06-14 | Stop reason: HOSPADM

## 2024-06-11 RX ORDER — SODIUM CHLORIDE 9 MG/ML
INJECTION, SOLUTION INTRAVENOUS CONTINUOUS
Status: DISCONTINUED | OUTPATIENT
Start: 2024-06-11 | End: 2024-06-14

## 2024-06-11 RX ORDER — SODIUM CHLORIDE 0.9 % (FLUSH) 0.9 %
3 SYRINGE (ML) INJECTION EVERY 12 HOURS PRN
Status: DISCONTINUED | OUTPATIENT
Start: 2024-06-11 | End: 2024-06-14 | Stop reason: HOSPADM

## 2024-06-11 RX ORDER — IBUPROFEN 200 MG
16 TABLET ORAL
Status: DISCONTINUED | OUTPATIENT
Start: 2024-06-11 | End: 2024-06-14 | Stop reason: HOSPADM

## 2024-06-11 RX ORDER — ALUMINUM HYDROXIDE, MAGNESIUM HYDROXIDE, AND SIMETHICONE 1200; 120; 1200 MG/30ML; MG/30ML; MG/30ML
30 SUSPENSION ORAL 4 TIMES DAILY PRN
Status: DISCONTINUED | OUTPATIENT
Start: 2024-06-11 | End: 2024-06-14 | Stop reason: HOSPADM

## 2024-06-11 RX ADMIN — CEFEPIME 2 G: 2 INJECTION, POWDER, FOR SOLUTION INTRAVENOUS at 07:06

## 2024-06-11 RX ADMIN — METRONIDAZOLE 500 MG: 500 TABLET ORAL at 03:06

## 2024-06-11 RX ADMIN — SODIUM CHLORIDE, POTASSIUM CHLORIDE, SODIUM LACTATE AND CALCIUM CHLORIDE: 600; 310; 30; 20 INJECTION, SOLUTION INTRAVENOUS at 04:06

## 2024-06-11 RX ADMIN — METRONIDAZOLE 500 MG: 500 TABLET ORAL at 05:06

## 2024-06-11 RX ADMIN — SODIUM CHLORIDE: 9 INJECTION, SOLUTION INTRAVENOUS at 09:06

## 2024-06-11 RX ADMIN — CEFEPIME 2 G: 2 INJECTION, POWDER, FOR SOLUTION INTRAVENOUS at 01:06

## 2024-06-11 RX ADMIN — ALUMINUM HYDROXIDE, MAGNESIUM HYDROXIDE, AND SIMETHICONE 30 ML: 1200; 120; 1200 SUSPENSION ORAL at 10:06

## 2024-06-11 RX ADMIN — VANCOMYCIN HYDROCHLORIDE 1500 MG: 1.5 INJECTION, POWDER, LYOPHILIZED, FOR SOLUTION INTRAVENOUS at 05:06

## 2024-06-11 RX ADMIN — HYDRALAZINE HYDROCHLORIDE 10 MG: 20 INJECTION INTRAMUSCULAR; INTRAVENOUS at 03:06

## 2024-06-11 RX ADMIN — IBUPROFEN 400 MG: 400 TABLET ORAL at 03:06

## 2024-06-11 NOTE — DISCHARGE SUMMARY
[facility] Hospital Medicine Discharge Summary    Admit Date: 6/7/2024  Discharge Date and Time: 6/11/20241:15 PM  Admitting Physician: BRIANDA Team  Discharging Physician: Antonietta Gaming DO.  Primary Care Physician: Jes, Primary Doctor  Consults: Interventional Radiology and Infectious Disease    Discharge Diagnoses:  Sepsis, secondary to bacteremia  Klebsiella Bacteremia, secondary to liver lesion  Liver lesion, concerning for abscess plus or minus necrotic mass  Hyperbilirubinemia, largely direct; likely secondary to abscess.  CT abdomen and pelvis showed normal appearing gallbladder, spleen, pancreas, and adrenals.  Pancytopenia, unclear etiology   Transaminitis, likely secondary to infection and abscess  History of GERD       Hospital Course:   43-year-old male with a history of hyperlipidemia currently on fish oil.  He is coming in with complaints of fevers for 5 days.  He was prescribed cephalexin for possible UTI were continued to have fevers.  Also has right upper quadrant abdominal pain.  Denies eating any raw food, no nausea or vomiting, no diarrhea, no dysuria.  He states that the last time he went to the [facility] was in 2019 which is where he is originally from.  Currently works as a  in [place].  Upon arrival he is having fever of 103 F, tachycardic 140s.  No leukocytosis.  CT imaging shows few irregular defined right and caudate lobe liver lesions concerning for necrotic mass versus abscess.     Patient had CTA of the liver biopsy done on 06/07 which showed largely neutrophils.  Infectious Disease was consulted recommend to stop vancomycin and Zosyn and start cefepime.  Cefepime was started however patient continued to remain febrile.  Culture showed Klebsiella was sensitive to cefepime.  Blood cultures were also positive for Klebsiella secondary to the hepatic abscess.  Repeat blood cultures done on 06/10 showed no growth to date    Patient continues to  remain febrile and clinically worsening.  Repeat CT scan was done which showed worsening hepatic abscesses.  Infectious Disease added metronidazole and vancomycin to current antibiotic therapy concerning for potential polymicrobial infection.  IR was consulted to evaluate for drainage of 1 or more of the abscesses and leaving the drain in.  IR status of the large sepsis in the caudate has increase in size and no percutaneous access was available.  Recommended transfer for higher level of care with increasing size.  Transfer center was notified and transfer was initiated.  Patient will be going to [facility] for drain placement and will be stay there to monitoring for drain in infection.  Discussed plan with patient and his wife and are agreeable.      Pt was seen and examined on the day of discharge  Vitals:  VITAL SIGNS: 24 HRS MIN & MAX LAST   Temp  Min: 98.3 °F (36.8 °C)  Max: 100.7 °F (38.2 °C) 98.3 °F (36.8 °C)   BP  Min: 132/80  Max: 184/115 (!) 154/91   Pulse  Min: 79  Max: 112  86   Resp  Min: 18  Max: 19 18   SpO2  Min: 95 %  Max: 97 % 97 %       Physical Exam:  General: In no acute distress, afebrile; scleral icterus  Chest: Clear to auscultation bilaterally  Heart: RRR, +S1, S2, no appreciable murmur  Abdomen: Soft, nontender, BS +  MSK: Warm, no lower extremity edema, no clubbing or cyanosis  Neurologic: Alert and oriented x4, Cranial nerve II-XII intact, Strength 5/5 in all 4 extremities    Procedures Performed: No admission procedures for hospital encounter.     Significant Diagnostic Studies: See Full reports for all details    Recent Labs   Lab 06/08/24  0440 06/09/24  0831 06/11/24  0656   WBC 4.3  4.30* 11.31 14.84*   RBC 4.40* 4.20* 4.34*   HGB 13.0* 12.5* 12.7*   HCT 38.5* 37.5* 38.6*   MCV 87.5 89.3 88.9   MCH 29.5 29.8 29.3   MCHC 33.8 33.3 32.9*   RDW 14.2 14.8 14.6   * 130 318   MPV 10.7* 11.9* 11.2*       Recent Labs   Lab 06/09/24  0831 06/10/24  0353 06/11/24  0450   NA  137 135* 137   K 3.7 3.9 3.6   * 105 104   CO2 20* 21* 24   BUN 10.5 8.2* 6.4*   CREATININE 0.69* 0.66* 0.68*   CALCIUM 8.0* 7.8* 8.6   ALBUMIN 2.0* 1.9* 2.1*   ALKPHOS 166* 184* 302*   ALT 79* 71* 93*   AST 45* 45* 74*   BILITOT 3.0* 1.8* 1.8*        Microbiology Results (last 7 days)       Procedure Component Value Units Date/Time    Blood Culture [[id number]]  (Normal) Collected: 06/10/24 1116    Order Status: Completed Specimen: Blood from Forearm, Right Updated: 06/11/24 1300     Blood Culture No Growth At 24 Hours    Blood Culture [[id number]]  (Normal) Collected: 06/10/24 1128    Order Status: Completed Specimen: Blood from Wrist, Left Updated: 06/11/24 1300     Blood Culture No Growth At 24 Hours    Anaerobic Culture [[id number]] Collected: 06/07/24 1324    Order Status: Completed Specimen: Tissue from Liver Updated: 06/10/24 0717     Anaerobe Culture No Anaerobes Isolated    Tissue Culture - Aerobic [[id number]]  (Abnormal)  (Susceptibility) Collected: 06/07/24 1324    Order Status: Completed Specimen: Tissue from Liver Updated: 06/09/24 0659     Tissue - Aerobic Culture Moderate Klebsiella pneumoniae    Blood culture #2 **CANNOT BE ORDERED STAT** [[id number]]  (Abnormal)  (Susceptibility) Collected: 06/07/24 0406    Order Status: Completed Specimen: Blood Updated: 06/09/24 0617     Blood Culture Klebsiella pneumoniae     GRAM STAIN Gram Negative Rods      Seen in gram stain of broth only      2 of 2 bottles positive    Blood culture #1 **CANNOT BE ORDERED STAT** [[id number]]  (Abnormal)  (Susceptibility) Collected: 06/07/24 0406    Order Status: Completed Specimen: Blood Updated: 06/09/24 0617     Blood Culture Klebsiella pneumoniae     GRAM STAIN Gram Negative Rods      Seen in gram stain of broth only      2 of 2 bottles positive    Chlamydia/GC, PCR [[id number]] Collected: 06/08/24 2100    Order Status: Completed Specimen: Urine Updated: 06/08/24 2235     Chlamydia trachomatis PCR Not Detected      N. gonorrhea PCR Not Detected     Source Urine    Narrative:      The Xpert CT/NG test, performed on the GeneXpert system is a qualitative in vitro real-time polymerase chain reaction (PCR) test for the automated detected and differentiation for genomic DNA from Chlamydia trachomatis (CT) and/or Neisseria gonorrhoeae (NG).    BCID2 Panel [[id number]]  (Abnormal) Collected: 06/07/24 0406    Order Status: Completed Specimen: Blood Updated: 06/07/24 1926     CTX-M (ESBL ) Not Detected     IMP (Cabapenemase ) Not Detected     KPC resistance gene (Carbapenemase ) Not Detected     mcr-1 Not Detected     mecA ID N/A     Comment: Note: Antimicrobial resistance can occur via multiple mechanisms. A Not Detected result for antimicrobial resistance gene(s) does not indicate antimicrobial susceptibility. Subculturing is required for species identification and susceptibility testing of   isolates.        mecA/C and MREJ (MRSA) gene N/A     NDM (Carbapenemase ) Not Detected     OXA-48-like (Carbapenemase ) Not Detected     Brice/B (VRE gene) N/A     VIM (Carbapenemase ) Not Detected     Enterococcus faecalis Not Detected     Enterococcus faecium Not Detected     Listeria monocytogenes Not Detected     Staphylococcus spp. Not Detected     Staphylococcus aureus Not Detected     Staphylococcus epidermidis Not Detected     Staphylococcus lugdunensis Not Detected     Streptococcus spp. Not Detected     Streptococcus agalactiae (Group B) Not Detected     Streptococcus pneumoniae Not Detected     Streptococcus pyogenes (Group A) Not Detected     Acinetobacter calcoaceticus/baumannii complex Not Detected     Bacteroides fragilis Not Detected     Enterobacterales Detected     Enterobacter cloacae complex Not Detected     Escherichia coli Not Detected     Klebsiella aerogenes Not Detected     Klebsiella oxytoca Not Detected     Klebsiella pneumoniae group Detected     Proteus spp. Not Detected      Salmonella spp. Not Detected     Serratia marcescens Not Detected     Haemophilus influenzae Not Detected     Neisseria meningitidis Not Detected     Pseudomonas aeruginosa Not Detected     Stenotrophomonas maltophilia Not Detected     Candida albicans Not Detected     Candida auris Not Detected     Candida glabrata Not Detected     Candida krusei Not Detected     Candida parapsilosis Not Detected     Candida tropicalis Not Detected     Cryptococcus neoformans/gattii Not Detected    Narrative:      The [employer] BCID2 Panel is a multiplexed nucleic acid test intended for the use with Mixgar® 2.0 or Mixgar® Access Mobile Systems for the simultaneous qualitative detection and identification of multiple bacterial and yeast nucleic acids and select genetic determinants associated with antimicrobial resistance.  The [employer] BCID2 Panel test is performed directly on blood culture samples identified as positive by a continuous monitoring blood culture system.  Results are intended to be interpreted in conjunction with Gram stain results.    Gram Stain [[id number]] Collected: 06/07/24 1324    Order Status: Completed Specimen: Tissue from Liver Updated: 06/07/24 1504     GRAM STAIN No WBCs, No bacteria seen    Fungal Culture [[id number]] Collected: 06/07/24 1324    Order Status: Sent Specimen: Tissue from Liver Updated: 06/07/24 1324             CT Abdomen Pelvis With IV Contrast NO Oral Contrast  Narrative: EXAMINATION:  CT ABDOMEN PELVIS WITH IV CONTRAST    CLINICAL HISTORY:  Abdominal abscess/infection suspected;liver abscess; Sepsis, unspecified organism    TECHNIQUE:  Low dose axial images, sagittal and coronal reformations were obtained from the lung bases to the pubic symphysis following the IV administration of contrast. Automatic exposure control (AEC) is utilized to reduce patient radiation exposure.    COMPARISON:  06/07/2024    FINDINGS:  There is bibasilar atelectasis.  There are small  bilateral pleural effusions.    There are multiple hypoattenuated liver lesions seen which was seen on the prior examination as well.  Liver lesion is seen in the dome of the liver on image 30 series 2.  It measures 4.3 x 3.7 cm.  This area measured 3 cm x 2.7 cm on the prior examination.  Another lesion is seen in the dome of the liver in segment 8 medially.  It measures 3.7 x 3.2 cm.  Previously this area measured 3.2 x 2.6 cm.  There is a large hypoattenuated heterogeneous area seen in the caudate that measures 6.7 x 7 cm.  Previously this area measured 5 cm x 5.3 cm.  These areas were shown to be liver abscesses on prior examination.  Findings are consistent with progression.    The gallbladder appears normal.  No obvious gallstones are seen.  No biliary dilatation is seen.  No pericholecystic fluid is seen.    The pancreas appears normal.  No pancreatic mass or lesion is seen.    The spleen shows no acute abnormality.    The adrenal glands appear normal.  No adrenal nodule is seen.    The kidneys appear normal.  No hydronephrosis is seen.  No hydroureter is seen.  No nephrolithiasis is seen.  No obvious ureteral stones are seen.    Urinary bladder appears grossly unremarkable.    No colitis is seen.  No diverticulitis is seen.  No obvious colonic mass or lesion is seen.  The appendix appears normal.    No free air is seen.  No free fluid is seen.  Impression: Enlarging liver lesions as outlined above.  These were shown to be likely liver abscesses on prior biopsy.  Findings are consistent with enlarging liver abscesses.    Interval development of bibasilar atelectasis and small effusions    Electronically signed by: Caleb Pena  Date:    06/10/2024  Time:    15:31         Medication List        START taking these medications      dextrose 5 % in water (D5W) PgBk 100 mL with ceFEPIme 2 gram SolR 2 g  Inject 2 g into the vein every 8 (eight) hours.     enoxaparin 40 mg/0.4 mL Syrg  Commonly known as:  LOVENOX  Inject 0.4 mLs (40 mg total) into the skin once daily.     metroNIDAZOLE 500 MG tablet  Commonly known as: FLAGYL  Take 1 tablet (500 mg total) by mouth every 8 (eight) hours.            CONTINUE taking these medications      omeprazole 40 MG capsule  Commonly known as: PRILOSEC     ondansetron 4 mg/5 mL solution  Commonly known as: ZOFRAN     sucralfate 1 gram tablet  Commonly known as: CARAFATE            STOP taking these medications      cephALEXin 500 MG capsule  Commonly known as: KEFLEX               Where to Get Your Medications        Information about where to get these medications is not yet available    Ask your nurse or doctor about these medications  dextrose 5 % in water (D5W) PgBk 100 mL with ceFEPIme 2 gram SolR 2 g  enoxaparin 40 mg/0.4 mL Syrg  metroNIDAZOLE 500 MG tablet          Explained in detail to the patient about the discharge plan, medications, and follow-up visits. Pt understands and agrees with the treatment plan  [facility]  Discharged Condition: stable  Diet-   Dietary Orders (From admission, onward)       Start     Ordered    06/10/24 1107  Diet Adult Regular  Diet effective now         06/10/24 1106                   Medications Per DC med rec  Activities as tolerated    For further questions contact hospitalist office    Discharge time 33 minutes    For worsening symptoms, chest pain, shortness of breath, increased abdominal pain, high grade fever, stroke or stroke like symptoms, immediately go to the nearest Emergency Room or call 911 as soon as possible.      Antonietta Gaming DO  Department of Hospital Medicine  [facility]  06/11/2024

## 2024-06-11 NOTE — CONSULTS
Largest abscess in the caudate has increased in size. No percutaneous access available. Recommend transfer for higher level of care with increasing size. Discussed with attending caring for the patient.

## 2024-06-11 NOTE — CONSULTS
"[facility]  ADMIT / CONSULT / H&P NOTE  Date: 6/11/2024    CC:   Liver abscess    SUBJECTIVE    HPI:   Nba Montoya is a 43 y.o. male presenting with 5 days of fever. Patient was seen at outside hospital and treated for possible UTI. Patient had biopsy of liver lesion which revealed klebsiella. He also complains of abdominal fullness. Denies any sick contacts, recent travel, or eating anything. Patient denies any nausea, vomiting, or PO intolerance.     ROS:  Negative other than HPI    PMH:   No past medical history on file.    PSH:   No past surgical history on file.    FamHx:   No family history on file.    SocHx:  Social History     Socioeconomic History    Marital status:        Allergies:   Review of patient's allergies indicates:  No Known Allergies    Medications:  No current facility-administered medications on file prior to encounter.     Current Outpatient Medications on File Prior to Encounter   Medication Sig Dispense Refill    omeprazole (PRILOSEC) 40 MG capsule Take 40 mg by mouth once daily.      ondansetron (ZOFRAN) 4 mg/5 mL solution Take 8 mg by mouth 3 (three) times daily as needed for Nausea.      sucralfate (CARAFATE) 1 gram tablet Take 1 g by mouth 4 (four) times daily.      [DISCONTINUED] cephALEXin (KEFLEX) 500 MG capsule Take 500 mg by mouth 4 (four) times daily. For 5 days         OBJECTIVE    VITAL SIGNS: 24 HR MIN & MAX LAST    Temp  Min: 98.3 °F (36.8 °C)  Max: 100.7 °F (38.2 °C)  98.3 °F (36.8 °C)        BP  Min: 132/80  Max: 184/115  (!) 154/91     Pulse  Min: 79  Max: 112  86     Resp  Min: 18  Max: 19  18    SpO2  Min: 95 %  Max: 97 %  97 %      HT: 5' 3" (160 cm)  WT: 83.9 kg (185 lb)  BMI: 32.8       Physical Exam:  General: NAD  HEENT: Anicteric sclera  Resp: Unlabored respirations  Cardiac: RRR  Abdomen: Soft, NT, ND  Extremities: Well perfused    Results  Recent Labs   Lab 06/07/24  0406 06/07/24  0934 06/08/24  0440 06/09/24  0831 06/10/24  0353 " 06/11/24  0450 06/11/24  0656   WBC 3.59*  --  4.3  4.30* 11.31  --   --  14.84*   HGB 14.1  --  13.0* 12.5*  --   --  12.7*   HCT 42.1  --  38.5* 37.5*  --   --  38.6*     --  115* 130  --   --  318   INR  --  1.1  --   --   --   --   --    *  --  135* 137 135* 137  --    CO2 16*  --  18* 20* 21* 24  --    BUN 9.6  --  12.1 10.5 8.2* 6.4*  --    CREATININE 0.86  --  0.88 0.69* 0.66* 0.68*  --    GLUCOSE 182*  --  163* 175* 129* 130*  --    CALCIUM 8.8  --  8.1* 8.0* 7.8* 8.6  --    ALKPHOS 197*  --  179* 166* 184* 302*  --        Imaging:  CT abdomen 6/10  3x liver lesions     A/P:   43 y.o. male with past medical history of HLD who presented to ED with fevers for about 1 week. Found to have multiple liver abscesses. IR consulted and does not see a window for percutaneous drainage. Surgery consulted for possible surgical intervention.     - Recommend transfer to [facility] main campus for evaluation by hepatobiliary team   - Agree with antibiotics   - Please call with any further questions or concerns     Rima Baez MD  [facility] PGY-1

## 2024-06-11 NOTE — PROGRESS NOTES
[facility]  Infectious Diseases Progress Note        ASSESSMENT & PLAN:     He is a 43-year-old male who presented to the emergency room with fever for 5 days as well as right upper quadrant abdominal pain.  He was seen in an urgent care several days ago and started on cephalexin for possible UTI, however fevers persisted.  He was noted to be febrile and tachycardic on admit.  CT showed multiple liver lesions concerning for necrotic mass versus abscess.  Patient was originally from the [facility] and relocated here in 2017.  He works as a  in [place].  Reports he returned home for 1 week in February.  Denies ingesting any raw foods, diarrhea, or significant vomiting although does report 1 episode of vomiting earlier this week.  He underwent ultrasound-guided liver biopsy today.  Admit blood cultures are positive for Klebsiella pneumoniae per BCID.  Currently on Zosyn.  We have been consulted for assistance.    ABX:  Vancomycin 6/7, 6/10-present  Zosyn 6/6-6/7  Cefepime 6/7-present  Flagyl 6/10-present      Klebsiella bacteremia    Liver abscess  Sepsis      PLAN:  IR recommending transfer to tertiary center.  Continue cefepime 2g IV q8h, Flagyl 500mg PO q8h, and vancomycin, dosing per pharmacy for trough goal 15-20.  Primary working on transfer.   Discussed with patient, family, and nursing.      SUBJECTIVE:     Feeling ok, asking to eat.  No events overnight.       MEDICATIONS:   Reviewed in EMR    REVIEW OF SYSTEMS:   Except as documented, all other systems reviewed and negative     PHYSICAL EXAM:   T 98.3 °F (36.8 °C)   /80   P 87   RR 19   O2 96 %  GENERAL: NAD; does not appear toxic  SKIN: no rash  HEENT: sclera non-icteric; PERRL   NECK: supple; no LAD  CHEST: CTA; nonlabored, equal expansion; no adventitious BS  CARDIOVASCULAR: RRR, S1S2; no murmur; strong, equal peripheral pulses; no edema  ABDOMEN:  active bowel sounds; abdomen soft, rounded, +RUQ TTP  EXTREMITIES: no cyanosis or  "clubbing  NEURO: AAO x4; CN II-XII grossly intact  PSYCH: Mentation and affect appropriate     LABS AND IMAGING:     Recent Labs     06/09/24  0831   WBC 11.31   RBC 4.20*   HGB 12.5*   HCT 37.5*   MCV 89.3   MCH 29.8   MCHC 33.3   RDW 14.8        No results for input(s): "LACTIC" in the last 72 hours.  No results for input(s): "INR", "APTT", "D-DIMER" in the last 72 hours.    No results for input(s): "HGBA1C", "CHOL", "TRIG", "LDL", "VLDL", "HDL" in the last 72 hours.   Recent Labs     06/10/24  0353 06/11/24  0450   * 137   K 3.9 3.6   CO2 21* 24   BUN 8.2* 6.4*   CREATININE 0.66* 0.68*   GLUCOSE 129* 130*   CALCIUM 7.8* 8.6   ALBUMIN 1.9* 2.1*   GLOBULIN 3.6* 4.7*   ALKPHOS 184* 302*   ALT 71* 93*   AST 45* 74*   BILITOT 1.8* 1.8*     No results for input(s): "BNP", "CPK", "TROPONINI" in the last 72 hours.         CT Abdomen Pelvis With IV Contrast NO Oral Contrast  Narrative: EXAMINATION:  CT ABDOMEN PELVIS WITH IV CONTRAST    CLINICAL HISTORY:  Abdominal abscess/infection suspected;liver abscess; Sepsis, unspecified organism    TECHNIQUE:  Low dose axial images, sagittal and coronal reformations were obtained from the lung bases to the pubic symphysis following the IV administration of contrast. Automatic exposure control (AEC) is utilized to reduce patient radiation exposure.    COMPARISON:  06/07/2024    FINDINGS:  There is bibasilar atelectasis.  There are small bilateral pleural effusions.    There are multiple hypoattenuated liver lesions seen which was seen on the prior examination as well.  Liver lesion is seen in the dome of the liver on image 30 series 2.  It measures 4.3 x 3.7 cm.  This area measured 3 cm x 2.7 cm on the prior examination.  Another lesion is seen in the dome of the liver in segment 8 medially.  It measures 3.7 x 3.2 cm.  Previously this area measured 3.2 x 2.6 cm.  There is a large hypoattenuated heterogeneous area seen in the caudate that measures 6.7 x 7 cm.  Previously " this area measured 5 cm x 5.3 cm.  These areas were shown to be liver abscesses on prior examination.  Findings are consistent with progression.    The gallbladder appears normal.  No obvious gallstones are seen.  No biliary dilatation is seen.  No pericholecystic fluid is seen.    The pancreas appears normal.  No pancreatic mass or lesion is seen.    The spleen shows no acute abnormality.    The adrenal glands appear normal.  No adrenal nodule is seen.    The kidneys appear normal.  No hydronephrosis is seen.  No hydroureter is seen.  No nephrolithiasis is seen.  No obvious ureteral stones are seen.    Urinary bladder appears grossly unremarkable.    No colitis is seen.  No diverticulitis is seen.  No obvious colonic mass or lesion is seen.  The appendix appears normal.    No free air is seen.  No free fluid is seen.  Impression: Enlarging liver lesions as outlined above.  These were shown to be likely liver abscesses on prior biopsy.  Findings are consistent with enlarging liver abscesses.    Interval development of bibasilar atelectasis and small effusions    Electronically signed by: Caleb Pena  Date:    06/10/2024  Time:    15:31      NEIDA Heck  Infectious Diseases

## 2024-06-11 NOTE — PROVIDER TRANSFER
(Physician in Lead of Transfers)  Outside Transfer Acceptance Note / Regional Referral Center      Upon patient arrival, please contact Hospital Medicine on call.    Referring facility: [facility]   Referring provider: GEORGIE HIDALGO  Accepting facility: [facility]  Accepting provider: BIA ARTEAGA  Admitting provider: ANCELMO RODRÍGUEZ  Reason for transfer:  Need Interventional Radiology  Transfer diagnosis: hepatic abscesses  Transfer specialty requested: Interventional Radiology  Transfer specialty notified: Yes  Transfer level: NUMBER 1-5: 2  Bed type requested: med-tele  Isolation status: No active isolations   Admission class or status: IP- Inpatient      Narrative     43-year-old male with a history of hyperlipidemia admitted to [facility] on June 7 with fever and abdominal pain.  CT imaging was concerning for abnormalities in the liver (possible mass versus abscess).  Radiology biopsied the liver (no malignant cells noted on pathology) on June 7.  Cultures from the biopsy are growing Klebsiella, and blood cultures are growing Klebsiella.  The Radiology procedure note mentions a drain being left in place, but the referring provider noted a drain was not left in place. He was seen by Infectious Diseases, and antibiotics were adjusted.  He continued to have fever, and his antibiotics were adjusted.  On repeat imaging, liver lesions appeared to be enlarging.  He was evaluated by Radiology at [facility], and it was felt that there was not percutaneous access available.  He remains on cefepime, metronidazole, and vancomycin.  Mentation is intact and he is hemodynamically stable.  Requesting transfer to [facility] for Interventional Radiology evaluation of drain placement into the hepatic abscess.  After discussion of the options of a, and go procedure versus transfer, the safest course appears to be transfer to Hospital Medicine  at [facility] for Interventional Radiology procedure on the liver abscesses.  If the patient tolerates the procedure well and is stable for a period of time after, could potentially transfer back to [facility] for further treatment.  Requesting transfer to Hospital Medicine at [facility] for potential interventional radiology procedure on June 12.  As far as IR drain management once the patient is transferred back, there will need to be a provider who can manage the drain and remove it at [facility] when it is no longer needed (Radiology or other provider).    June 11: Sodium 137, potassium 3.6, chloride 104, CO2 24, BUN 6.4, creatinine 0.68, glucose 130, calcium 8.6, albumin 2.1, AST 74, ALT 93, total bilirubin 1.8    Nazia 10: Blood cultures collected  -sodium 135, potassium 3.9, chloride 105, CO2 21, BUN 8.2, creatinine 0.66, glucose 129, total bilirubin 1.8, AST 45, ALT 71  -CT abdomen and pelvis with contrast showed enlarging liver lesions.  She had multiple liver lesions seen.  Findings are consistent with enlarging liver abscesses.  Interval development of bibasilar atelectasis and small effusions.    June 9: White blood cells 11.31, hemoglobin 12.5, hematocrit 37.5, platelets 130    June 8: Hepatitis-B and C serologies were nonreactive    June 7: Blood cultures with Klebsiella pneumoniae  -aerobic culture from liver tissue has Klebsiella pneumoniae  -COVID negative, influenza negative  -chest x-ray had no acute findings.  -CT abdomen and pelvis with IV contrast showed hypoattenuating liver lesions could relate to metastatic disease or infection.  There are 3 hypoattenuating liver lesions with the largest in the caudate measuring up to about 5 cm.  -CT-guided right hepatic abscess biopsy (no rain placed)      VS:  Temperature 98.3° (100.5 around 4:00 a.m.), heart rate 87, blood pressure 132/80, O2 sats 96%      Objective     Vitals: Temp: 98.3 °F (36.8 °C) (06/11/24 1109)  Pulse: 86  (06/11/24 1109)  Resp: 18 (06/11/24 1109)  BP: (!) 154/91 (06/11/24 1109)  SpO2: 97 % (06/11/24 1109)  Recent Labs: CBC:   Recent Labs   Lab 06/11/24  0656   WBC 14.84*   HGB 12.7*   HCT 38.6*        CMP:   Recent Labs   Lab 06/10/24  0353 06/11/24  0450   * 137   K 3.9 3.6    104   CO2 21* 24   BUN 8.2* 6.4*   CREATININE 0.66* 0.68*   CALCIUM 7.8* 8.6   ALBUMIN 1.9* 2.1*   BILITOT 1.8* 1.8*   ALKPHOS 184* 302*   AST 45* 74*   ALT 71* 93*         Instructions    Admit to Hospital Medicine  Consult Interventional Radiology      NICOLE Tejada MD  Hospital Medicine Staff  Cell: [phone]

## 2024-06-12 PROBLEM — R78.81 BACTEREMIA: Status: ACTIVE | Noted: 2024-06-12

## 2024-06-12 PROBLEM — E80.6 HYPERBILIRUBINEMIA: Status: ACTIVE | Noted: 2024-06-12

## 2024-06-12 PROBLEM — I10 HYPERTENSION: Status: ACTIVE | Noted: 2024-06-12

## 2024-06-12 PROBLEM — E66.09 CLASS 1 OBESITY DUE TO EXCESS CALORIES WITH BODY MASS INDEX (BMI) OF 33.0 TO 33.9 IN ADULT: Chronic | Status: ACTIVE | Noted: 2024-06-12

## 2024-06-12 PROBLEM — A41.9 SEPSIS: Status: ACTIVE | Noted: 2024-06-12

## 2024-06-12 PROBLEM — K21.9 GERD (GASTROESOPHAGEAL REFLUX DISEASE): Status: ACTIVE | Noted: 2024-06-12

## 2024-06-12 PROBLEM — K21.9 GERD (GASTROESOPHAGEAL REFLUX DISEASE): Chronic | Status: ACTIVE | Noted: 2024-06-12

## 2024-06-12 PROBLEM — E78.5 HLD (HYPERLIPIDEMIA): Chronic | Status: ACTIVE | Noted: 2024-06-12

## 2024-06-12 PROBLEM — R74.01 TRANSAMINITIS: Status: ACTIVE | Noted: 2024-06-12

## 2024-06-12 PROBLEM — E78.5 HLD (HYPERLIPIDEMIA): Status: ACTIVE | Noted: 2024-06-12

## 2024-06-12 PROBLEM — E66.09 CLASS 1 OBESITY DUE TO EXCESS CALORIES WITH BODY MASS INDEX (BMI) OF 33.0 TO 33.9 IN ADULT: Status: ACTIVE | Noted: 2024-06-12

## 2024-06-12 LAB
APPEARANCE FLD: NORMAL
BODY FLD TYPE: NORMAL
COLOR FLD: NORMAL
GRAM STN SPEC: NORMAL
GRAM STN SPEC: NORMAL
LYMPHOCYTES NFR FLD MANUAL: 5 %
MONOS+MACROS NFR FLD MANUAL: 14 %
NEUTROPHILS NFR FLD MANUAL: 81 %
WBC # FLD: NORMAL /CU MM

## 2024-06-12 PROCEDURE — 25000003 PHARM REV CODE 250: Performed by: NURSE PRACTITIONER

## 2024-06-12 PROCEDURE — 99223 1ST HOSP IP/OBS HIGH 75: CPT | Mod: NSCH,,, | Performed by: INTERNAL MEDICINE

## 2024-06-12 PROCEDURE — 87077 CULTURE AEROBIC IDENTIFY: CPT | Performed by: FAMILY MEDICINE

## 2024-06-12 PROCEDURE — 94761 N-INVAS EAR/PLS OXIMETRY MLT: CPT

## 2024-06-12 PROCEDURE — 63600175 PHARM REV CODE 636 W HCPCS: Performed by: RADIOLOGY

## 2024-06-12 PROCEDURE — 89051 BODY FLUID CELL COUNT: CPT | Performed by: NURSE PRACTITIONER

## 2024-06-12 PROCEDURE — 63600175 PHARM REV CODE 636 W HCPCS: Performed by: HOSPITALIST

## 2024-06-12 PROCEDURE — 87070 CULTURE OTHR SPECIMN AEROBIC: CPT | Performed by: FAMILY MEDICINE

## 2024-06-12 PROCEDURE — 36415 COLL VENOUS BLD VENIPUNCTURE: CPT | Performed by: INTERNAL MEDICINE

## 2024-06-12 PROCEDURE — 25000003 PHARM REV CODE 250: Performed by: INTERNAL MEDICINE

## 2024-06-12 PROCEDURE — 88112 CYTOPATH CELL ENHANCE TECH: CPT | Performed by: PATHOLOGY

## 2024-06-12 PROCEDURE — 87205 SMEAR GRAM STAIN: CPT | Performed by: FAMILY MEDICINE

## 2024-06-12 PROCEDURE — 87186 SC STD MICRODIL/AGAR DIL: CPT | Performed by: FAMILY MEDICINE

## 2024-06-12 PROCEDURE — 94799 UNLISTED PULMONARY SVC/PX: CPT

## 2024-06-12 PROCEDURE — 25000003 PHARM REV CODE 250: Performed by: HOSPITALIST

## 2024-06-12 PROCEDURE — 80202 ASSAY OF VANCOMYCIN: CPT | Performed by: INTERNAL MEDICINE

## 2024-06-12 PROCEDURE — 87075 CULTR BACTERIA EXCEPT BLOOD: CPT | Performed by: FAMILY MEDICINE

## 2024-06-12 PROCEDURE — 63600175 PHARM REV CODE 636 W HCPCS: Performed by: INTERNAL MEDICINE

## 2024-06-12 PROCEDURE — 0F9030Z DRAINAGE OF LIVER WITH DRAINAGE DEVICE, PERCUTANEOUS APPROACH: ICD-10-PCS | Performed by: RADIOLOGY

## 2024-06-12 PROCEDURE — 88112 CYTOPATH CELL ENHANCE TECH: CPT | Mod: 26,,, | Performed by: PATHOLOGY

## 2024-06-12 PROCEDURE — G0427 INPT/ED TELECONSULT70: HCPCS | Mod: GT,,, | Performed by: INTERNAL MEDICINE

## 2024-06-12 PROCEDURE — 63600175 PHARM REV CODE 636 W HCPCS: Performed by: NURSE PRACTITIONER

## 2024-06-12 PROCEDURE — 99900035 HC TECH TIME PER 15 MIN (STAT)

## 2024-06-12 PROCEDURE — 21400001 HC TELEMETRY ROOM

## 2024-06-12 RX ORDER — METRONIDAZOLE 500 MG/1
500 TABLET ORAL EVERY 8 HOURS
Status: DISCONTINUED | OUTPATIENT
Start: 2024-06-12 | End: 2024-06-13

## 2024-06-12 RX ORDER — FENTANYL CITRATE 50 UG/ML
INJECTION, SOLUTION INTRAMUSCULAR; INTRAVENOUS CODE/TRAUMA/SEDATION MEDICATION
Status: COMPLETED | OUTPATIENT
Start: 2024-06-12 | End: 2024-06-12

## 2024-06-12 RX ADMIN — PROMETHAZINE HYDROCHLORIDE 25 MG: 25 TABLET ORAL at 09:06

## 2024-06-12 RX ADMIN — CEFEPIME 2 G: 2 INJECTION, POWDER, FOR SOLUTION INTRAVENOUS at 12:06

## 2024-06-12 RX ADMIN — ALUMINUM HYDROXIDE, MAGNESIUM HYDROXIDE, AND SIMETHICONE 30 ML: 1200; 120; 1200 SUSPENSION ORAL at 09:06

## 2024-06-12 RX ADMIN — ACETAMINOPHEN 650 MG: 325 TABLET ORAL at 12:06

## 2024-06-12 RX ADMIN — ACETAMINOPHEN 650 MG: 325 TABLET ORAL at 06:06

## 2024-06-12 RX ADMIN — ALUMINUM HYDROXIDE, MAGNESIUM HYDROXIDE, AND SIMETHICONE 30 ML: 1200; 120; 1200 SUSPENSION ORAL at 06:06

## 2024-06-12 RX ADMIN — CEFEPIME 2 G: 2 INJECTION, POWDER, FOR SOLUTION INTRAVENOUS at 09:06

## 2024-06-12 RX ADMIN — METRONIDAZOLE 500 MG: 500 TABLET ORAL at 05:06

## 2024-06-12 RX ADMIN — HYDROCODONE BITARTRATE AND ACETAMINOPHEN 1 TABLET: 5; 325 TABLET ORAL at 07:06

## 2024-06-12 RX ADMIN — VANCOMYCIN HYDROCHLORIDE 1500 MG: 1.5 INJECTION, POWDER, LYOPHILIZED, FOR SOLUTION INTRAVENOUS at 01:06

## 2024-06-12 RX ADMIN — METRONIDAZOLE 500 MG: 500 TABLET ORAL at 09:06

## 2024-06-12 RX ADMIN — FENTANYL CITRATE 50 MCG: 0.05 INJECTION, SOLUTION INTRAMUSCULAR; INTRAVENOUS at 02:06

## 2024-06-12 RX ADMIN — CEFEPIME 2 G: 2 INJECTION, POWDER, FOR SOLUTION INTRAVENOUS at 04:06

## 2024-06-12 RX ADMIN — MORPHINE SULFATE 2 MG: 2 INJECTION, SOLUTION INTRAMUSCULAR; INTRAVENOUS at 03:06

## 2024-06-12 RX ADMIN — METRONIDAZOLE 500 MG: 500 TABLET ORAL at 12:06

## 2024-06-12 RX ADMIN — ACETAMINOPHEN 650 MG: 325 TABLET ORAL at 09:06

## 2024-06-12 RX ADMIN — ONDANSETRON 4 MG: 2 INJECTION INTRAMUSCULAR; INTRAVENOUS at 06:06

## 2024-06-12 NOTE — OP NOTE
Pre Op Diagnosis: intrahepatic abscess     Post Op Diagnosis: same     Procedure:  drain     Procedure performed by: Chip DUNN, Madiha EMERSON     Written Informed Consent Obtained: Yes     Specimen Removed:  yes     Estimated Blood Loss:  minimal     Findings: Local anesthesia     Sedation:  yes     The patient tolerated the procedure well and there were no complications.      Disposition:  F/U in clinic or with ordering physician    Discharge instructions:  Light activity for 24 hours.  Remove band aid in 24 hours.  No baths (showers are appropriate).      Sterile technique was performed in the RUQ lateral abdomen area, lidocaine was used as a local anesthetic.  8.5 fr drainage catheter placed and approx 45 ccs of purulent fluid removed and sent to lab.  Catheter secured with suture and dressing.  Pt tolerated the procedure well without immediate complications.  Please see radiologist report for details. F/u with PCP and/or ordering physician.

## 2024-06-12 NOTE — ASSESSMENT & PLAN NOTE
Blood cultures 6/7/24 obtained at outside facility positive for Klebsiella.   Repeat blood cultures 6/10/24 show NGTD  Patient followed by infectious disease and currently on IV cefepime, vancomycin, and Flagyl.  Plan:  -continue antibiotics  -f/u repeat cultures  Consult ID

## 2024-06-12 NOTE — PLAN OF CARE
[facility] - Telemetry (Hospital)  Initial Discharge Assessment       Primary Care Provider: Jes, Primary Doctor    Admission Diagnosis: Hepatic abscess [K75.0]    Admission Date: 6/11/2024  Expected Discharge Date:     Transition of Care Barriers: None    Payor: [employer] [facility] / Plan: BCBS ALL OUT OF STATE / Product Type: PPO /     Extended Emergency Contact Information  Primary Emergency Contact: Aixa Montoya  Mobile Phone: [phone]  Relation: Spouse  Preferred language: English   needed? No    Discharge Plan A: Home  Discharge Plan B: Home    No Pharmacies Listed    Initial Assessment (most recent)       Adult Discharge Assessment - 06/12/24 0923          Discharge Assessment    Assessment Type Discharge Planning Assessment     Confirmed/corrected address, phone number and insurance Yes     Confirmed Demographics Correct on Facesheet     Source of Information patient;family     Communicated CORY with patient/caregiver Date not available/Unable to determine     Reason For Admission Liver abscess     People in Home spouse     Facility Arrived From: Transferred from [facility]     Do you expect to return to your current living situation? Yes     Do you have help at home or someone to help you manage your care at home? Yes     Who are your caregiver(s) and their phone number(s)? Spouse     Prior to hospitilization cognitive status: Alert/Oriented     Current cognitive status: Alert/Oriented     Walking or Climbing Stairs Difficulty no     Dressing/Bathing Difficulty no     Equipment Currently Used at Home none     Readmission within 30 days? No     Patient currently being followed by outpatient case management? No     Do you currently have service(s) that help you manage your care at home? No     Do you take prescription medications? Yes     Do you have prescription coverage? Yes     Do you have any problems affording any of your prescribed medications? TBD     Is the patient taking  medications as prescribed? yes     Who is going to help you get home at discharge? Pt will arrange     How do you get to doctors appointments? car, drives self     Are you on dialysis? No     Do you take coumadin? No     Discharge Plan A Home     Discharge Plan B Home     DME Needed Upon Discharge  none     Discharge Plan discussed with: Patient     Transition of Care Barriers None                   SW met with patient and spouse at bedside to complete discharge assessment. Pt reports being transferred from [facility]. Pt expressed anxiety regarding abscess and wanting to avoid complications. Pt reports living at home with spouse and is normally independent with care at home. Pt has transportation for discharge.   Pt's whiteboard updated with CM contact and anticipated discharge disposition. SW to remain available as needed.

## 2024-06-12 NOTE — SEDATION DOCUMENTATION
6 F DRAIN PLACED ON URQ 45CC OF PURULENT FLUID DRAINED. DRAIN IS ATTACHED TO A BULB SUCTION. DRESSED WITH GAUZE AND TEGADERM. SUTURED IN PLACE.

## 2024-06-12 NOTE — INTERVAL H&P NOTE
The patient has been examined and the H&P has been reviewed:    I concur with the findings and no changes have occurred since H&P was written.        Active Hospital Problems    Diagnosis  POA    *Liver abscess [K75.0]  Yes    HLD (hyperlipidemia) [E78.5]  Yes     Chronic    GERD (gastroesophageal reflux disease) [K21.9]  Yes     Chronic    Class 1 obesity due to excess calories with body mass index (BMI) of 33.0 to 33.9 in adult [E66.09, Z68.33]  Not Applicable     Chronic    Bacteremia [R78.81]  Yes    Transaminitis [R74.01]  Yes    Hyperbilirubinemia [E80.6]  Yes    Hypertension [I10]  Yes    Sepsis [A41.9]  Yes      Resolved Hospital Problems   No resolved problems to display.

## 2024-06-12 NOTE — CONSULTS
Chart reviewed by Dr. Saunders.       ASSESSMENT/PLAN:    Liver abscess    The order for a ct drain has been placed and the procedure will be performed asap.          Thank you for the consult.

## 2024-06-12 NOTE — ASSESSMENT & PLAN NOTE
Body mass index is 33.59 kg/m². Morbid obesity complicates all aspects of disease management from diagnostic modalities to treatment. Weight loss encouraged and health benefits explained to patient.

## 2024-06-12 NOTE — ASSESSMENT & PLAN NOTE
"This patient does have evidence of infective focus  My overall impression is sepsis.  Source:  bacteremia and Liver abscess   Antibiotics given-   Antibiotics (72h ago, onward)      Start     Stop Route Frequency Ordered    06/12/24 0030  metroNIDAZOLE tablet 500 mg         -- Oral Every 8 hours 06/12/24 0018    06/12/24 0018  ceFEPIme (MAXIPIME) 2 g in dextrose 5 % in water (D5W) 100 mL IVPB (MB+)         -- IV Every 8 hours (non-standard times) 06/12/24 0018          Latest lactate reviewed-  No results for input(s): "LACTATE", "POCLAC" in the last 72 hours.  Organ dysfunction indicated by Acute liver injury    Fluid challenge Not needed - patient is not hypotensive      Post- resuscitation assessment No - Post resuscitation assessment not needed       Will Not start Pressors- Levophed for MAP of 65  Source control achieved by: IV Cefepime, vanco, and Flagyl  "

## 2024-06-12 NOTE — H&P
[facility] Medicine  History & Physical    Patient Name: Nba Montoya  MRN: [id number]  Patient Class: IP- Inpatient  Admission Date: 6/11/2024  Attending Physician: Dawit Patel MD   Primary Care Provider: Jes Primary Doctor         Patient information was obtained from patient, past medical records, and ER records.     Subjective:     Principal Problem:Liver abscess    Chief Complaint: No chief complaint on file.       HPI: Nba Montoya is a 43 y.o. male with a PMH  has no past medical history on file. who presented as a transfer from [facility] for higher level of care and further evaluation by IR and hepatology after patient was found to have presence of liver abscesses on CT.  Cultures obtained positive for Enterobacter and Klebsiella inpatient initiated on cefepime, vancomycin, and metronidazole per ID recommendations.  At time of bedside assessment, patient lying in bed in no acute distress only complaining of mild abdominal discomfort but otherwise reports feeling well.  Patient resumed on antibiotics and currently awaiting further evaluation/recommendations from IR and hepatology.  Additional history as noted below at time of transfer from  physician.      43-year-old male with a history of hyperlipidemia admitted to [facility] on June 7 with fever and abdominal pain.  CT imaging was concerning for abnormalities in the liver (possible mass versus abscess).  Radiology biopsied the liver (no malignant cells noted on pathology) on June 7.  Cultures from the biopsy are growing Klebsiella, and blood cultures are growing Klebsiella.  The Radiology procedure note mentions a drain being left in place, but the referring provider noted a drain was not left in place. He was seen by Infectious Diseases, and antibiotics were adjusted.  He continued to have fever, and his antibiotics were adjusted.  On repeat imaging, liver lesions appeared to  be enlarging.  He was evaluated by Radiology at [facility], and it was felt that there was not percutaneous access available.  He remains on cefepime, metronidazole, and vancomycin.  Mentation is intact and he is hemodynamically stable.  Requesting transfer to [facility] for Interventional Radiology evaluation of drain placement into the hepatic abscess.  After discussion of the options of a, and go procedure versus transfer, the safest course appears to be transfer to Hospital Medicine at [facility] for Interventional Radiology procedure on the liver abscesses.  If the patient tolerates the procedure well and is stable for a period of time after, could potentially transfer back to [facility] for further treatment.  Requesting transfer to Hospital Medicine at [facility] for potential interventional radiology procedure on June 12.  As far as IR drain management once the patient is transferred back, there will need to be a provider who can manage the drain and remove it at [facility] when it is no longer needed (Radiology or other provider).     June 11: Sodium 137, potassium 3.6, chloride 104, CO2 24, BUN 6.4, creatinine 0.68, glucose 130, calcium 8.6, albumin 2.1, AST 74, ALT 93, total bilirubin 1.8     Nazia 10: Blood cultures collected  -sodium 135, potassium 3.9, chloride 105, CO2 21, BUN 8.2, creatinine 0.66, glucose 129, total bilirubin 1.8, AST 45, ALT 71  -CT abdomen and pelvis with contrast showed enlarging liver lesions.  She had multiple liver lesions seen.  Findings are consistent with enlarging liver abscesses.  Interval development of bibasilar atelectasis and small effusions.     June 9: White blood cells 11.31, hemoglobin 12.5, hematocrit 37.5, platelets 130     June 8: Hepatitis-B and C serologies were nonreactive     June 7: Blood cultures with Klebsiella pneumoniae  -aerobic culture from liver tissue has Klebsiella pneumoniae  -COVID negative, influenza  negative  -chest x-ray had no acute findings.  -CT abdomen and pelvis with IV contrast showed hypoattenuating liver lesions could relate to metastatic disease or infection.  There are 3 hypoattenuating liver lesions with the largest in the caudate measuring up to about 5 cm.  -CT-guided right hepatic abscess biopsy (no rain placed)    PCP: No, Primary Doctor      No past medical history on file.    No past surgical history on file.    Review of patient's allergies indicates:  No Known Allergies    Current Facility-Administered Medications on File Prior to Encounter   Medication    [DISCONTINUED] acetaminophen tablet 650 mg    [DISCONTINUED] ceFEPIme (MAXIPIME) 2 g in dextrose 5 % in water (D5W) 100 mL IVPB (MB+)    [DISCONTINUED] dextrose 10% bolus 125 mL 125 mL    [DISCONTINUED] dextrose 10% bolus 250 mL 250 mL    [DISCONTINUED] enoxaparin injection 40 mg    [DISCONTINUED] glucagon (human recombinant) injection 1 mg    [DISCONTINUED] glucose chewable tablet 16 g    [DISCONTINUED] glucose chewable tablet 24 g    [DISCONTINUED] hydrALAZINE injection 10 mg    [DISCONTINUED] ibuprofen tablet 400 mg    [DISCONTINUED] lactated ringers infusion    [DISCONTINUED] metroNIDAZOLE tablet 500 mg    [DISCONTINUED] sodium chloride 0.9% flush 10 mL    [DISCONTINUED] vancomycin - pharmacy to dose    [DISCONTINUED] vancomycin 1.5 g in dextrose 5 % 250 mL IVPB (ready to mix)     Current Outpatient Medications on File Prior to Encounter   Medication Sig    dextrose 5 % in water (D5W) PgBk 100 mL with ceFEPIme 2 gram SolR 2 g Inject 2 g into the vein every 8 (eight) hours.    enoxaparin (LOVENOX) 40 mg/0.4 mL Syrg Inject 0.4 mLs (40 mg total) into the skin once daily.    metroNIDAZOLE (FLAGYL) 500 MG tablet Take 1 tablet (500 mg total) by mouth every 8 (eight) hours.    omeprazole (PRILOSEC) 40 MG capsule Take 40 mg by mouth once daily.    ondansetron (ZOFRAN) 4 mg/5 mL solution Take 8 mg by mouth 3 (three) times daily as needed for  Nausea.    sucralfate (CARAFATE) 1 gram tablet Take 1 g by mouth 4 (four) times daily.     Family History    None       Tobacco Use    Smoking status: Not on file    Smokeless tobacco: Not on file   Substance and Sexual Activity    Alcohol use: Not on file    Drug use: Not on file    Sexual activity: Not on file     Review of Systems   All other systems reviewed and are negative.    Objective:     Vital Signs (Most Recent):  Temp: (!) 101 °F (38.3 °C) (06/11/24 2356)  Pulse: 95 (06/11/24 2356)  Resp: 20 (06/11/24 2356)  BP: (!) 186/101 (06/11/24 2356)  SpO2: 98 % (06/11/24 2356) Vital Signs (24h Range):  Temp:  [98.3 °F (36.8 °C)-101 °F (38.3 °C)] 101 °F (38.3 °C)  Pulse:  [] 95  Resp:  [18-20] 20  SpO2:  [96 %-99 %] 98 %  BP: (132-186)/() 186/101     Weight: 86 kg (189 lb 9.5 oz)  Body mass index is 33.59 kg/m².     Physical Exam  Vitals reviewed.   Constitutional:       General: He is not in acute distress.     Appearance: Normal appearance. He is obese. He is not ill-appearing, toxic-appearing or diaphoretic.   HENT:      Head: Normocephalic and atraumatic.      Right Ear: External ear normal.      Left Ear: External ear normal.      Nose: Nose normal. No congestion or rhinorrhea.      Mouth/Throat:      Mouth: Mucous membranes are moist.      Pharynx: Oropharynx is clear. No oropharyngeal exudate or posterior oropharyngeal erythema.   Eyes:      General: No scleral icterus.     Extraocular Movements: Extraocular movements intact.      Conjunctiva/sclera: Conjunctivae normal.      Pupils: Pupils are equal, round, and reactive to light.   Neck:      Vascular: No carotid bruit.   Cardiovascular:      Rate and Rhythm: Normal rate and regular rhythm.      Pulses: Normal pulses.      Heart sounds: Normal heart sounds. No murmur heard.     No friction rub. No gallop.   Pulmonary:      Effort: Pulmonary effort is normal. No respiratory distress.      Breath sounds: Normal breath sounds. No stridor. No  wheezing, rhonchi or rales.   Chest:      Chest wall: No tenderness.   Abdominal:      General: Abdomen is flat. There is no distension.      Palpations: Abdomen is soft. There is no mass.      Tenderness: There is abdominal tenderness. There is no guarding or rebound.      Hernia: No hernia is present.      Comments: Hyperactive bowel sounds noted throughout.  Mild TTP throughout epigastric and right upper quadrant region without evidence of guarding or rebound tenderness noted.   Musculoskeletal:         General: No swelling, tenderness, deformity or signs of injury. Normal range of motion.      Cervical back: Normal range of motion and neck supple. No rigidity or tenderness.   Lymphadenopathy:      Cervical: No cervical adenopathy.   Skin:     General: Skin is warm and dry.      Capillary Refill: Capillary refill takes less than 2 seconds.      Coloration: Skin is not jaundiced or pale.      Findings: No bruising, erythema, lesion or rash.   Neurological:      General: No focal deficit present.      Mental Status: He is alert and oriented to person, place, and time. Mental status is at baseline.      Cranial Nerves: No cranial nerve deficit.      Sensory: No sensory deficit.      Motor: No weakness.      Coordination: Coordination normal.   Psychiatric:         Mood and Affect: Mood normal.         Behavior: Behavior normal.         Thought Content: Thought content normal.         Judgment: Judgment normal.              CRANIAL NERVES     CN III, IV, VI   Pupils are equal, round, and reactive to light.       Significant Labs: All pertinent labs within the past 24 hours have been reviewed.    Significant Imaging: I have reviewed all pertinent imaging results/findings within the past 24 hours.    LABS:  Recent Results (from the past 24 hour(s))   Comprehensive Metabolic Panel    Collection Time: 06/11/24  4:50 AM   Result Value Ref Range    Sodium 137 136 - 145 mmol/L    Potassium 3.6 3.5 - 5.1 mmol/L    Chloride  104 98 - 107 mmol/L    CO2 24 22 - 29 mmol/L    Glucose 130 (H) 74 - 100 mg/dL    Blood Urea Nitrogen 6.4 (L) 8.9 - 20.6 mg/dL    Creatinine 0.68 (L) 0.73 - 1.18 mg/dL    Calcium 8.6 8.4 - 10.2 mg/dL    Protein Total 6.8 6.4 - 8.3 gm/dL    Albumin 2.1 (L) 3.5 - 5.0 g/dL    Globulin 4.7 (H) 2.4 - 3.5 gm/dL    Albumin/Globulin Ratio 0.4 (L) 1.1 - 2.0 ratio    Bilirubin Total 1.8 (H) <=1.5 mg/dL     (H) 40 - 150 unit/L    ALT 93 (H) 0 - 55 unit/L    AST 74 (H) 5 - 34 unit/L    eGFR >60 mL/min/1.73/m2    Anion Gap 9.0 mEq/L    BUN/Creatinine Ratio 9    CBC with Differential    Collection Time: 06/11/24  6:56 AM   Result Value Ref Range    WBC 14.84 (H) 4.50 - 11.50 x10(3)/mcL    RBC 4.34 (L) 4.70 - 6.10 x10(6)/mcL    Hgb 12.7 (L) 14.0 - 18.0 g/dL    Hct 38.6 (L) 42.0 - 52.0 %    MCV 88.9 80.0 - 94.0 fL    MCH 29.3 27.0 - 31.0 pg    MCHC 32.9 (L) 33.0 - 36.0 g/dL    RDW 14.6 11.5 - 17.0 %    Platelet 318 130 - 400 x10(3)/mcL    MPV 11.2 (H) 7.4 - 10.4 fL    Neut % 82.2 %    Lymph % 10.4 %    Mono % 5.5 %    Eos % 0.2 %    Basophil % 0.4 %    Lymph # 1.55 0.6 - 4.6 x10(3)/mcL    Neut # 12.19 (H) 2.1 - 9.2 x10(3)/mcL    Mono # 0.81 0.1 - 1.3 x10(3)/mcL    Eos # 0.03 0 - 0.9 x10(3)/mcL    Baso # 0.06 <=0.2 x10(3)/mcL    IG# 0.20 (H) 0 - 0.04 x10(3)/mcL    IG% 1.3 %    NRBC% 0.0 %       RADIOLOGY  CT Abdomen Pelvis With IV Contrast NO Oral Contrast    Result Date: 6/10/2024  EXAMINATION: CT ABDOMEN PELVIS WITH IV CONTRAST CLINICAL HISTORY: Abdominal abscess/infection suspected;liver abscess; Sepsis, unspecified organism TECHNIQUE: Low dose axial images, sagittal and coronal reformations were obtained from the lung bases to the pubic symphysis following the IV administration of contrast. Automatic exposure control (AEC) is utilized to reduce patient radiation exposure. COMPARISON: 06/07/2024 FINDINGS: There is bibasilar atelectasis.  There are small bilateral pleural effusions. There are multiple hypoattenuated liver  lesions seen which was seen on the prior examination as well.  Liver lesion is seen in the dome of the liver on image 30 series 2.  It measures 4.3 x 3.7 cm.  This area measured 3 cm x 2.7 cm on the prior examination.  Another lesion is seen in the dome of the liver in segment 8 medially.  It measures 3.7 x 3.2 cm.  Previously this area measured 3.2 x 2.6 cm.  There is a large hypoattenuated heterogeneous area seen in the caudate that measures 6.7 x 7 cm.  Previously this area measured 5 cm x 5.3 cm.  These areas were shown to be liver abscesses on prior examination.  Findings are consistent with progression. The gallbladder appears normal.  No obvious gallstones are seen.  No biliary dilatation is seen.  No pericholecystic fluid is seen. The pancreas appears normal.  No pancreatic mass or lesion is seen. The spleen shows no acute abnormality. The adrenal glands appear normal.  No adrenal nodule is seen. The kidneys appear normal.  No hydronephrosis is seen.  No hydroureter is seen.  No nephrolithiasis is seen.  No obvious ureteral stones are seen. Urinary bladder appears grossly unremarkable. No colitis is seen.  No diverticulitis is seen.  No obvious colonic mass or lesion is seen.  The appendix appears normal. No free air is seen.  No free fluid is seen.     Enlarging liver lesions as outlined above.  These were shown to be likely liver abscesses on prior biopsy.  Findings are consistent with enlarging liver abscesses. Interval development of bibasilar atelectasis and small effusions Electronically signed by: Caleb Pena Date:    06/10/2024 Time:    15:31    CT Biopsy Liver (xpd)    Addendum Date: 6/7/2024    The above report is 4 drainage.  Correct report below. Examination: IR biopsy Clinical history: Hepatic lesions which may be infectious.  Malignancy not excluded Technique: CT-guided biopsy of right hepatic lesion. DLP: 418 Comparison: CT from earlier. Findings: The risk and benefits were explained to the  patient expressed understanding.  The soft tissues were anesthetized with a lidocaine solution.  Under CT guidance a needle was advanced to the right hepatic lesion.  Two biopsies were obtained.  The pathologist stated predominantly neutrophils.  The second biopsy was sent to microbiology for culture. Impression: Successful biopsy. Electronically signed by: Kvng Yu Date:    06/07/2024 Time:    14:59    Result Date: 6/7/2024  EXAMINATION: CT BIOPSY LIVER (XPD) CLINICAL HISTORY: liver lesion/infectious process; ANESTHESIA: Lidocaine was utilized for local anesthesia. TECHNIQUE: CT guided drainage of a right hepatic collection. DLP: 418 COMPARISON: No relevant prior available for comparison. FINDINGS: The risks, benefits, and potential complications were discussed including bleeding, infection, end organ damage. Informed consent was obtained. The patient was positioned in the left lateral decubitus position. The right hepatic abscess was localized CT.  The skin was marked and the area was prepped and draped in a sterile fashion. The site was anesthetized with 1% lidocaine solution. A 19 gauge, 5 cm coaxial needle was advanced into the collection, and a guidewire was then placed through the needle. The track was then dilated and a 12 Maldivian drain was placed. The drain was sutured to the skin and attached to a KARINA bulb. There was 120 cc of pus fluid drained. The patient tolerated the procedure without difficulty. Post-procedure imaging demonstrated a well-positioned catheter.  There were no immediate post-procedure complications. The patient was discharged to his room in stable condition.     Successful placement of a 12 Maldivian drain into the right hepatic abscess.  Samples were collected and sent to the lab for analysis. Electronically signed by: Kvng Yu Date:    06/07/2024 Time:    14:54    CT Abdomen Pelvis With IV Contrast NO Oral Contrast    Result Date: 6/7/2024  EXAMINATION: CT ABDOMEN PELVIS WITH IV  CONTRAST CLINICAL HISTORY: RUQ pain; TECHNIQUE: Helical acquisition through the abdomen and pelvis with IV contrast.  Three plane reconstructions were provided for review.  mGycm. Automatic exposure control, adjustment of mA/kV or iterative reconstruction technique was used to reduce radiation. COMPARISON: No prior FINDINGS: There is bibasilar atelectasis or scarring. There are 3 hypoattenuating liver lesions.  The largest is in the caudate measuring up to about 5 cm.  Patent portal vein. No significant abnormality of the gallbladder, spleen, pancreas or adrenals.  No hydronephrosis or suspicious renal lesion. No bowel obstruction.  Normal appendix.  Some rectal wall thickening is favored related to under distension.  No significantly enlarged mesenteric lymph nodes. Urinary bladder not well distended.  No pelvic free fluid.  Abdominal aorta normal in caliber. There are no acute osseous findings.     Hypoattenuating liver lesions could relate to metastatic disease or infection. Electronically signed by: Ivan Mckeon Date:    06/07/2024 Time:    11:43    X-Ray Chest AP Portable    Result Date: 6/7/2024  EXAMINATION: XR CHEST AP PORTABLE CLINICAL HISTORY: Fever, unspecified COMPARISON: No priors FINDINGS: Frontal view of the chest was obtained. The heart is not enlarged.  Lungs are grossly clear.  There is no pneumothorax or significant effusion.     No acute findings. Electronically signed by: Ivan Mckeon Date:    06/07/2024 Time:    06:00      EKG    MICROBIOLOGY    [facility]    Assessment/Plan:     * Liver abscess    Hyperbilirubinemia    Transaminitis  Patient found to have liver abscess on CT abdomen and currently on cefepime, vancomycin, and Flagyl.  Cultures obtained positive for Klebsiella and Enterobacter and followed by ID at outside facility.  T bili currently 1.8, AST/ALT 74/93, alk phosphatase 302, hepatitis panel pain negative.  Patient remains afebrile with T-max 101.0 F with a associated leukocytosis  "measuring 14.84.  IR and hepatology consulted and awaiting further evaluation/recommendations.  Plan:  -NPO  -Continue current pain regimen, titrate as needed  -Bowel regimen  -Bedrest  -IVFs prn  -Antiemetics prn  -Tylenol as needed for fever   -Continue antibiotics   -f/u cultures  -f/u IR and hepatology      Bacteremia  Cultures obtained at outside facility positive for Enterobacter and Klebsiella.  Patient followed by infectious disease and currently on cefepime, vancomycin, and Flagyl.  Plan:  -telemetry  -continue IVFs  -tylenol and antiemetics prn   -continue antibiotics  -f/u repeat cultures  -f/u ideas directed      Hypertension  Chronic, uncontrolled. Latest blood pressure and vitals reviewed-     Temp:  [98.3 °F (36.8 °C)-101 °F (38.3 °C)]   Pulse:  []   Resp:  [18-20]   BP: (132-186)/()   SpO2:  [96 %-99 %] .   Home meds for hypertension were reviewed and noted below.     While in the hospital, will manage blood pressure as follows;    Will utilize p.r.n. blood pressure medication only if patient's blood pressure greater than 160/100 and he develops symptoms such as worsening chest pain or shortness of breath.      HLD (hyperlipidemia)  Patient is chronically on statin.will not continue for now. Last Lipid Panel: No results found for: "CHOL", "HDL", "LDLCALC", "TRIG", "CHOLHDL"  Plan:  -Continue home medication  -low fat/low calorie diet      GERD (gastroesophageal reflux disease)  Chronic. Stable. Currently asymptomatic. Home medications include PPI/Antacids as needed.  Plan:  -Continue PPI/Antacids as needed       Class 1 obesity due to excess calories with body mass index (BMI) of 33.0 to 33.9 in adult  Body mass index is 33.59 kg/m². Morbid obesity complicates all aspects of disease management from diagnostic modalities to treatment. Weight loss encouraged and health benefits explained to patient.         VTE Risk Mitigation (From admission, onward)           Ordered     Reason for No " Pharmacological VTE Prophylaxis  Once        Comments: Planned surgical procedure   Question:  Reasons:  Answer:  Physician Provided (leave comment)    06/11/24 2105     IP VTE HIGH RISK PATIENT  Once         06/11/24 2105     Place sequential compression device  Until discontinued         06/11/24 2105                  //Core Measures   -DVT proph: SCDs, withholding anticoagulation pending surgical intervention   -Code status: Full    -Surrogate: none provided       Components of this note were documented using a voice recognition system and are subject to errors not corrected at the time the document was proof read. Please contact the author for any clarifications.       Pharmacokinetic Initial Assessment: IV Vancomycin    Assessment/Plan:    Patient was previously initiated at outside facility on intravenous vancomycin with loading dose of 2000 mg once followed by a maintenance dose of vancomycin 1500 mg IV every 12 hours on 06/10. However, the evening dose was delayed due to patient transfer. Plan will be to resume regimen of vancomycin 1500 mg IV every 12 hours but will push back collecting trough until prior to the 3rd dose after re-start..  Desired empiric serum trough concentration is 15 to 20 mcg/mL  Draw vancomycin trough level 60 min prior to third dose on 06/13 at approximately 0030  Pharmacy will continue to follow and monitor vancomycin.      Please contact pharmacy at extension [phone] with any questions regarding this assessment.     Thank you for the consult,   Alexandra Mccarthy       Patient brief summary:  Nba Montoya is a 43 y.o. male initiated on antimicrobial therapy with IV Vancomycin for treatment of suspected intra-abdominal infection and bacteremia    Drug Allergies:   Review of patient's allergies indicates:  No Known Allergies    Actual Body Weight:   86 kg    Renal Function:   Estimated Creatinine Clearance: 132.9 mL/min (A) (based on SCr of 0.68 mg/dL (L)).,     Dialysis Method (if  "applicable):  N/A    CBC (last 72 hours):  Recent Labs   Lab Result Units 06/09/24  0831 06/11/24  0656   WBC x10(3)/mcL 11.31 14.84*   Hgb g/dL 12.5* 12.7*   Hct % 37.5* 38.6*   Platelet x10(3)/mcL 130 318   Mono % %  --  5.5   Eos % %  --  0.2   Basophil % %  --  0.4       Metabolic Panel (last 72 hours):  Recent Labs   Lab Result Units 06/09/24  0831 06/10/24  0353 06/11/24  0450   Sodium mmol/L 137 135* 137   Potassium mmol/L 3.7 3.9 3.6   Chloride mmol/L 108* 105 104   CO2 mmol/L 20* 21* 24   Glucose mg/dL 175* 129* 130*   Blood Urea Nitrogen mg/dL 10.5 8.2* 6.4*   Creatinine mg/dL 0.69* 0.66* 0.68*   Albumin g/dL 2.0* 1.9* 2.1*   Bilirubin Total mg/dL 3.0* 1.8* 1.8*   ALP unit/L 166* 184* 302*   AST unit/L 45* 45* 74*   ALT unit/L 79* 71* 93*       Drug levels (last 3 results):  No results for input(s): "VANCOMYCINRA", "VANCORANDOM", "VANCOMYCINPE", "VANCOPEAK", "VANCOMYCINTR", "VANCOTROUGH" in the last 72 hours.    Microbiologic Results:  Microbiology Results (last 7 days)       ** No results found for the last 168 hours. **            Lico Santiago MD  Department of Hospital Medicine  [facility] ([facility])          "

## 2024-06-12 NOTE — ASSESSMENT & PLAN NOTE
Cultures obtained at outside facility positive for Enterobacter and Klebsiella.  Patient followed by infectious disease and currently on cefepime, vancomycin, and Flagyl.  Plan:  -telemetry  -continue IVFs  -tylenol and antiemetics prn   -continue antibiotics  -f/u repeat cultures  -f/u ideas directed

## 2024-06-12 NOTE — HOSPITAL COURSE
The patient is a 43-year-old  male with hx HLD-on fish oil transferred from [facility] and admitted to [facility] for liver abscesses with culture growing Klebsiella and Klebsiella bacteremia on IV Rocephin. Hepatology consulted an agreed with POC. LFTs trending down. ID consulted and agreed with liver abscess drain placement. He recommended IV Rocephin for 3 weeks. IR consulted and a KARINA drain was placed to left abdomen draining small amount of purulent drainage. Reported chest pain. EKG showed RBBB with T wave inversions in anterior lead. Serial troponin normal. Echo showed normal LVEF, no WMA. Chest pain resolved. CP likely GI in origin- improved with Carafate. PICC line placed. CM consulted and arranged HH and home IV infusion. Pt will f/u with ID from [facility], Dr Petersen, in 2 weeks. He will f/u with his PCP for KARINA drain management, hepatology referral, and stress test if indicated. The patient was seen and examined today and determined to be stable for discharge.

## 2024-06-12 NOTE — SUBJECTIVE & OBJECTIVE
Interval History: +abdominal bloating and mild RUQ pain. Enlarging liver abscesses and fever despite IV abx. IR and ID consulted.    Review of Systems   Constitutional:  Positive for activity change, appetite change, fatigue and fever. Negative for chills and diaphoresis.   HENT:  Negative for congestion, nosebleeds, sore throat and trouble swallowing.    Eyes:  Negative for pain, discharge and visual disturbance.   Respiratory:  Negative for apnea, cough, chest tightness, shortness of breath, wheezing and stridor.    Cardiovascular:  Negative for chest pain, palpitations and leg swelling.   Gastrointestinal:  Positive for abdominal distention (mild) and abdominal pain (RUQ-mild TTP). Negative for blood in stool, constipation, diarrhea, nausea and vomiting.   Endocrine: Negative for cold intolerance and heat intolerance.   Genitourinary:  Negative for difficulty urinating, dysuria, flank pain, frequency and urgency.   Musculoskeletal:  Negative for arthralgias, back pain, joint swelling, myalgias, neck pain and neck stiffness.   Skin:  Negative for rash and wound.   Allergic/Immunologic: Negative for food allergies and immunocompromised state.   Neurological:  Negative for dizziness, seizures, syncope, facial asymmetry, weakness, light-headedness and headaches.   Hematological:  Negative for adenopathy.   Psychiatric/Behavioral:  Negative for agitation, behavioral problems and confusion. The patient is not nervous/anxious.      Objective:     Vital Signs (Most Recent):  Temp: 99 °F (37.2 °C) (06/12/24 1205)  Pulse: 79 (06/12/24 1205)  Resp: 20 (06/12/24 1205)  BP: 128/79 (06/12/24 1205)  SpO2: 96 % (06/12/24 1205) Vital Signs (24h Range):  Temp:  [98.7 °F (37.1 °C)-101 °F (38.3 °C)] 99 °F (37.2 °C)  Pulse:  [76-99] 79  Resp:  [14-20] 20  SpO2:  [96 %-99 %] 96 %  BP: (128-186)/() 128/79     Weight: 86 kg (189 lb 9.5 oz)  Body mass index is 33.59 kg/m².  No intake or output data in the 24 hours ending 06/12/24  1249      Physical Exam  Vitals and nursing note reviewed.   Constitutional:       General: He is not in acute distress.     Appearance: He is well-developed. He is not diaphoretic.   HENT:      Head: Normocephalic and atraumatic.      Nose: Nose normal.   Eyes:      General: No scleral icterus.     Conjunctiva/sclera: Conjunctivae normal.   Cardiovascular:      Rate and Rhythm: Normal rate and regular rhythm.      Heart sounds: Normal heart sounds. No murmur heard.     No friction rub. No gallop.   Pulmonary:      Effort: Pulmonary effort is normal. No respiratory distress.      Breath sounds: Normal breath sounds. No stridor. No wheezing or rales.   Chest:      Chest wall: No tenderness.   Abdominal:      General: Bowel sounds are normal. There is distension (mild).      Palpations: Abdomen is soft.      Tenderness: There is abdominal tenderness (mild TTP RUQ). There is no guarding or rebound.   Musculoskeletal:         General: No tenderness or deformity. Normal range of motion.      Cervical back: Normal range of motion and neck supple.   Skin:     General: Skin is warm and dry.      Coloration: Skin is not pale.      Findings: No erythema or rash.   Neurological:      Mental Status: He is alert and oriented to person, place, and time.      Cranial Nerves: No cranial nerve deficit.      Motor: No abnormal muscle tone.      Coordination: Coordination normal.      Deep Tendon Reflexes: Reflexes are normal and symmetric.   Psychiatric:         Behavior: Behavior normal.         Thought Content: Thought content normal.             Significant Labs: All pertinent labs within the past 24 hours have been reviewed.    Significant Imaging: I have reviewed all pertinent imaging results/findings within the past 24 hours.

## 2024-06-12 NOTE — SUBJECTIVE & OBJECTIVE
No past medical history on file.    No past surgical history on file.    Review of patient's allergies indicates:  No Known Allergies    Current Facility-Administered Medications on File Prior to Encounter   Medication    [DISCONTINUED] acetaminophen tablet 650 mg    [DISCONTINUED] ceFEPIme (MAXIPIME) 2 g in dextrose 5 % in water (D5W) 100 mL IVPB (MB+)    [DISCONTINUED] dextrose 10% bolus 125 mL 125 mL    [DISCONTINUED] dextrose 10% bolus 250 mL 250 mL    [DISCONTINUED] enoxaparin injection 40 mg    [DISCONTINUED] glucagon (human recombinant) injection 1 mg    [DISCONTINUED] glucose chewable tablet 16 g    [DISCONTINUED] glucose chewable tablet 24 g    [DISCONTINUED] hydrALAZINE injection 10 mg    [DISCONTINUED] ibuprofen tablet 400 mg    [DISCONTINUED] lactated ringers infusion    [DISCONTINUED] metroNIDAZOLE tablet 500 mg    [DISCONTINUED] sodium chloride 0.9% flush 10 mL    [DISCONTINUED] vancomycin - pharmacy to dose    [DISCONTINUED] vancomycin 1.5 g in dextrose 5 % 250 mL IVPB (ready to mix)     Current Outpatient Medications on File Prior to Encounter   Medication Sig    dextrose 5 % in water (D5W) PgBk 100 mL with ceFEPIme 2 gram SolR 2 g Inject 2 g into the vein every 8 (eight) hours.    enoxaparin (LOVENOX) 40 mg/0.4 mL Syrg Inject 0.4 mLs (40 mg total) into the skin once daily.    metroNIDAZOLE (FLAGYL) 500 MG tablet Take 1 tablet (500 mg total) by mouth every 8 (eight) hours.    omeprazole (PRILOSEC) 40 MG capsule Take 40 mg by mouth once daily.    ondansetron (ZOFRAN) 4 mg/5 mL solution Take 8 mg by mouth 3 (three) times daily as needed for Nausea.    sucralfate (CARAFATE) 1 gram tablet Take 1 g by mouth 4 (four) times daily.     Family History    None       Tobacco Use    Smoking status: Not on file    Smokeless tobacco: Not on file   Substance and Sexual Activity    Alcohol use: Not on file    Drug use: Not on file    Sexual activity: Not on file     Review of Systems   All other systems reviewed and  are negative.    Objective:     Vital Signs (Most Recent):  Temp: (!) 101 °F (38.3 °C) (06/11/24 2356)  Pulse: 95 (06/11/24 2356)  Resp: 20 (06/11/24 2356)  BP: (!) 186/101 (06/11/24 2356)  SpO2: 98 % (06/11/24 2356) Vital Signs (24h Range):  Temp:  [98.3 °F (36.8 °C)-101 °F (38.3 °C)] 101 °F (38.3 °C)  Pulse:  [] 95  Resp:  [18-20] 20  SpO2:  [96 %-99 %] 98 %  BP: (132-186)/() 186/101     Weight: 86 kg (189 lb 9.5 oz)  Body mass index is 33.59 kg/m².     Physical Exam  Vitals reviewed.   Constitutional:       General: He is not in acute distress.     Appearance: Normal appearance. He is obese. He is not ill-appearing, toxic-appearing or diaphoretic.   HENT:      Head: Normocephalic and atraumatic.      Right Ear: External ear normal.      Left Ear: External ear normal.      Nose: Nose normal. No congestion or rhinorrhea.      Mouth/Throat:      Mouth: Mucous membranes are moist.      Pharynx: Oropharynx is clear. No oropharyngeal exudate or posterior oropharyngeal erythema.   Eyes:      General: No scleral icterus.     Extraocular Movements: Extraocular movements intact.      Conjunctiva/sclera: Conjunctivae normal.      Pupils: Pupils are equal, round, and reactive to light.   Neck:      Vascular: No carotid bruit.   Cardiovascular:      Rate and Rhythm: Normal rate and regular rhythm.      Pulses: Normal pulses.      Heart sounds: Normal heart sounds. No murmur heard.     No friction rub. No gallop.   Pulmonary:      Effort: Pulmonary effort is normal. No respiratory distress.      Breath sounds: Normal breath sounds. No stridor. No wheezing, rhonchi or rales.   Chest:      Chest wall: No tenderness.   Abdominal:      General: Abdomen is flat. There is no distension.      Palpations: Abdomen is soft. There is no mass.      Tenderness: There is abdominal tenderness. There is no guarding or rebound.      Hernia: No hernia is present.      Comments: Hyperactive bowel sounds noted throughout.  Mild TTP  throughout epigastric and right upper quadrant region without evidence of guarding or rebound tenderness noted.   Musculoskeletal:         General: No swelling, tenderness, deformity or signs of injury. Normal range of motion.      Cervical back: Normal range of motion and neck supple. No rigidity or tenderness.   Lymphadenopathy:      Cervical: No cervical adenopathy.   Skin:     General: Skin is warm and dry.      Capillary Refill: Capillary refill takes less than 2 seconds.      Coloration: Skin is not jaundiced or pale.      Findings: No bruising, erythema, lesion or rash.   Neurological:      General: No focal deficit present.      Mental Status: He is alert and oriented to person, place, and time. Mental status is at baseline.      Cranial Nerves: No cranial nerve deficit.      Sensory: No sensory deficit.      Motor: No weakness.      Coordination: Coordination normal.   Psychiatric:         Mood and Affect: Mood normal.         Behavior: Behavior normal.         Thought Content: Thought content normal.         Judgment: Judgment normal.              CRANIAL NERVES     CN III, IV, VI   Pupils are equal, round, and reactive to light.       Significant Labs: All pertinent labs within the past 24 hours have been reviewed.    Significant Imaging: I have reviewed all pertinent imaging results/findings within the past 24 hours.    LABS:  Recent Results (from the past 24 hour(s))   Comprehensive Metabolic Panel    Collection Time: 06/11/24  4:50 AM   Result Value Ref Range    Sodium 137 136 - 145 mmol/L    Potassium 3.6 3.5 - 5.1 mmol/L    Chloride 104 98 - 107 mmol/L    CO2 24 22 - 29 mmol/L    Glucose 130 (H) 74 - 100 mg/dL    Blood Urea Nitrogen 6.4 (L) 8.9 - 20.6 mg/dL    Creatinine 0.68 (L) 0.73 - 1.18 mg/dL    Calcium 8.6 8.4 - 10.2 mg/dL    Protein Total 6.8 6.4 - 8.3 gm/dL    Albumin 2.1 (L) 3.5 - 5.0 g/dL    Globulin 4.7 (H) 2.4 - 3.5 gm/dL    Albumin/Globulin Ratio 0.4 (L) 1.1 - 2.0 ratio    Bilirubin  Total 1.8 (H) <=1.5 mg/dL     (H) 40 - 150 unit/L    ALT 93 (H) 0 - 55 unit/L    AST 74 (H) 5 - 34 unit/L    eGFR >60 mL/min/1.73/m2    Anion Gap 9.0 mEq/L    BUN/Creatinine Ratio 9    CBC with Differential    Collection Time: 06/11/24  6:56 AM   Result Value Ref Range    WBC 14.84 (H) 4.50 - 11.50 x10(3)/mcL    RBC 4.34 (L) 4.70 - 6.10 x10(6)/mcL    Hgb 12.7 (L) 14.0 - 18.0 g/dL    Hct 38.6 (L) 42.0 - 52.0 %    MCV 88.9 80.0 - 94.0 fL    MCH 29.3 27.0 - 31.0 pg    MCHC 32.9 (L) 33.0 - 36.0 g/dL    RDW 14.6 11.5 - 17.0 %    Platelet 318 130 - 400 x10(3)/mcL    MPV 11.2 (H) 7.4 - 10.4 fL    Neut % 82.2 %    Lymph % 10.4 %    Mono % 5.5 %    Eos % 0.2 %    Basophil % 0.4 %    Lymph # 1.55 0.6 - 4.6 x10(3)/mcL    Neut # 12.19 (H) 2.1 - 9.2 x10(3)/mcL    Mono # 0.81 0.1 - 1.3 x10(3)/mcL    Eos # 0.03 0 - 0.9 x10(3)/mcL    Baso # 0.06 <=0.2 x10(3)/mcL    IG# 0.20 (H) 0 - 0.04 x10(3)/mcL    IG% 1.3 %    NRBC% 0.0 %       RADIOLOGY  CT Abdomen Pelvis With IV Contrast NO Oral Contrast    Result Date: 6/10/2024  EXAMINATION: CT ABDOMEN PELVIS WITH IV CONTRAST CLINICAL HISTORY: Abdominal abscess/infection suspected;liver abscess; Sepsis, unspecified organism TECHNIQUE: Low dose axial images, sagittal and coronal reformations were obtained from the lung bases to the pubic symphysis following the IV administration of contrast. Automatic exposure control (AEC) is utilized to reduce patient radiation exposure. COMPARISON: 06/07/2024 FINDINGS: There is bibasilar atelectasis.  There are small bilateral pleural effusions. There are multiple hypoattenuated liver lesions seen which was seen on the prior examination as well.  Liver lesion is seen in the dome of the liver on image 30 series 2.  It measures 4.3 x 3.7 cm.  This area measured 3 cm x 2.7 cm on the prior examination.  Another lesion is seen in the dome of the liver in segment 8 medially.  It measures 3.7 x 3.2 cm.  Previously this area measured 3.2 x 2.6 cm.  There is  a large hypoattenuated heterogeneous area seen in the caudate that measures 6.7 x 7 cm.  Previously this area measured 5 cm x 5.3 cm.  These areas were shown to be liver abscesses on prior examination.  Findings are consistent with progression. The gallbladder appears normal.  No obvious gallstones are seen.  No biliary dilatation is seen.  No pericholecystic fluid is seen. The pancreas appears normal.  No pancreatic mass or lesion is seen. The spleen shows no acute abnormality. The adrenal glands appear normal.  No adrenal nodule is seen. The kidneys appear normal.  No hydronephrosis is seen.  No hydroureter is seen.  No nephrolithiasis is seen.  No obvious ureteral stones are seen. Urinary bladder appears grossly unremarkable. No colitis is seen.  No diverticulitis is seen.  No obvious colonic mass or lesion is seen.  The appendix appears normal. No free air is seen.  No free fluid is seen.     Enlarging liver lesions as outlined above.  These were shown to be likely liver abscesses on prior biopsy.  Findings are consistent with enlarging liver abscesses. Interval development of bibasilar atelectasis and small effusions Electronically signed by: Caleb Pena Date:    06/10/2024 Time:    15:31    CT Biopsy Liver (xpd)    Addendum Date: 6/7/2024    The above report is 4 drainage.  Correct report below. Examination: IR biopsy Clinical history: Hepatic lesions which may be infectious.  Malignancy not excluded Technique: CT-guided biopsy of right hepatic lesion. DLP: 418 Comparison: CT from earlier. Findings: The risk and benefits were explained to the patient expressed understanding.  The soft tissues were anesthetized with a lidocaine solution.  Under CT guidance a needle was advanced to the right hepatic lesion.  Two biopsies were obtained.  The pathologist stated predominantly neutrophils.  The second biopsy was sent to microbiology for culture. Impression: Successful biopsy. Electronically signed by: Kvng  Aimee Date:    06/07/2024 Time:    14:59    Result Date: 6/7/2024  EXAMINATION: CT BIOPSY LIVER (XPD) CLINICAL HISTORY: liver lesion/infectious process; ANESTHESIA: Lidocaine was utilized for local anesthesia. TECHNIQUE: CT guided drainage of a right hepatic collection. DLP: 418 COMPARISON: No relevant prior available for comparison. FINDINGS: The risks, benefits, and potential complications were discussed including bleeding, infection, end organ damage. Informed consent was obtained. The patient was positioned in the left lateral decubitus position. The right hepatic abscess was localized CT.  The skin was marked and the area was prepped and draped in a sterile fashion. The site was anesthetized with 1% lidocaine solution. A 19 gauge, 5 cm coaxial needle was advanced into the collection, and a guidewire was then placed through the needle. The track was then dilated and a 12 Ecuadorean drain was placed. The drain was sutured to the skin and attached to a KARINA bulb. There was 120 cc of pus fluid drained. The patient tolerated the procedure without difficulty. Post-procedure imaging demonstrated a well-positioned catheter.  There were no immediate post-procedure complications. The patient was discharged to his room in stable condition.     Successful placement of a 12 Ecuadorean drain into the right hepatic abscess.  Samples were collected and sent to the lab for analysis. Electronically signed by: Kvng Yu Date:    06/07/2024 Time:    14:54    CT Abdomen Pelvis With IV Contrast NO Oral Contrast    Result Date: 6/7/2024  EXAMINATION: CT ABDOMEN PELVIS WITH IV CONTRAST CLINICAL HISTORY: RUQ pain; TECHNIQUE: Helical acquisition through the abdomen and pelvis with IV contrast.  Three plane reconstructions were provided for review.  mGycm. Automatic exposure control, adjustment of mA/kV or iterative reconstruction technique was used to reduce radiation. COMPARISON: No prior FINDINGS: There is bibasilar atelectasis or  scarring. There are 3 hypoattenuating liver lesions.  The largest is in the caudate measuring up to about 5 cm.  Patent portal vein. No significant abnormality of the gallbladder, spleen, pancreas or adrenals.  No hydronephrosis or suspicious renal lesion. No bowel obstruction.  Normal appendix.  Some rectal wall thickening is favored related to under distension.  No significantly enlarged mesenteric lymph nodes. Urinary bladder not well distended.  No pelvic free fluid.  Abdominal aorta normal in caliber. There are no acute osseous findings.     Hypoattenuating liver lesions could relate to metastatic disease or infection. Electronically signed by: Ivan Mckeon Date:    06/07/2024 Time:    11:43    X-Ray Chest AP Portable    Result Date: 6/7/2024  EXAMINATION: XR CHEST AP PORTABLE CLINICAL HISTORY: Fever, unspecified COMPARISON: No priors FINDINGS: Frontal view of the chest was obtained. The heart is not enlarged.  Lungs are grossly clear.  There is no pneumothorax or significant effusion.     No acute findings. Electronically signed by: Ivan Mckeon Date:    06/07/2024 Time:    06:00      EKG    MICROBIOLOGY    MDM

## 2024-06-12 NOTE — PROGRESS NOTES
Pharmacokinetic Initial Assessment: IV Vancomycin    Assessment/Plan:    Patient was previously initiated at outside facility on intravenous vancomycin with loading dose of 2000 mg once followed by a maintenance dose of vancomycin 1500 mg IV every 12 hours on 06/10. However, the evening dose was delayed due to patient transfer. Plan will be to resume regimen of vancomycin 1500 mg IV every 12 hours but will push back collecting trough until prior to the 3rd dose after re-start..  Desired empiric serum trough concentration is 15 to 20 mcg/mL  Draw vancomycin trough level 60 min prior to third dose on 06/13 at approximately 0030  Pharmacy will continue to follow and monitor vancomycin.      Please contact pharmacy at extension [phone] with any questions regarding this assessment.     Thank you for the consult,   Alexandralouie Mccarthy       Patient brief summary:  Nba Montoya is a 43 y.o. male initiated on antimicrobial therapy with IV Vancomycin for treatment of suspected intra-abdominal infection and bacteremia    Drug Allergies:   Review of patient's allergies indicates:  No Known Allergies    Actual Body Weight:   86 kg    Renal Function:   Estimated Creatinine Clearance: 132.9 mL/min (A) (based on SCr of 0.68 mg/dL (L)).,     Dialysis Method (if applicable):  N/A    CBC (last 72 hours):  Recent Labs   Lab Result Units 06/09/24  0831 06/11/24  0656   WBC x10(3)/mcL 11.31 14.84*   Hgb g/dL 12.5* 12.7*   Hct % 37.5* 38.6*   Platelet x10(3)/mcL 130 318   Mono % %  --  5.5   Eos % %  --  0.2   Basophil % %  --  0.4       Metabolic Panel (last 72 hours):  Recent Labs   Lab Result Units 06/09/24  0831 06/10/24  0353 06/11/24  0450   Sodium mmol/L 137 135* 137   Potassium mmol/L 3.7 3.9 3.6   Chloride mmol/L 108* 105 104   CO2 mmol/L 20* 21* 24   Glucose mg/dL 175* 129* 130*   Blood Urea Nitrogen mg/dL 10.5 8.2* 6.4*   Creatinine mg/dL 0.69* 0.66* 0.68*   Albumin g/dL 2.0* 1.9* 2.1*   Bilirubin Total mg/dL 3.0* 1.8* 1.8*   ALP  "unit/L 166* 184* 302*   AST unit/L 45* 45* 74*   ALT unit/L 79* 71* 93*       Drug levels (last 3 results):  No results for input(s): "VANCOMYCINRA", "VANCORANDOM", "VANCOMYCINPE", "VANCOPEAK", "VANCOMYCINTR", "VANCOTROUGH" in the last 72 hours.    Microbiologic Results:  Microbiology Results (last 7 days)       ** No results found for the last 168 hours. **            "

## 2024-06-12 NOTE — ASSESSMENT & PLAN NOTE
Cont IV Abx Cefepime, Vanco, and Flagyl   Previous culture grew Klebsiella   IR consulted for aspiration and drainage   Will repeat cultures

## 2024-06-12 NOTE — ASSESSMENT & PLAN NOTE
Patient found to have liver abscess on CT abdomen and currently on cefepime, vancomycin, and Flagyl.  Cultures obtained positive for Klebsiella and Enterobacter and followed by ID at outside facility.  T bili currently 1.8, AST/ALT 74/93, alk phosphatase 302, hepatitis panel pain negative.  Patient remains afebrile with T-max 101.0 F with a associated leukocytosis measuring 14.84.  IR and hepatology consulted and awaiting further evaluation/recommendations.  Plan:  -NPO  -Continue current pain regimen, titrate as needed  -Bowel regimen  -Bedrest  -IVFs prn  -Antiemetics prn  -Tylenol as needed for fever   -Continue antibiotics   -f/u cultures  -f/u IR and hepatology

## 2024-06-12 NOTE — CONSULTS
[place] [facility])  Hepatology  Consult Note    Patient Name: bNa Montoya  MRN: [id number]  Admission Date: 6/11/2024  Hospital Length of Stay: 1 days  Attending Provider: Da Castle MD   Primary Care Physician: No, Primary Doctor  Principal Problem:Liver abscess    Inpatient Consult to Telemedicine - Hepatology  Consult performed by: Trace Fernández MD  Consult ordered by: Lico Santiago MD        Subjective:     Transplant status: No    HPI: The patient is a 43-year-old transferred from [facility] and admitted to [facility] for  Liver abscesses with culture growing Klebsiella and Klebsiella bacteremia on IV Cefepime, Flagyl, and Vanco. He has been not feeling well for 2 weeks with pain and fever. No history of chronic liver disease.  CT abd on 6/10 showed enlarging abscesses with elevated liver enzymes. Plan for IR drainage today    Review of Systems  12 point review of sytem negative except hPI  No past medical history on file.    No past surgical history on file.    Family history of liver disease: No    Review of patient's allergies indicates:  No Known Allergies      Tobacco Use    Smoking status: Not on file    Smokeless tobacco: Not on file   Substance and Sexual Activity    Alcohol use: Not on file    Drug use: Not on file    Sexual activity: Not on file       Medications Prior to Admission   Medication Sig Dispense Refill Last Dose    dextrose 5 % in water (D5W) PgBk 100 mL with ceFEPIme 2 gram SolR 2 g Inject 2 g into the vein every 8 (eight) hours.       enoxaparin (LOVENOX) 40 mg/0.4 mL Syrg Inject 0.4 mLs (40 mg total) into the skin once daily.       metroNIDAZOLE (FLAGYL) 500 MG tablet Take 1 tablet (500 mg total) by mouth every 8 (eight) hours.       omeprazole (PRILOSEC) 40 MG capsule Take 40 mg by mouth once daily.       ondansetron (ZOFRAN) 4 mg/5 mL solution Take 8 mg by mouth 3 (three) times daily as needed for Nausea.       sucralfate (CARAFATE) 1 gram tablet Take  1 g by mouth 4 (four) times daily.          Objective:     Vital Signs (Most Recent):  Temp: 99.9 °F (37.7 °C) (06/12/24 1453)  Pulse: 97 (06/12/24 1724)  Resp: 16 (06/12/24 1556)  BP: (!) 146/85 (06/12/24 1453)  SpO2: 96 % (06/12/24 1453) Vital Signs (24h Range):  Temp:  [98.7 °F (37.1 °C)-101 °F (38.3 °C)] 99.9 °F (37.7 °C)  Pulse:  [71-97] 97  Resp:  [14-20] 16  SpO2:  [96 %-100 %] 96 %  BP: (128-186)/() 146/85     Weight: 86 kg (189 lb 9.5 oz) (06/11/24 2356)  Body mass index is 33.59 kg/m².    Physical Exam    Vitals and nursing note reviewed.   Constitutional:       General: He is not in acute distress.     Appearance: He is well-developed. He is not diaphoretic.   HENT:      Head: Normocephalic and atraumatic.      Nose: Nose normal.   Eyes:      General: No scleral icterus.     Conjunctiva/sclera: Conjunctivae normal.   Cardiovascular:      Rate and Rhythm: Normal rate and regular rhythm.      Heart sounds: Normal heart sounds. No murmur heard.     No friction rub. No gallop.   Pulmonary:      Effort: Pulmonary effort is normal. No respiratory distress.      Breath sounds: Normal breath sounds. No stridor. No wheezing or rales.   Chest:      Chest wall: No tenderness.   Abdominal:      General: Bowel sounds are normal. There is distension (mild).      Palpations: Abdomen is soft.      Tenderness: There is abdominal tenderness (mild TTP RUQ). There is no guarding or rebound.   Musculoskeletal:         General: No tenderness or deformity. Normal range of motion.      Cervical back: Normal range of motion and neck supple.   Skin:     General: Skin is warm and dry.      Coloration: Skin is not pale.      Findings: No erythema or rash.   Neurological:      Mental Status: He is alert and oriented to person, place, and time.      Cranial Nerves: No cranial nerve deficit.      Motor: No abnormal muscle tone.      Coordination: Coordination normal.      Deep Tendon Reflexes: Reflexes are normal and symmetric.    Psychiatric:         Behavior: Behavior normal.         Thought Content: Thought content normal.                 Significant Labs:  CBC:   Recent Labs   Lab 06/11/24  0656   WBC 14.84*   RBC 4.34*   HGB 12.7*   HCT 38.6*        CMP:   Recent Labs   Lab 06/11/24  0450   CALCIUM 8.6   ALBUMIN 2.1*      K 3.6   CO2 24      BUN 6.4*   CREATININE 0.68*   ALKPHOS 302*   ALT 93*   AST 74*   BILITOT 1.8*     Coagulation:   Recent Labs   Lab 06/07/24  0934   INR 1.1       Significant Imaging:  CT: Reviewed      Assessment/Plan:     Active Diagnoses:    Diagnosis Date Noted POA    PRINCIPAL PROBLEM:  Liver abscess [K75.0] 06/11/2024 Yes    HLD (hyperlipidemia) [E78.5] 06/12/2024 Yes     Chronic    GERD (gastroesophageal reflux disease) [K21.9] 06/12/2024 Yes     Chronic    Class 1 obesity due to excess calories with body mass index (BMI) of 33.0 to 33.9 in adult [E66.09, Z68.33] 06/12/2024 Not Applicable     Chronic    Bacteremia [R78.81] 06/12/2024 Yes    Transaminitis [R74.01] 06/12/2024 Yes    Hyperbilirubinemia [E80.6] 06/12/2024 Yes    Hypertension [I10] 06/12/2024 Yes    Sepsis [A41.9] 06/12/2024 Yes      Problems Resolved During this Admission:     Liver abscess      Likely source colon considering the cultures  Plan  IR drainage  Cont IV Abx Cefepime, Vanco, and Flagyl   ID on Board      Transaminitis  Transaminitis likely 2/2 sepsis/Liver abscesses        Bacteremia  On Antibiotics as per culture  Consult ID      The patient location is: ([place])  The chief complaint leading to consultation is:  Abnormal Imaging     Visit type: audiovisual     Face to Face time with patient:30    70 minutes of total time spent on the encounter, which includes face to face time and non-face to face time preparing to see the patient (eg, review of tests), Obtaining and/or reviewing separately obtained history, Documenting clinical information in the electronic or other health record, Independently interpreting results  (not separately reported) and communicating results to the patient/family/caregiver, or Care coordination (not separately reported).     Each patient to whom he or she provides medical services by telemedicine is:  (1) informed of the relationship between the physician and patient and the respective role of any other health care provider with respect to management of the patient; and (2) notified that he or she may decline to receive medical services by telemedicine and may withdraw from such care at any time.      Thank you for your consult. I will follow-up with patient. Please contact us if you have any additional questions.    Trace Fernández MD  Hepatology  [facility] [facility] ([facility])

## 2024-06-12 NOTE — ASSESSMENT & PLAN NOTE
Chronic, uncontrolled. Latest blood pressure and vitals reviewed-     Temp:  [98.3 °F (36.8 °C)-101 °F (38.3 °C)]   Pulse:  []   Resp:  [18-20]   BP: (132-186)/()   SpO2:  [96 %-99 %] .   Home meds for hypertension were reviewed and noted below.     While in the hospital, will manage blood pressure as follows;    Will utilize p.r.n. blood pressure medication only if patient's blood pressure greater than 160/100 and he develops symptoms such as worsening chest pain or shortness of breath.

## 2024-06-12 NOTE — PROGRESS NOTES
[facility] Medicine  Progress Note    Patient Name: Nba Montoya  MRN: [id number]  Patient Class: IP- Inpatient   Admission Date: 6/11/2024  Length of Stay: 1 days  Attending Physician: Da Castle MD  Primary Care Provider: Jes, Primary Doctor        Subjective:     Principal Problem:Liver abscess        HPI:  Nba Montoya is a 43 y.o. male with a PMH  has no past medical history on file. who presented as a transfer from [facility] for higher level of care and further evaluation by IR and hepatology after patient was found to have presence of liver abscesses on CT.  Cultures obtained positive for Enterobacter and Klebsiella inpatient initiated on cefepime, vancomycin, and metronidazole per ID recommendations.  At time of bedside assessment, patient lying in bed in no acute distress only complaining of mild abdominal discomfort but otherwise reports feeling well.  Patient resumed on antibiotics and currently awaiting further evaluation/recommendations from IR and hepatology.  Additional history as noted below at time of transfer from  physician.      43-year-old male with a history of hyperlipidemia admitted to [facility] on June 7 with fever and abdominal pain.  CT imaging was concerning for abnormalities in the liver (possible mass versus abscess).  Radiology biopsied the liver (no malignant cells noted on pathology) on June 7.  Cultures from the biopsy are growing Klebsiella, and blood cultures are growing Klebsiella.  The Radiology procedure note mentions a drain being left in place, but the referring provider noted a drain was not left in place. He was seen by Infectious Diseases, and antibiotics were adjusted.  He continued to have fever, and his antibiotics were adjusted.  On repeat imaging, liver lesions appeared to be enlarging.  He was evaluated by Radiology at [facility], and it was felt that there was not percutaneous access  available.  He remains on cefepime, metronidazole, and vancomycin.  Mentation is intact and he is hemodynamically stable.  Requesting transfer to [facility] for Interventional Radiology evaluation of drain placement into the hepatic abscess.  After discussion of the options of a, and go procedure versus transfer, the safest course appears to be transfer to Hospital Medicine at [facility] for Interventional Radiology procedure on the liver abscesses.  If the patient tolerates the procedure well and is stable for a period of time after, could potentially transfer back to [facility] for further treatment.  Requesting transfer to Hospital Medicine at [facility] for potential interventional radiology procedure on June 12.  As far as IR drain management once the patient is transferred back, there will need to be a provider who can manage the drain and remove it at [facility] when it is no longer needed (Radiology or other provider).     June 11: Sodium 137, potassium 3.6, chloride 104, CO2 24, BUN 6.4, creatinine 0.68, glucose 130, calcium 8.6, albumin 2.1, AST 74, ALT 93, total bilirubin 1.8     Nazia 10: Blood cultures collected  -sodium 135, potassium 3.9, chloride 105, CO2 21, BUN 8.2, creatinine 0.66, glucose 129, total bilirubin 1.8, AST 45, ALT 71  -CT abdomen and pelvis with contrast showed enlarging liver lesions.  She had multiple liver lesions seen.  Findings are consistent with enlarging liver abscesses.  Interval development of bibasilar atelectasis and small effusions.     June 9: White blood cells 11.31, hemoglobin 12.5, hematocrit 37.5, platelets 130     June 8: Hepatitis-B and C serologies were nonreactive     June 7: Blood cultures with Klebsiella pneumoniae  -aerobic culture from liver tissue has Klebsiella pneumoniae  -COVID negative, influenza negative  -chest x-ray had no acute findings.  -CT abdomen and pelvis with IV contrast showed hypoattenuating liver lesions could  relate to metastatic disease or infection.  There are 3 hypoattenuating liver lesions with the largest in the caudate measuring up to about 5 cm.  -CT-guided right hepatic abscess biopsy (no rain placed)    PCP: Jes, Primary Doctor      Overview/Hospital Course:  The patient is a 43-year-old  male with hx HLD-on fish oil transferred from [facility] and admitted to [facility] for Sepsis 2/2 Liver abscesses with culture growing Klebsiella and Klebsiella bacteremia on IV Cefepime, Flagyl, and Vanco. Pt was having persistently high fever despite abx treatment. He states that the last time he went to the [facility] was in 2019 which is where he is originally from. Currently works as a  in [place]. Repeat CT abd on 6/10 showed enlarging abscesses. Transaminitis noted.   IR was consulted, ID consulted, Hepatology consulted     Interval History: +abdominal bloating and mild RUQ pain. Enlarging liver abscesses and fever despite IV abx. IR and ID consulted.    Review of Systems   Constitutional:  Positive for activity change, appetite change, fatigue and fever. Negative for chills and diaphoresis.   HENT:  Negative for congestion, nosebleeds, sore throat and trouble swallowing.    Eyes:  Negative for pain, discharge and visual disturbance.   Respiratory:  Negative for apnea, cough, chest tightness, shortness of breath, wheezing and stridor.    Cardiovascular:  Negative for chest pain, palpitations and leg swelling.   Gastrointestinal:  Positive for abdominal distention (mild) and abdominal pain (RUQ-mild TTP). Negative for blood in stool, constipation, diarrhea, nausea and vomiting.   Endocrine: Negative for cold intolerance and heat intolerance.   Genitourinary:  Negative for difficulty urinating, dysuria, flank pain, frequency and urgency.   Musculoskeletal:  Negative for arthralgias, back pain, joint swelling, myalgias, neck pain and neck stiffness.   Skin:  Negative for rash and wound.    Allergic/Immunologic: Negative for food allergies and immunocompromised state.   Neurological:  Negative for dizziness, seizures, syncope, facial asymmetry, weakness, light-headedness and headaches.   Hematological:  Negative for adenopathy.   Psychiatric/Behavioral:  Negative for agitation, behavioral problems and confusion. The patient is not nervous/anxious.      Objective:     Vital Signs (Most Recent):  Temp: 99 °F (37.2 °C) (06/12/24 1205)  Pulse: 79 (06/12/24 1205)  Resp: 20 (06/12/24 1205)  BP: 128/79 (06/12/24 1205)  SpO2: 96 % (06/12/24 1205) Vital Signs (24h Range):  Temp:  [98.7 °F (37.1 °C)-101 °F (38.3 °C)] 99 °F (37.2 °C)  Pulse:  [76-99] 79  Resp:  [14-20] 20  SpO2:  [96 %-99 %] 96 %  BP: (128-186)/() 128/79     Weight: 86 kg (189 lb 9.5 oz)  Body mass index is 33.59 kg/m².  No intake or output data in the 24 hours ending 06/12/24 1249      Physical Exam  Vitals and nursing note reviewed.   Constitutional:       General: He is not in acute distress.     Appearance: He is well-developed. He is not diaphoretic.   HENT:      Head: Normocephalic and atraumatic.      Nose: Nose normal.   Eyes:      General: No scleral icterus.     Conjunctiva/sclera: Conjunctivae normal.   Cardiovascular:      Rate and Rhythm: Normal rate and regular rhythm.      Heart sounds: Normal heart sounds. No murmur heard.     No friction rub. No gallop.   Pulmonary:      Effort: Pulmonary effort is normal. No respiratory distress.      Breath sounds: Normal breath sounds. No stridor. No wheezing or rales.   Chest:      Chest wall: No tenderness.   Abdominal:      General: Bowel sounds are normal. There is distension (mild).      Palpations: Abdomen is soft.      Tenderness: There is abdominal tenderness (mild TTP RUQ). There is no guarding or rebound.   Musculoskeletal:         General: No tenderness or deformity. Normal range of motion.      Cervical back: Normal range of motion and neck supple.   Skin:     General: Skin  "is warm and dry.      Coloration: Skin is not pale.      Findings: No erythema or rash.   Neurological:      Mental Status: He is alert and oriented to person, place, and time.      Cranial Nerves: No cranial nerve deficit.      Motor: No abnormal muscle tone.      Coordination: Coordination normal.      Deep Tendon Reflexes: Reflexes are normal and symmetric.   Psychiatric:         Behavior: Behavior normal.         Thought Content: Thought content normal.             Significant Labs: All pertinent labs within the past 24 hours have been reviewed.    Significant Imaging: I have reviewed all pertinent imaging results/findings within the past 24 hours.    Assessment/Plan:      * Liver abscess  Cont IV Abx Cefepime, Vanco, and Flagyl   Previous culture grew Klebsiella   IR consulted for aspiration and drainage   Will repeat cultures       Bacteremia  Blood cultures 6/7/24 obtained at outside facility positive for Klebsiella.   Repeat blood cultures 6/10/24 show NGTD  Patient followed by infectious disease and currently on IV cefepime, vancomycin, and Flagyl.  Plan:  -continue antibiotics  -f/u repeat cultures  Consult ID      Sepsis  This patient does have evidence of infective focus  My overall impression is sepsis.  Source:  bacteremia and Liver abscess   Antibiotics given-   Antibiotics (72h ago, onward)      Start     Stop Route Frequency Ordered    06/12/24 0030  metroNIDAZOLE tablet 500 mg         -- Oral Every 8 hours 06/12/24 0018    06/12/24 0018  ceFEPIme (MAXIPIME) 2 g in dextrose 5 % in water (D5W) 100 mL IVPB (MB+)         -- IV Every 8 hours (non-standard times) 06/12/24 0018          Latest lactate reviewed-  No results for input(s): "LACTATE", "POCLAC" in the last 72 hours.  Organ dysfunction indicated by Acute liver injury    Fluid challenge Not needed - patient is not hypotensive      Post- resuscitation assessment No - Post resuscitation assessment not needed       Will Not start Pressors- Levophed " "for MAP of 65  Source control achieved by: IV Cefepime, vanco, and Flagyl    Transaminitis  Transaminitis likely 2/2 sepsis/Liver abscesses   Acute hepatitis panel was negative   Hepatology consulted       Hyperbilirubinemia  likely 2/2 sepsis/Liver abscesses   Hepatology consulted       Hypertension  Chronic, uncontrolled. Latest blood pressure and vitals reviewed-     Temp:  [98.3 °F (36.8 °C)-101 °F (38.3 °C)]   Pulse:  []   Resp:  [18-20]   BP: (132-186)/()   SpO2:  [96 %-99 %] .   Home meds for hypertension were reviewed and noted below.     While in the hospital, will manage blood pressure as follows;    Will utilize p.r.n. blood pressure medication only if patient's blood pressure greater than 160/100 and he develops symptoms such as worsening chest pain or shortness of breath.      Class 1 obesity due to excess calories with body mass index (BMI) of 33.0 to 33.9 in adult  Body mass index is 33.59 kg/m². Morbid obesity complicates all aspects of disease management from diagnostic modalities to treatment. Weight loss encouraged and health benefits explained to patient.       GERD (gastroesophageal reflux disease)  Chronic. Stable. Currently asymptomatic. Home medications include PPI/Antacids as needed.  Plan:  -Continue PPI/Antacids as needed       HLD (hyperlipidemia)  Patient is chronically on statin.will not continue for now. Last Lipid Panel: No results found for: "CHOL", "HDL", "LDLCALC", "TRIG", "CHOLHDL"  Plan:  -Continue home medication  -low fat/low calorie diet        VTE Risk Mitigation (From admission, onward)           Ordered     Reason for No Pharmacological VTE Prophylaxis  Once        Comments: Planned surgical procedure   Question:  Reasons:  Answer:  Physician Provided (leave comment)    06/11/24 2105     IP VTE HIGH RISK PATIENT  Once         06/11/24 2105     Place sequential compression device  Until discontinued         06/11/24 2105                    Discharge Planning "   CORY:      Code Status: Full Code   Is the patient medically ready for discharge?:     Reason for patient still in hospital (select all that apply): Patient trending condition  Discharge Plan A: Home                  Cristine Petersen NP  Department of Hospital Medicine   [facility] ([facility])

## 2024-06-12 NOTE — HPI
Nba Montoya is a 43 y.o. male with a PMH  has no past medical history on file. who presented as a transfer from [facility] for higher level of care and further evaluation by IR and hepatology after patient was found to have presence of liver abscesses on CT.  Cultures obtained positive for Enterobacter and Klebsiella inpatient initiated on cefepime, vancomycin, and metronidazole per ID recommendations.  At time of bedside assessment, patient lying in bed in no acute distress only complaining of mild abdominal discomfort but otherwise reports feeling well.  Patient resumed on antibiotics and currently awaiting further evaluation/recommendations from IR and hepatology.  Additional history as noted below at time of transfer from  physician.      43-year-old male with a history of hyperlipidemia admitted to [facility] on June 7 with fever and abdominal pain.  CT imaging was concerning for abnormalities in the liver (possible mass versus abscess).  Radiology biopsied the liver (no malignant cells noted on pathology) on June 7.  Cultures from the biopsy are growing Klebsiella, and blood cultures are growing Klebsiella.  The Radiology procedure note mentions a drain being left in place, but the referring provider noted a drain was not left in place. He was seen by Infectious Diseases, and antibiotics were adjusted.  He continued to have fever, and his antibiotics were adjusted.  On repeat imaging, liver lesions appeared to be enlarging.  He was evaluated by Radiology at [facility], and it was felt that there was not percutaneous access available.  He remains on cefepime, metronidazole, and vancomycin.  Mentation is intact and he is hemodynamically stable.  Requesting transfer to [facility] for Interventional Radiology evaluation of drain placement into the hepatic abscess.  After discussion of the options of a, and go procedure versus transfer, the safest course  appears to be transfer to Hospital Medicine at [facility] for Interventional Radiology procedure on the liver abscesses.  If the patient tolerates the procedure well and is stable for a period of time after, could potentially transfer back to [facility] for further treatment.  Requesting transfer to Hospital Medicine at [facility] for potential interventional radiology procedure on June 12.  As far as IR drain management once the patient is transferred back, there will need to be a provider who can manage the drain and remove it at [facility] when it is no longer needed (Radiology or other provider).     June 11: Sodium 137, potassium 3.6, chloride 104, CO2 24, BUN 6.4, creatinine 0.68, glucose 130, calcium 8.6, albumin 2.1, AST 74, ALT 93, total bilirubin 1.8     Nazia 10: Blood cultures collected  -sodium 135, potassium 3.9, chloride 105, CO2 21, BUN 8.2, creatinine 0.66, glucose 129, total bilirubin 1.8, AST 45, ALT 71  -CT abdomen and pelvis with contrast showed enlarging liver lesions.  She had multiple liver lesions seen.  Findings are consistent with enlarging liver abscesses.  Interval development of bibasilar atelectasis and small effusions.     June 9: White blood cells 11.31, hemoglobin 12.5, hematocrit 37.5, platelets 130     June 8: Hepatitis-B and C serologies were nonreactive     June 7: Blood cultures with Klebsiella pneumoniae  -aerobic culture from liver tissue has Klebsiella pneumoniae  -COVID negative, influenza negative  -chest x-ray had no acute findings.  -CT abdomen and pelvis with IV contrast showed hypoattenuating liver lesions could relate to metastatic disease or infection.  There are 3 hypoattenuating liver lesions with the largest in the caudate measuring up to about 5 cm.  -CT-guided right hepatic abscess biopsy (no rain placed)    PCP: No, Primary Doctor

## 2024-06-12 NOTE — ASSESSMENT & PLAN NOTE
"Patient is chronically on statin.will not continue for now. Last Lipid Panel: No results found for: "CHOL", "HDL", "LDLCALC", "TRIG", "CHOLHDL"  Plan:  -Continue home medication  -low fat/low calorie diet    "

## 2024-06-12 NOTE — PLAN OF CARE
Pt oriented x4.  VSS.  Pt febrile during shift. Tylenol given  All meds administered per order.   Pt remained free of falls this shift.   Plan of care reviewed. Patient verbalizes understanding.   Pt moving/turning independently.   Bed low, side rails up x 2, wheels locked, call light in reach.   Patient instructed to call for assistance.  Patient education provided  Will continue to monitor.       Problem: Adult Inpatient Plan of Care  Goal: Plan of Care Review  Outcome: Progressing  Goal: Patient-Specific Goal (Individualized)  Outcome: Progressing  Goal: Absence of Hospital-Acquired Illness or Injury  Outcome: Progressing  Goal: Optimal Comfort and Wellbeing  Outcome: Progressing  Goal: Readiness for Transition of Care  Outcome: Progressing     Problem: Wound  Goal: Optimal Coping  Outcome: Progressing  Goal: Optimal Functional Ability  Outcome: Progressing  Goal: Absence of Infection Signs and Symptoms  Outcome: Progressing  Goal: Improved Oral Intake  Outcome: Progressing  Goal: Optimal Pain Control and Function  Outcome: Progressing  Goal: Skin Health and Integrity  Outcome: Progressing  Goal: Optimal Wound Healing  Outcome: Progressing

## 2024-06-13 LAB
ALBUMIN SERPL BCP-MCNC: 2.2 G/DL (ref 3.5–5.2)
ALP SERPL-CCNC: 211 U/L (ref 55–135)
ALT SERPL W/O P-5'-P-CCNC: 70 U/L (ref 10–44)
ANION GAP SERPL CALC-SCNC: 7 MMOL/L (ref 8–16)
AST SERPL-CCNC: 48 U/L (ref 10–40)
BASOPHILS # BLD AUTO: 0.05 K/UL (ref 0–0.2)
BASOPHILS NFR BLD: 0.3 % (ref 0–1.9)
BILIRUB SERPL-MCNC: 1.4 MG/DL (ref 0.1–1)
BUN SERPL-MCNC: 10 MG/DL (ref 6–20)
CALCIUM SERPL-MCNC: 8.7 MG/DL (ref 8.7–10.5)
CHLORIDE SERPL-SCNC: 105 MMOL/L (ref 95–110)
CO2 SERPL-SCNC: 23 MMOL/L (ref 23–29)
CREAT SERPL-MCNC: 0.7 MG/DL (ref 0.5–1.4)
DIFFERENTIAL METHOD BLD: ABNORMAL
EOSINOPHIL # BLD AUTO: 0.1 K/UL (ref 0–0.5)
EOSINOPHIL NFR BLD: 0.7 % (ref 0–8)
ERYTHROCYTE [DISTWIDTH] IN BLOOD BY AUTOMATED COUNT: 14.4 % (ref 11.5–14.5)
EST. GFR  (NO RACE VARIABLE): >60 ML/MIN/1.73 M^2
GLUCOSE SERPL-MCNC: 108 MG/DL (ref 70–110)
HCT VFR BLD AUTO: 35.3 % (ref 40–54)
HGB BLD-MCNC: 11.9 G/DL (ref 14–18)
IMM GRANULOCYTES # BLD AUTO: 0.28 K/UL (ref 0–0.04)
IMM GRANULOCYTES NFR BLD AUTO: 1.7 % (ref 0–0.5)
INR PPP: 1.1 (ref 0.8–1.2)
LYMPHOCYTES # BLD AUTO: 2.1 K/UL (ref 1–4.8)
LYMPHOCYTES NFR BLD: 12.5 % (ref 18–48)
MAGNESIUM SERPL-MCNC: 2.3 MG/DL (ref 1.6–2.6)
MCH RBC QN AUTO: 30.1 PG (ref 27–31)
MCHC RBC AUTO-ENTMCNC: 33.7 G/DL (ref 32–36)
MCV RBC AUTO: 89 FL (ref 82–98)
MONOCYTES # BLD AUTO: 1.1 K/UL (ref 0.3–1)
MONOCYTES NFR BLD: 6.4 % (ref 4–15)
NEUTROPHILS # BLD AUTO: 13.2 K/UL (ref 1.8–7.7)
NEUTROPHILS NFR BLD: 78.4 % (ref 38–73)
NRBC BLD-RTO: 0 /100 WBC
OHS QRS DURATION: 126 MS
OHS QTC CALCULATION: 505 MS
PHOSPHATE SERPL-MCNC: 3.2 MG/DL (ref 2.7–4.5)
PLATELET # BLD AUTO: 451 K/UL (ref 150–450)
PMV BLD AUTO: 9.9 FL (ref 9.2–12.9)
POTASSIUM SERPL-SCNC: 3.8 MMOL/L (ref 3.5–5.1)
PROT SERPL-MCNC: 6.7 G/DL (ref 6–8.4)
PROTHROMBIN TIME: 11.8 SEC (ref 9–12.5)
RBC # BLD AUTO: 3.95 M/UL (ref 4.6–6.2)
SODIUM SERPL-SCNC: 135 MMOL/L (ref 136–145)
TROPONIN I SERPL DL<=0.01 NG/ML-MCNC: <0.006 NG/ML (ref 0–0.03)
TROPONIN I SERPL DL<=0.01 NG/ML-MCNC: <0.006 NG/ML (ref 0–0.03)
VANCOMYCIN TROUGH SERPL-MCNC: 2.6 UG/ML (ref 10–22)
WBC # BLD AUTO: 16.82 K/UL (ref 3.9–12.7)

## 2024-06-13 PROCEDURE — 85025 COMPLETE CBC W/AUTO DIFF WBC: CPT | Performed by: NURSE PRACTITIONER

## 2024-06-13 PROCEDURE — A4216 STERILE WATER/SALINE, 10 ML: HCPCS | Performed by: NURSE PRACTITIONER

## 2024-06-13 PROCEDURE — 99233 SBSQ HOSP IP/OBS HIGH 50: CPT | Mod: NSCH,,, | Performed by: INTERNAL MEDICINE

## 2024-06-13 PROCEDURE — 36569 INSJ PICC 5 YR+ W/O IMAGING: CPT

## 2024-06-13 PROCEDURE — 85610 PROTHROMBIN TIME: CPT | Performed by: NURSE PRACTITIONER

## 2024-06-13 PROCEDURE — C1751 CATH, INF, PER/CENT/MIDLINE: HCPCS

## 2024-06-13 PROCEDURE — 63600175 PHARM REV CODE 636 W HCPCS: Mod: JZ,JG | Performed by: NURSE PRACTITIONER

## 2024-06-13 PROCEDURE — 63600175 PHARM REV CODE 636 W HCPCS: Performed by: INTERNAL MEDICINE

## 2024-06-13 PROCEDURE — 84484 ASSAY OF TROPONIN QUANT: CPT | Mod: 91 | Performed by: NURSE PRACTITIONER

## 2024-06-13 PROCEDURE — 63600175 PHARM REV CODE 636 W HCPCS: Performed by: NURSE PRACTITIONER

## 2024-06-13 PROCEDURE — 99900035 HC TECH TIME PER 15 MIN (STAT)

## 2024-06-13 PROCEDURE — 02HV33Z INSERTION OF INFUSION DEVICE INTO SUPERIOR VENA CAVA, PERCUTANEOUS APPROACH: ICD-10-PCS | Performed by: INTERNAL MEDICINE

## 2024-06-13 PROCEDURE — 94799 UNLISTED PULMONARY SVC/PX: CPT

## 2024-06-13 PROCEDURE — 80053 COMPREHEN METABOLIC PANEL: CPT | Performed by: NURSE PRACTITIONER

## 2024-06-13 PROCEDURE — 21400001 HC TELEMETRY ROOM

## 2024-06-13 PROCEDURE — 84100 ASSAY OF PHOSPHORUS: CPT | Performed by: NURSE PRACTITIONER

## 2024-06-13 PROCEDURE — 83735 ASSAY OF MAGNESIUM: CPT | Performed by: NURSE PRACTITIONER

## 2024-06-13 PROCEDURE — 63600175 PHARM REV CODE 636 W HCPCS: Performed by: HOSPITALIST

## 2024-06-13 PROCEDURE — 25000003 PHARM REV CODE 250: Performed by: INTERNAL MEDICINE

## 2024-06-13 PROCEDURE — 25000003 PHARM REV CODE 250: Performed by: HOSPITALIST

## 2024-06-13 PROCEDURE — 93005 ELECTROCARDIOGRAM TRACING: CPT

## 2024-06-13 PROCEDURE — G0408 INPT/TELE FOLLOW UP 35: HCPCS | Mod: GT,,, | Performed by: INTERNAL MEDICINE

## 2024-06-13 PROCEDURE — 93010 ELECTROCARDIOGRAM REPORT: CPT | Mod: ,,, | Performed by: INTERNAL MEDICINE

## 2024-06-13 PROCEDURE — 25000003 PHARM REV CODE 250: Performed by: NURSE PRACTITIONER

## 2024-06-13 PROCEDURE — 36415 COLL VENOUS BLD VENIPUNCTURE: CPT | Performed by: NURSE PRACTITIONER

## 2024-06-13 RX ORDER — SODIUM CHLORIDE 0.9 % (FLUSH) 0.9 %
10 SYRINGE (ML) INJECTION
Status: DISCONTINUED | OUTPATIENT
Start: 2024-06-13 | End: 2024-06-14 | Stop reason: HOSPADM

## 2024-06-13 RX ORDER — SODIUM CHLORIDE 0.9 % (FLUSH) 0.9 %
10 SYRINGE (ML) INJECTION EVERY 6 HOURS
Status: DISCONTINUED | OUTPATIENT
Start: 2024-06-13 | End: 2024-06-14 | Stop reason: HOSPADM

## 2024-06-13 RX ORDER — HYDRALAZINE HYDROCHLORIDE 20 MG/ML
10 INJECTION INTRAMUSCULAR; INTRAVENOUS EVERY 4 HOURS PRN
Status: DISCONTINUED | OUTPATIENT
Start: 2024-06-13 | End: 2024-06-14 | Stop reason: HOSPADM

## 2024-06-13 RX ORDER — HYDRALAZINE HYDROCHLORIDE 20 MG/ML
10 INJECTION INTRAMUSCULAR; INTRAVENOUS EVERY 4 HOURS PRN
Status: DISCONTINUED | OUTPATIENT
Start: 2024-06-13 | End: 2024-06-13

## 2024-06-13 RX ADMIN — CEFEPIME 2 G: 2 INJECTION, POWDER, FOR SOLUTION INTRAVENOUS at 12:06

## 2024-06-13 RX ADMIN — CEFTRIAXONE 2 G: 2 INJECTION, POWDER, FOR SOLUTION INTRAMUSCULAR; INTRAVENOUS at 07:06

## 2024-06-13 RX ADMIN — HYDRALAZINE HYDROCHLORIDE 10 MG: 20 INJECTION, SOLUTION INTRAMUSCULAR; INTRAVENOUS at 06:06

## 2024-06-13 RX ADMIN — Medication 10 ML: at 06:06

## 2024-06-13 RX ADMIN — SODIUM CHLORIDE: 9 INJECTION, SOLUTION INTRAVENOUS at 06:06

## 2024-06-13 RX ADMIN — MORPHINE SULFATE 2 MG: 2 INJECTION, SOLUTION INTRAMUSCULAR; INTRAVENOUS at 12:06

## 2024-06-13 NOTE — ASSESSMENT & PLAN NOTE
Cont IV Rocephin  Previous culture grew Klebsiella   S/p I&D 06/12/2024, Drain in place  Hepatology consulted and following  Abscess fluid studies pending  Blood cultures NGTD

## 2024-06-13 NOTE — PLAN OF CARE
Pt oriented x4.  VSS.  Pt remained afebrile throughout this shift.   All meds administered per order.   Pt remained free of falls this shift.   Plan of care reviewed. Patient verbalizes understanding.   Pt moving/turning independently.   Bed low, side rails up x 2, wheels locked, call light in reach.   Patient instructed to call for assistance.  Patient education provided  Will continue to monitor.     Problem: Adult Inpatient Plan of Care  Goal: Plan of Care Review  Outcome: Progressing  Goal: Patient-Specific Goal (Individualized)  Outcome: Progressing  Goal: Absence of Hospital-Acquired Illness or Injury  Outcome: Progressing  Goal: Optimal Comfort and Wellbeing  Outcome: Progressing  Goal: Readiness for Transition of Care  Outcome: Progressing     Problem: Wound  Goal: Optimal Coping  Outcome: Progressing  Goal: Optimal Functional Ability  Outcome: Progressing  Goal: Absence of Infection Signs and Symptoms  Outcome: Progressing  Goal: Improved Oral Intake  Outcome: Progressing  Goal: Optimal Pain Control and Function  Outcome: Progressing  Goal: Skin Health and Integrity  Outcome: Progressing  Goal: Optimal Wound Healing  Outcome: Progressing     Problem: Skin Injury Risk Increased  Goal: Skin Health and Integrity  Outcome: Progressing     Problem: Sepsis/Septic Shock  Goal: Optimal Coping  Outcome: Progressing  Goal: Absence of Bleeding  Outcome: Progressing  Goal: Blood Glucose Level Within Targeted Range  Outcome: Progressing  Goal: Absence of Infection Signs and Symptoms  Outcome: Progressing  Goal: Optimal Nutrition Intake  Outcome: Progressing

## 2024-06-13 NOTE — SUBJECTIVE & OBJECTIVE
Interval History: Pt sitting up on the side of the bed with family at the bedside. Reports feeling much better and afebrile today. Reporting mild bloating and chest pain. Cardiac workup shows echo pending, troponin <0.0006, and EKG results shows RBBB with T wave inversions. Discussed with patient and family plans to discharge home with PICC line and IV abx for weeks.    Review of Systems   Constitutional:  Positive for fatigue. Negative for activity change, chills and fever.   HENT:  Negative for congestion, rhinorrhea and sore throat.    Eyes:  Negative for photophobia and visual disturbance.   Respiratory:  Negative for cough, chest tightness and shortness of breath.    Cardiovascular:  Positive for chest pain.   Gastrointestinal:  Positive for abdominal distention and abdominal pain. Negative for constipation, diarrhea, nausea and vomiting.   Endocrine: Negative for polydipsia, polyphagia and polyuria.   Genitourinary:  Negative for difficulty urinating and dysuria.   Musculoskeletal:  Negative for arthralgias, gait problem and myalgias.   Skin:         Incision to left abdomen with drain   Allergic/Immunologic: Negative for environmental allergies.   Neurological:  Negative for syncope, weakness, numbness and headaches.   Hematological:  Does not bruise/bleed easily.   Psychiatric/Behavioral:  Negative for agitation and confusion. The patient is nervous/anxious.      Objective:     Vital Signs (Most Recent):  Temp: 99.1 °F (37.3 °C) (06/13/24 1638)  Pulse: 81 (06/13/24 1656)  Resp: 18 (06/13/24 1638)  BP: (!) 170/90 (NP notified) (06/13/24 1656)  SpO2: 96 % (06/13/24 1656) Vital Signs (24h Range):  Temp:  [98.6 °F (37 °C)-99.6 °F (37.6 °C)] 99.1 °F (37.3 °C)  Pulse:  [81-97] 81  Resp:  [16-20] 18  SpO2:  [94 %-97 %] 96 %  BP: (139-170)/(73-93) 170/90     Weight: 86 kg (189 lb 9.5 oz)  Body mass index is 33.59 kg/m².    Intake/Output Summary (Last 24 hours) at 6/13/2024 1704  Last data filed at 6/13/2024  1617  Gross per 24 hour   Intake 1798.17 ml   Output 20 ml   Net 1778.17 ml         Physical Exam  Vitals and nursing note reviewed.   Constitutional:       Appearance: Normal appearance. He is not ill-appearing or toxic-appearing.   HENT:      Head: Normocephalic and atraumatic.      Right Ear: External ear normal.      Left Ear: External ear normal.      Nose: Nose normal. No congestion or rhinorrhea.      Mouth/Throat:      Mouth: Mucous membranes are moist.      Pharynx: No posterior oropharyngeal erythema.   Eyes:      Extraocular Movements: Extraocular movements intact.      Conjunctiva/sclera: Conjunctivae normal.      Pupils: Pupils are equal, round, and reactive to light.   Neck:      Vascular: No carotid bruit.   Cardiovascular:      Rate and Rhythm: Normal rate. Rhythm irregular.      Pulses: Normal pulses.      Heart sounds: Normal heart sounds. No murmur heard.     No friction rub.   Pulmonary:      Effort: Pulmonary effort is normal. No respiratory distress.      Breath sounds: Normal breath sounds. No wheezing.   Abdominal:      General: Bowel sounds are normal. There is distension.      Palpations: Abdomen is soft.      Tenderness: There is abdominal tenderness.      Comments: Mildly distended  Mild TTP due to drain placement   Musculoskeletal:         General: Normal range of motion.      Cervical back: Normal range of motion.      Right lower leg: No edema.      Left lower leg: No edema.   Skin:     General: Skin is warm and dry.      Findings: No erythema or rash.      Comments: Incision to left abdomen with drain    Neurological:      General: No focal deficit present.      Mental Status: He is alert.      Sensory: No sensory deficit.      Coordination: Coordination normal.      Gait: Gait normal.   Psychiatric:         Mood and Affect: Mood normal.         Behavior: Behavior normal.         Thought Content: Thought content normal.         Judgment: Judgment normal.             Significant Labs:  All pertinent labs within the past 24 hours have been reviewed.    Significant Imaging: I have reviewed all pertinent imaging results/findings within the past 24 hours.

## 2024-06-13 NOTE — PLAN OF CARE
[facility] repSil, at bedside to complete home infusion education. NP updated.  SW to follow.     3:27PM: Sil with [facility] completed bedside teach. Agency to arrange delivery to [facility] tomorrow. Pt will need AM dose of rocephin prior to discharge. Home health orders needed for PICC line care/labs. NP updated.

## 2024-06-13 NOTE — PLAN OF CARE
A247/A247 XAVIER Montoya is a 43 y.o.male admitted on 6/11/2024 for Hepatic abscess   Code Status: Full Code MRN: [id number]   Review of patient's allergies indicates:  No Known Allergies  No past medical history on file.   PRN meds    acetaminophen, 650 mg, Q4H PRN  acetaminophen, 650 mg, Q8H PRN  aluminum-magnesium hydroxide-simethicone, 30 mL, QID PRN  dextrose 10%, 12.5 g, PRN  dextrose 10%, 25 g, PRN  glucagon (human recombinant), 1 mg, PRN  glucose, 16 g, PRN  glucose, 24 g, PRN  HYDROcodone-acetaminophen, 1 tablet, Q6H PRN  melatonin, 6 mg, Nightly PRN  morphine, 2 mg, Q4H PRN  naloxone, 0.02 mg, PRN  ondansetron, 4 mg, Q8H PRN  polyethylene glycol, 17 g, Daily PRN  promethazine, 25 mg, Q6H PRN  simethicone, 1 tablet, QID PRN  sodium chloride 0.9%, 10 mL, PRN  sodium chloride 0.9%, 3 mL, Q12H PRN      Chart check completed. Will continue plan of care.      Orientation: oriented x 4  Thurmond Coma Scale Score: 15     Lead Monitored: Lead II Rhythm: normal sinus rhythm    Cardiac/Telemetry Box Number: [id number]  VTE Required Core Measure: (SCDs) Sequential compression device initiated/maintained Last Bowel Movement: 06/12/24  Diet Adult Regular (IDDSI Level 7)     Scotty Score: 19  Fall Risk Score: 6  Accucheck []       Lines/Drains/Airways       Peripherally Inserted Central Catheter Line  Duration             PICC Double Lumen 06/13/24 1355 right basilic <1 day              Drain  Duration                  Closed/Suction Drain 06/12/24 1431 Tube - 1 Lateral RUQ Bulb 8 Fr. 1 day              Peripheral Intravenous Line  Duration                  Peripheral IV - Single Lumen 06/10/24 1030 18 G Anterior;Left Upper Arm 3 days                       Problem: Adult Inpatient Plan of Care  Goal: Plan of Care Review  Outcome: Progressing  Goal: Patient-Specific Goal (Individualized)  Outcome: Progressing  Goal: Absence of Hospital-Acquired Illness or Injury  Outcome: Progressing  Goal: Optimal Comfort and Wellbeing  Outcome:  Progressing  Goal: Readiness for Transition of Care  Outcome: Progressing     Problem: Wound  Goal: Optimal Coping  Outcome: Progressing  Goal: Optimal Functional Ability  Outcome: Progressing  Goal: Absence of Infection Signs and Symptoms  Outcome: Progressing  Goal: Improved Oral Intake  Outcome: Progressing  Goal: Optimal Pain Control and Function  Outcome: Progressing  Goal: Skin Health and Integrity  Outcome: Progressing  Goal: Optimal Wound Healing  Outcome: Progressing     Problem: Skin Injury Risk Increased  Goal: Skin Health and Integrity  Outcome: Progressing     Problem: Sepsis/Septic Shock  Goal: Optimal Coping  Outcome: Progressing  Goal: Absence of Bleeding  Outcome: Progressing  Goal: Blood Glucose Level Within Targeted Range  Outcome: Progressing  Goal: Absence of Infection Signs and Symptoms  Outcome: Progressing  Goal: Optimal Nutrition Intake  Outcome: Progressing     Problem: Fall Injury Risk  Goal: Absence of Fall and Fall-Related Injury  Outcome: Progressing     Problem: Infection  Goal: Absence of Infection Signs and Symptoms  Outcome: Progressing

## 2024-06-13 NOTE — ASSESSMENT & PLAN NOTE
Caller: RAINER [facility]aker [facility]    Relationship: Other    Best call back number: [phone] [employer]     What orders are you requesting (i.e. lab or imaging): FOR OT 2 TIMES A WEEK FOR 2 WEEKS STARTING NEXT WEEK    Additional notes: HAS MISSED LAST 3 APPOINTMENTS WITH PATIENT AND NEEDS TO EXTEND TIME            Follow primary team /PCP

## 2024-06-13 NOTE — ASSESSMENT & PLAN NOTE
See plan above under liver abscess-   Isolate is Klebsiella- this can cause metastatic infection- needs close follow up  Will use Rocephin

## 2024-06-13 NOTE — HPI
43 y.o. male who was transferred  from [facility] for higher level of care -for liver abscess .   after patient was found to have presence of liver abscesses on CT.  Cultures obtained positive for .  He was initially started on   cefepime, vancomycin, and metronidazole   CT guided biopsy -06/07- Klebsiella  Labs and imaging test  Blood culture- o6/07- Klebsiella     CT -06/10-There are multiple hypoattenuated liver lesions seen which was seen on the prior examination as well.  Liver lesion is seen in the dome of the liver on image 30 series 2.  It measures 4.3 x 3.7 cm.  This area measured 3 cm x 2.7 cm on the prior examination.  Another lesion is seen in the dome of the liver in segment 8 medially.  It measures 3.7 x 3.2 cm.  Previously this area measured 3.2 x 2.6 cm.  There is a large hypoattenuated heterogeneous area seen in the caudate that measures 6.7 x 7 cm.  Previously this area measured 5 cm x 5.3 cm.  These areas were shown to be liver abscesses on prior examination.  Findings are consistent with progression.   CBC -wbc 14    He was seen by IR and had -CT guided drain of a caudate lobe abscess. -06/12

## 2024-06-13 NOTE — ASSESSMENT & PLAN NOTE
Blood cultures 6/7/24 obtained at outside facility positive for Klebsiella.   Repeat blood cultures 6/10/24 show NGTD  Patient followed by infectious disease and currently on IV Rocephin.  Plan:  -continue antibiotics  -f/u repeat cultures

## 2024-06-13 NOTE — PROCEDURES
"Nba Montoya is a 43 y.o. male patient.    Temp: 98.6 °F (37 °C) (06/13/24 0754)  Pulse: 88 (06/13/24 0939)  Resp: 20 (06/13/24 0754)  BP: (!) 141/86 (06/13/24 0754)  SpO2: 95 % (06/13/24 0754)  Weight: 86 kg (189 lb 9.5 oz) (06/11/24 2356)  Height: 5' 3" (160 cm) (06/11/24 2057)    PICC  Date/Time: 6/13/2024 1:55 PM  Performed by: Solis Valdez RN  Consent Done: Yes  Time out: Immediately prior to procedure a time out was called to verify the correct patient, procedure, equipment, support staff and site/side marked as required  Indications: med administration and vascular access  Anesthesia: local infiltration  Local anesthetic: lidocaine 1% without epinephrine  Anesthetic Total (mL): 3  Preparation: skin prepped with ChloraPrep  Skin prep agent dried: skin prep agent completely dried prior to procedure  Sterile barriers: all five maximum sterile barriers used - cap, mask, sterile gown, sterile gloves, and large sterile sheet  Hand hygiene: hand hygiene performed prior to central venous catheter insertion  Location details: right basilic  Catheter type: double lumen  Catheter size: 4 Fr  Catheter Length: 38cm    Ultrasound guidance: yes  Vessel Caliber: medium and patent, compressibility normal  Vascular Doppler: not done  Needle advanced into vessel with real time Ultrasound guidance.  Guidewire confirmed in vessel.  Sterile sheath used.  no esophageal manometryNumber of attempts: 1  Post-procedure: blood return through all ports, chlorhexidine patch and sterile dressing applied  Estimated blood loss (mL): 0  Specimens: No  Implants: No  Complications: none          Name Solis Valdez RN  6/13/2024    "

## 2024-06-13 NOTE — ASSESSMENT & PLAN NOTE
"This patient does have evidence of infective focus  My overall impression is sepsis.  Source:  bacteremia and Liver abscess   Antibiotics given-   Antibiotics (72h ago, onward)    Start     Stop Route Frequency Ordered    06/13/24 0800  cefTRIAXone (ROCEPHIN) 2 g in dextrose 5 % in water (D5W) 100 mL IVPB (MB+)         -- IV Every 24 hours (non-standard times) 06/13/24 0459        Latest lactate reviewed-  No results for input(s): "LACTATE", "POCLAC" in the last 72 hours.  Organ dysfunction indicated by Acute liver injury    Fluid challenge Not needed - patient is not hypotensive      Post- resuscitation assessment No - Post resuscitation assessment not needed       Will Not start Pressors- Levophed for MAP of 65  Source control achieved by: IV Cefepime, vanco, and Flagyl  "

## 2024-06-13 NOTE — PROGRESS NOTES
[facility] Medicine  Progress Note    Patient Name: Nba Montoya  MRN: [id number]  Patient Class: IP- Inpatient   Admission Date: 6/11/2024  Length of Stay: 2 days  Attending Physician: Christian Vasquez MD  Primary Care Provider: Jes, Primary Doctor        Subjective:     Principal Problem:Hepatic abscess        HPI:  Nba Montoya is a 43 y.o. male with a PMH  has no past medical history on file. who presented as a transfer from [facility] for higher level of care and further evaluation by IR and hepatology after patient was found to have presence of liver abscesses on CT.  Cultures obtained positive for Enterobacter and Klebsiella inpatient initiated on cefepime, vancomycin, and metronidazole per ID recommendations.  At time of bedside assessment, patient lying in bed in no acute distress only complaining of mild abdominal discomfort but otherwise reports feeling well.  Patient resumed on antibiotics and currently awaiting further evaluation/recommendations from IR and hepatology.  Additional history as noted below at time of transfer from [place] physician.      43-year-old male with a history of hyperlipidemia admitted to [facility] on June 7 with fever and abdominal pain.  CT imaging was concerning for abnormalities in the liver (possible mass versus abscess).  Radiology biopsied the liver (no malignant cells noted on pathology) on June 7.  Cultures from the biopsy are growing Klebsiella, and blood cultures are growing Klebsiella.  The Radiology procedure note mentions a drain being left in place, but the referring provider noted a drain was not left in place. He was seen by Infectious Diseases, and antibiotics were adjusted.  He continued to have fever, and his antibiotics were adjusted.  On repeat imaging, liver lesions appeared to be enlarging.  He was evaluated by Radiology at [facility], and it was felt that there was not percutaneous  access available.  He remains on cefepime, metronidazole, and vancomycin.  Mentation is intact and he is hemodynamically stable.  Requesting transfer to [facility] for Interventional Radiology evaluation of drain placement into the hepatic abscess.  After discussion of the options of a, and go procedure versus transfer, the safest course appears to be transfer to Hospital Medicine at [facility] for Interventional Radiology procedure on the liver abscesses.  If the patient tolerates the procedure well and is stable for a period of time after, could potentially transfer back to [facility] for further treatment.  Requesting transfer to Hospital Medicine at [facility] for potential interventional radiology procedure on June 12.  As far as IR drain management once the patient is transferred back, there will need to be a provider who can manage the drain and remove it at [facility] when it is no longer needed (Radiology or other provider).     June 11: Sodium 137, potassium 3.6, chloride 104, CO2 24, BUN 6.4, creatinine 0.68, glucose 130, calcium 8.6, albumin 2.1, AST 74, ALT 93, total bilirubin 1.8     Nazia 10: Blood cultures collected  -sodium 135, potassium 3.9, chloride 105, CO2 21, BUN 8.2, creatinine 0.66, glucose 129, total bilirubin 1.8, AST 45, ALT 71  -CT abdomen and pelvis with contrast showed enlarging liver lesions.  She had multiple liver lesions seen.  Findings are consistent with enlarging liver abscesses.  Interval development of bibasilar atelectasis and small effusions.     June 9: White blood cells 11.31, hemoglobin 12.5, hematocrit 37.5, platelets 130     June 8: Hepatitis-B and C serologies were nonreactive     June 7: Blood cultures with Klebsiella pneumoniae  -aerobic culture from liver tissue has Klebsiella pneumoniae  -COVID negative, influenza negative  -chest x-ray had no acute findings.  -CT abdomen and pelvis with IV contrast showed hypoattenuating liver lesions  could relate to metastatic disease or infection.  There are 3 hypoattenuating liver lesions with the largest in the caudate measuring up to about 5 cm.  -CT-guided right hepatic abscess biopsy (no rain placed)    PCP: Jes, Primary Doctor      Overview/Hospital Course:  The patient is a 43-year-old  male with hx HLD-on fish oil transferred from [facility] and admitted to [facility] for liver abscesses with culture growing Klebsiella and Klebsiella bacteremia on IV Rocephin. Afebrile today. KARINA Drain noted to left abdomen, CDI and draining without occlusions. Abscess fluid studies pending. Blood cultures shows NGTD. Reporting chest pain. Cardiac workup ordered: echo pending, troponin <0.0006, and EKG results shows RBBB with T wave inversions. PT will discharge home on IV Rocephin for 3 weeks per ID recommendation when medically stable.    Interval History: Pt sitting up on the side of the bed with family at the bedside. Reports feeling much better and afebrile today. Reporting mild bloating and chest pain. Cardiac workup shows echo pending, troponin <0.0006, and EKG results shows RBBB with T wave inversions. Discussed with patient and family plans to discharge home with PICC line and IV abx for 3 weeks.    Review of Systems   Constitutional:  Positive for fatigue. Negative for activity change, chills and fever.   HENT:  Negative for congestion, rhinorrhea and sore throat.    Eyes:  Negative for photophobia and visual disturbance.   Respiratory:  Negative for cough, chest tightness and shortness of breath.    Cardiovascular:  Positive for chest pain.   Gastrointestinal:  Positive for abdominal distention and abdominal pain. Negative for constipation, diarrhea, nausea and vomiting.   Endocrine: Negative for polydipsia, polyphagia and polyuria.   Genitourinary:  Negative for difficulty urinating and dysuria.   Musculoskeletal:  Negative for arthralgias, gait problem and myalgias.   Skin:         Incision to left  abdomen with drain   Allergic/Immunologic: Negative for environmental allergies.   Neurological:  Negative for syncope, weakness, numbness and headaches.   Hematological:  Does not bruise/bleed easily.   Psychiatric/Behavioral:  Negative for agitation and confusion. The patient is nervous/anxious.      Objective:     Vital Signs (Most Recent):  Temp: 99.1 °F (37.3 °C) (06/13/24 1638)  Pulse: 81 (06/13/24 1656)  Resp: 18 (06/13/24 1638)  BP: (!) 170/90 (NP notified) (06/13/24 1656)  SpO2: 96 % (06/13/24 1656) Vital Signs (24h Range):  Temp:  [98.6 °F (37 °C)-99.6 °F (37.6 °C)] 99.1 °F (37.3 °C)  Pulse:  [81-97] 81  Resp:  [16-20] 18  SpO2:  [94 %-97 %] 96 %  BP: (139-170)/(73-93) 170/90     Weight: 86 kg (189 lb 9.5 oz)  Body mass index is 33.59 kg/m².    Intake/Output Summary (Last 24 hours) at 6/13/2024 1704  Last data filed at 6/13/2024 1617  Gross per 24 hour   Intake 1798.17 ml   Output 20 ml   Net 1778.17 ml         Physical Exam  Vitals and nursing note reviewed.   Constitutional:       Appearance: Normal appearance. He is not ill-appearing or toxic-appearing.   HENT:      Head: Normocephalic and atraumatic.      Right Ear: External ear normal.      Left Ear: External ear normal.      Nose: Nose normal. No congestion or rhinorrhea.      Mouth/Throat:      Mouth: Mucous membranes are moist.      Pharynx: No posterior oropharyngeal erythema.   Eyes:      Extraocular Movements: Extraocular movements intact.      Conjunctiva/sclera: Conjunctivae normal.      Pupils: Pupils are equal, round, and reactive to light.   Neck:      Vascular: No carotid bruit.   Cardiovascular:      Rate and Rhythm: Normal rate. Rhythm irregular.      Pulses: Normal pulses.      Heart sounds: Normal heart sounds. No murmur heard.     No friction rub.   Pulmonary:      Effort: Pulmonary effort is normal. No respiratory distress.      Breath sounds: Normal breath sounds. No wheezing.   Abdominal:      General: Bowel sounds are normal.  "There is distension.      Palpations: Abdomen is soft.      Tenderness: There is abdominal tenderness.      Comments: Mildly distended  Mild TTP due to drain placement   Musculoskeletal:         General: Normal range of motion.      Cervical back: Normal range of motion.      Right lower leg: No edema.      Left lower leg: No edema.   Skin:     General: Skin is warm and dry.      Findings: No erythema or rash.      Comments: Incision to left abdomen with drain    Neurological:      General: No focal deficit present.      Mental Status: He is alert.      Sensory: No sensory deficit.      Coordination: Coordination normal.      Gait: Gait normal.   Psychiatric:         Mood and Affect: Mood normal.         Behavior: Behavior normal.         Thought Content: Thought content normal.         Judgment: Judgment normal.             Significant Labs: All pertinent labs within the past 24 hours have been reviewed.    Significant Imaging: I have reviewed all pertinent imaging results/findings within the past 24 hours.    Assessment/Plan:      * Hepatic abscess  Cont IV Rocephin  Previous culture grew Klebsiella   S/p I&D 06/12/2024, Drain in place  Hepatology consulted and following  Abscess fluid studies pending  Blood cultures NGTD      Sepsis  This patient does have evidence of infective focus  My overall impression is sepsis.  Source:  bacteremia and Liver abscess   Antibiotics given-   Antibiotics (72h ago, onward)      Start     Stop Route Frequency Ordered    06/13/24 0800  cefTRIAXone (ROCEPHIN) 2 g in dextrose 5 % in water (D5W) 100 mL IVPB (MB+)         -- IV Every 24 hours (non-standard times) 06/13/24 0459          Latest lactate reviewed-  No results for input(s): "LACTATE", "POCLAC" in the last 72 hours.  Organ dysfunction indicated by Acute liver injury    Fluid challenge Not needed - patient is not hypotensive      Post- resuscitation assessment No - Post resuscitation assessment not needed       Will Not " "start Pressors- Levophed for MAP of 65  Source control achieved by: IV Cefepime, vanco, and Flagyl    Bacteremia  Blood cultures 6/7/24 obtained at outside facility positive for Klebsiella.   Repeat blood cultures 6/10/24 show NGTD  Patient followed by infectious disease and currently on IV Rocephin.  Plan:  -continue antibiotics  -f/u repeat cultures        Transaminitis  Transaminitis likely 2/2 sepsis/Liver abscesses   Hepatology following      Hypertension  Chronic, uncontrolled. Latest blood pressure and vitals reviewed-     Temp:  [98.6 °F (37 °C)-99.6 °F (37.6 °C)]   Pulse:  [81-94]   Resp:  [16-20]   BP: (139-170)/(73-93)   SpO2:  [94 %-97 %] .   Home meds for hypertension were reviewed and noted below.     While in the hospital, will manage blood pressure as follows;    Will utilize p.r.n. blood pressure medication only if patient's blood pressure greater than 160/100 and he develops symptoms such as worsening chest pain or shortness of breath.      Hyperbilirubinemia  likely 2/2 sepsis/Liver abscesses   Hepatology consulted and following      Class 1 obesity due to excess calories with body mass index (BMI) of 33.0 to 33.9 in adult  Body mass index is 33.59 kg/m². Morbid obesity complicates all aspects of disease management from diagnostic modalities to treatment. Weight loss encouraged and health benefits explained to patient.       GERD (gastroesophageal reflux disease)  Chronic. Stable. Currently asymptomatic. Home medications include PPI/Antacids as needed.  Plan:  -Continue PPI/Antacids as needed       HLD (hyperlipidemia)  Patient is chronically on statin.will not continue for now. Last Lipid Panel: No results found for: "CHOL", "HDL", "LDLCALC", "TRIG", "CHOLHDL"  Plan:  -Continue home medication  -low fat/low calorie diet        VTE Risk Mitigation (From admission, onward)           Ordered     Reason for No Pharmacological VTE Prophylaxis  Once        Comments: Planned surgical procedure "   Question:  Reasons:  Answer:  Physician Provided (leave comment)    06/11/24 2105     IP VTE HIGH RISK PATIENT  Once         06/11/24 2105     Place sequential compression device  Until discontinued         06/11/24 2105                    Discharge Planning   CORY:      Code Status: Full Code   Is the patient medically ready for discharge?:     Reason for patient still in hospital (select all that apply): Patient trending condition  Discharge Plan A: Home                  VENITA Field  Department of Hospital Medicine   [facility] - Telemetry ([facility])

## 2024-06-13 NOTE — ASSESSMENT & PLAN NOTE
Chronic, uncontrolled. Latest blood pressure and vitals reviewed-     Temp:  [98.6 °F (37 °C)-99.6 °F (37.6 °C)]   Pulse:  [81-94]   Resp:  [16-20]   BP: (139-170)/(73-93)   SpO2:  [94 %-97 %] .   Home meds for hypertension were reviewed and noted below.     While in the hospital, will manage blood pressure as follows;    Will utilize p.r.n. blood pressure medication only if patient's blood pressure greater than 160/100 and he develops symptoms such as worsening chest pain or shortness of breath.

## 2024-06-13 NOTE — PROGRESS NOTES
[facility] - Telemetry [facility])  Hepatology  Progress Note    Patient Name: Nba Montoya  MRN: [id number]  Admission Date: 6/11/2024  Hospital Length of Stay: 2 days  Attending Provider: Christian Vasquez MD   Primary Care Physician: Jes, Primary Doctor  Principal Problem:Liver abscess    Subjective:     Transplant status: No    HPI: The patient is a 43-year-old transferred from [facility] and admitted to [facility] for  Liver abscesses with culture growing Klebsiella and Klebsiella bacteremia on IV Cefepime, Flagyl, and Vanco. He has been not feeling well for 2 weeks with pain and fever. No history of chronic liver disease.  CT abd on 6/10 showed enlarging abscesses with elevated liver enzymes.     Interval History: s/p IR drain    Current Facility-Administered Medications   Medication    0.9%  NaCl infusion    acetaminophen suppository 650 mg    acetaminophen tablet 650 mg    aluminum-magnesium hydroxide-simethicone 200-200-20 mg/5 mL suspension 30 mL    cefTRIAXone (ROCEPHIN) 2 g in dextrose 5 % in water (D5W) 100 mL IVPB (MB+)    dextrose 10% bolus 125 mL 125 mL    dextrose 10% bolus 250 mL 250 mL    glucagon (human recombinant) injection 1 mg    glucose chewable tablet 16 g    glucose chewable tablet 24 g    HYDROcodone-acetaminophen 5-325 mg per tablet 1 tablet    melatonin tablet 6 mg    morphine injection 2 mg    naloxone 0.4 mg/mL injection 0.02 mg    ondansetron injection 4 mg    polyethylene glycol packet 17 g    promethazine tablet 25 mg    simethicone chewable tablet 80 mg    sodium chloride 0.9% flush 10 mL    And    sodium chloride 0.9% flush 10 mL    sodium chloride 0.9% flush 3 mL       Objective:     Vital Signs (Most Recent):  Temp: 98.6 °F (37 °C) (06/13/24 0754)  Pulse: 88 (06/13/24 0939)  Resp: 20 (06/13/24 0754)  BP: (!) 141/86 (06/13/24 0754)  SpO2: 95 % (06/13/24 0754) Vital Signs (24h Range):  Temp:  [98.6 °F (37 °C)-99.9 °F (37.7 °C)] 98.6 °F (37 °C)  Pulse:  [74-97] 88  Resp:  [16-20]  20  SpO2:  [94 %-97 %] 95 %  BP: (139-159)/(73-86) 141/86     Weight: 86 kg (189 lb 9.5 oz) (06/11/24 3806)  Body mass index is 33.59 kg/m².    Physical Exam  Vitals and nursing note reviewed.   Constitutional:       General: He is not in acute distress.     Appearance: He is well-developed. He is not diaphoretic.   HENT:      Head: Normocephalic and atraumatic.      Nose: Nose normal.   Eyes:      General: No scleral icterus.     Conjunctiva/sclera: Conjunctivae normal.   Cardiovascular:      Rate and Rhythm: Normal rate and regular rhythm.       Pulmonary:      Effort: Pulmonary effort is normal. No respiratory distress.      Breath sounds: Normal breath sounds.  Chest:      Chest wall: No tenderness.   Abdominal:      General: Bowel sounds are normal. There is distension (mild).      Drain in place  Musculoskeletal:         General: No tenderness or deformity. Normal range of motion.      Cervical back: Normal range of motion and neck supple.   Skin:     General: Skin is warm and dry.      Coloration: Skin is not pale.      Findings: No erythema or rash.   Neurological:      Mental Status: He is alert and oriented to person, place, and time.      Cranial Nerves: No cranial nerve deficit.      Motor: No abnormal muscle tone.      Coordination: Coordination normal.      Deep Tendon Reflexes: Reflexes are normal and symmetric.   Psychiatric:         Behavior: Behavior normal.         Thought Content: Thought content normal.               Significant Labs:  CBC:   Recent Labs   Lab 06/13/24 0425   WBC 16.82*   RBC 3.95*   HGB 11.9*   HCT 35.3*   *     CMP:   Recent Labs   Lab 06/13/24 0425      CALCIUM 8.7   ALBUMIN 2.2*   PROT 6.7   *   K 3.8   CO2 23      BUN 10   CREATININE 0.7   ALKPHOS 211*   ALT 70*   AST 48*   BILITOT 1.4*     Coagulation:   Recent Labs   Lab 06/13/24 0425   INR 1.1       Significant Imaging:  CT: Reviewed  abscess    Assessment/Plan:     Active Diagnoses:     Diagnosis Date Noted POA    PRINCIPAL PROBLEM:  Liver abscess-Klebsiella [K75.0] 06/11/2024 Yes    HLD (hyperlipidemia) [E78.5] 06/12/2024 Yes     Chronic    GERD (gastroesophageal reflux disease) [K21.9] 06/12/2024 Yes     Chronic    Class 1 obesity due to excess calories with body mass index (BMI) of 33.0 to 33.9 in adult [E66.09, Z68.33] 06/12/2024 Not Applicable     Chronic    Bacteremia [R78.81] 06/12/2024 Yes    Transaminitis [R74.01] 06/12/2024 Yes    Hyperbilirubinemia [E80.6] 06/12/2024 Yes    Hypertension [I10] 06/12/2024 Yes    Sepsis [A41.9] 06/12/2024 Yes      Problems Resolved During this Admission:       Liver abscess      Likely source colon considering the cultures  S/p IR drain  Plan  Cont Abx Cefepime, Vanco, and Flagyl as per ID   No active management from Hepatology       Transaminitis           Improving  Transaminitis likely 2/2 sepsis/Liver abscesses         Bacteremia  Plan as per Primary    The patient location is: ([place])  The chief complaint leading to consultation is:  Abnormal Imaging     Visit type: audiovisual     Face to Face time with patient: 25     40 minutes of total time spent on the encounter, which includes face to face time and non-face to face time preparing to see the patient (eg, review of tests), Obtaining and/or reviewing separately obtained history, Documenting clinical information in the electronic or other health record, Independently interpreting results (not separately reported) and communicating results to the patient/family/caregiver, or Care coordination (not separately reported).     Each patient to whom he or she provides medical services by telemedicine is:  (1) informed of the relationship between the physician and patient and the respective role of any other health care provider with respect to management of the patient; and (2) notified that he or she may decline to receive medical services by telemedicine and may withdraw from such care at any  time.      Thank you for your consult. I will sign off. Please contact us if you have any additional questions.    Trace Fernández MD  Hepatology  [facility] - Telemetry ([facility])

## 2024-06-13 NOTE — CONSULTS
[facility])  Infectious Disease  Consult Note    Patient Name: Nba Montoya  MRN: [id number]  Admission Date: 6/11/2024  Hospital Length of Stay: 2 days  Attending Physician: Da Castle MD  Primary Care Provider: No, Primary Doctor     Isolation Status: No active isolations    Patient information was obtained from patient, relative(s), past medical records, and ER records.      Consults  Assessment/Plan:     Cardiac/Vascular  Hypertension  Follow primary team    ID  Bacteremia  See plan above under liver abscess-   Isolate is Klebsiella- this can cause metastatic infection- needs close follow up  Will use Rocephin    Endocrine  Class 1 obesity due to excess calories with body mass index (BMI) of 33.0 to 33.9 in adult  Follow primary team /PCP    GI  * Liver abscess-Klebsiella  Will switch to Rocephin  Follow IR drainage.  Will plan to treat for up to 3 weeks  Outpatient Antibiotic Therapy Plan:     Please send referral to [facility].     1) Infection:  Liver abscess     2) Discharge Antibiotics:     Intravenous antibiotics:IV Rocephin 2 gram daily      3) Therapy Duration:     3 weeks   Estimated end date of IV antibiotics:    07/05/24  4) Outpatient Weekly Labs:     Order the following labs to be drawn on Mondays:   CBC  CMP   CPK (when on Daptomycin)  ESR  CRP     Please send all labs to [facility] .    5) Outpatient Infectious Diseases Follow-up         Thank you for your consult. I will follow-up with patient. Please contact us if you have any additional questions.    Nelson Dugan MD, [facility]  Infectious Disease  [facility])    Subjective:     Principal Problem: Liver abscess    HPI:  43 y.o. male who was transferred  from [facility] for higher level of care -for liver abscess .   after patient was found to have presence of liver abscesses on CT.  Cultures obtained positive for .  He was initially started on   cefepime, vancomycin, and metronidazole    CT guided biopsy -06/07- Klebsiella  Labs and imaging test  Blood culture- o6/07- Klebsiella     CT -06/10-There are multiple hypoattenuated liver lesions seen which was seen on the prior examination as well.  Liver lesion is seen in the dome of the liver on image 30 series 2.  It measures 4.3 x 3.7 cm.  This area measured 3 cm x 2.7 cm on the prior examination.  Another lesion is seen in the dome of the liver in segment 8 medially.  It measures 3.7 x 3.2 cm.  Previously this area measured 3.2 x 2.6 cm.  There is a large hypoattenuated heterogeneous area seen in the caudate that measures 6.7 x 7 cm.  Previously this area measured 5 cm x 5.3 cm.  These areas were shown to be liver abscesses on prior examination.  Findings are consistent with progression.   CBC -wbc 14    He was seen by IR and had -CT guided drain of a caudate lobe abscess. -06/12    No past medical history on file.    No past surgical history on file.    Review of patient's allergies indicates:  No Known Allergies    Medications:  Medications Prior to Admission   Medication Sig    dextrose 5 % in water (D5W) PgBk 100 mL with ceFEPIme 2 gram SolR 2 g Inject 2 g into the vein every 8 (eight) hours.    enoxaparin (LOVENOX) 40 mg/0.4 mL Syrg Inject 0.4 mLs (40 mg total) into the skin once daily.    metroNIDAZOLE (FLAGYL) 500 MG tablet Take 1 tablet (500 mg total) by mouth every 8 (eight) hours.    omeprazole (PRILOSEC) 40 MG capsule Take 40 mg by mouth once daily.    ondansetron (ZOFRAN) 4 mg/5 mL solution Take 8 mg by mouth 3 (three) times daily as needed for Nausea.    sucralfate (CARAFATE) 1 gram tablet Take 1 g by mouth 4 (four) times daily.     Antibiotics (From admission, onward)      Start     Stop Route Frequency Ordered    06/13/24 0600  cefTRIAXone (ROCEPHIN) 2 g in dextrose 5 % in water (D5W) 100 mL IVPB (MB+)         -- IV Every 24 hours (non-standard times) 06/13/24 4889          Antifungals (From admission, onward)      None       "    Antivirals (From admission, onward)      None               There is no immunization history on file for this patient.    Family History    None       Social History     Socioeconomic History    Marital status:      Review of Systems   Constitutional:  Negative for activity change, appetite change, chills and diaphoresis.   Respiratory:  Negative for apnea and chest tightness.    Cardiovascular:  Negative for chest pain and leg swelling.   Hematological:  Negative for adenopathy. Does not bruise/bleed easily.   Psychiatric/Behavioral:  Negative for agitation and behavioral problems.      Objective:     Vital Signs (Most Recent):  Temp: 99.6 °F (37.6 °C) (06/13/24 0322)  Pulse: 86 (06/13/24 0441)  Resp: 17 (06/13/24 0322)  BP: (!) 159/83 (06/13/24 0322)  SpO2: (!) 94 % (06/13/24 0322) Vital Signs (24h Range):  Temp:  [98.7 °F (37.1 °C)-99.9 °F (37.7 °C)] 99.6 °F (37.6 °C)  Pulse:  [71-97] 86  Resp:  [14-20] 17  SpO2:  [94 %-100 %] 94 %  BP: (128-174)/(73-94) 159/83     Weight: 86 kg (189 lb 9.5 oz)  Body mass index is 33.59 kg/m².    Estimated Creatinine Clearance: 132.9 mL/min (A) (based on SCr of 0.68 mg/dL (L)).     Physical Exam  Vitals and nursing note reviewed.   Constitutional:       Appearance: Normal appearance.   HENT:      Head: Normocephalic.      Right Ear: Tympanic membrane normal.   Cardiovascular:      Rate and Rhythm: Normal rate.   Pulmonary:      Effort: Pulmonary effort is normal.   Abdominal:      General: Abdomen is flat.   Musculoskeletal:      Cervical back: Normal range of motion.   Neurological:      General: No focal deficit present.      Mental Status: He is alert. Mental status is at baseline.          Significant Labs: Blood Culture: No results for input(s): "LABBLOO" in the last 4320 hours.  BMP: No results for input(s): "GLU", "NA", "K", "CL", "CO2", "BUN", "CREATININE", "CALCIUM", "MG" in the last 48 hours.  CBC:   Recent Labs   Lab 06/11/24  0656   WBC 14.84*   HGB 12.7* " "  HCT 38.6*        CMP: No results for input(s): "NA", "K", "CL", "CO2", "GLU", "BUN", "CREATININE", "CALCIUM", "PROT", "ALBUMIN", "BILITOT", "ALKPHOS", "AST", "ALT", "ANIONGAP", "EGFRNONAA" in the last 48 hours.    Invalid input(s): "ESTGFAFRICA"  Wound Culture: No results for input(s): "LABAERO" in the last 4320 hours.  All pertinent labs within the past 24 hours have been reviewed.    Significant Imaging: I have reviewed all pertinent imaging results/findings within the past 24 hours.              "

## 2024-06-13 NOTE — NURSING
"Patient stated he had some chest pain that lasted "about a minute earlier". He said it went away after he drank some water and lied down. NP notified. EKG ordered.   "

## 2024-06-13 NOTE — ASSESSMENT & PLAN NOTE
Will switch to Rocephin  Follow IR drainage.  Will plan to treat for up to 3 weeks  Outpatient Antibiotic Therapy Plan:     Please send referral to [facility].     1) Infection:  Liver abscess     2) Discharge Antibiotics:     Intravenous antibiotics:IV Rocephin 2 gram daily      3) Therapy Duration:     3 weeks   Estimated end date of IV antibiotics:    07/05/24  4) Outpatient Weekly Labs:     Order the following labs to be drawn on Mondays:   CBC  CMP   CPK (when on Daptomycin)  ESR  CRP     Please send all labs to [facility] .    5) Outpatient Infectious Diseases Follow-up

## 2024-06-13 NOTE — SUBJECTIVE & OBJECTIVE
No past medical history on file.    No past surgical history on file.    Review of patient's allergies indicates:  No Known Allergies    Medications:  Medications Prior to Admission   Medication Sig    dextrose 5 % in water (D5W) PgBk 100 mL with ceFEPIme 2 gram SolR 2 g Inject 2 g into the vein every 8 (eight) hours.    enoxaparin (LOVENOX) 40 mg/0.4 mL Syrg Inject 0.4 mLs (40 mg total) into the skin once daily.    metroNIDAZOLE (FLAGYL) 500 MG tablet Take 1 tablet (500 mg total) by mouth every 8 (eight) hours.    omeprazole (PRILOSEC) 40 MG capsule Take 40 mg by mouth once daily.    ondansetron (ZOFRAN) 4 mg/5 mL solution Take 8 mg by mouth 3 (three) times daily as needed for Nausea.    sucralfate (CARAFATE) 1 gram tablet Take 1 g by mouth 4 (four) times daily.     Antibiotics (From admission, onward)      Start     Stop Route Frequency Ordered    06/13/24 0600  cefTRIAXone (ROCEPHIN) 2 g in dextrose 5 % in water (D5W) 100 mL IVPB (MB+)         -- IV Every 24 hours (non-standard times) 06/13/24 0459          Antifungals (From admission, onward)      None          Antivirals (From admission, onward)      None               There is no immunization history on file for this patient.    Family History    None       Social History     Socioeconomic History    Marital status:      Review of Systems   Constitutional:  Negative for activity change, appetite change, chills and diaphoresis.   Respiratory:  Negative for apnea and chest tightness.    Cardiovascular:  Negative for chest pain and leg swelling.   Hematological:  Negative for adenopathy. Does not bruise/bleed easily.   Psychiatric/Behavioral:  Negative for agitation and behavioral problems.      Objective:     Vital Signs (Most Recent):  Temp: 99.6 °F (37.6 °C) (06/13/24 0322)  Pulse: 86 (06/13/24 0441)  Resp: 17 (06/13/24 0322)  BP: (!) 159/83 (06/13/24 0322)  SpO2: (!) 94 % (06/13/24 0322) Vital Signs (24h Range):  Temp:  [98.7 °F (37.1 °C)-99.9 °F (37.7  "°C)] 99.6 °F (37.6 °C)  Pulse:  [71-97] 86  Resp:  [14-20] 17  SpO2:  [94 %-100 %] 94 %  BP: (128-174)/(73-94) 159/83     Weight: 86 kg (189 lb 9.5 oz)  Body mass index is 33.59 kg/m².    Estimated Creatinine Clearance: 132.9 mL/min (A) (based on SCr of 0.68 mg/dL (L)).     Physical Exam  Vitals and nursing note reviewed.   Constitutional:       Appearance: Normal appearance.   HENT:      Head: Normocephalic.      Right Ear: Tympanic membrane normal.   Cardiovascular:      Rate and Rhythm: Normal rate.   Pulmonary:      Effort: Pulmonary effort is normal.   Abdominal:      General: Abdomen is flat.   Musculoskeletal:      Cervical back: Normal range of motion.   Neurological:      General: No focal deficit present.      Mental Status: He is alert. Mental status is at baseline.          Significant Labs: Blood Culture: No results for input(s): "LABBLOO" in the last 4320 hours.  BMP: No results for input(s): "GLU", "NA", "K", "CL", "CO2", "BUN", "CREATININE", "CALCIUM", "MG" in the last 48 hours.  CBC:   Recent Labs   Lab 06/11/24  0656   WBC 14.84*   HGB 12.7*   HCT 38.6*        CMP: No results for input(s): "NA", "K", "CL", "CO2", "GLU", "BUN", "CREATININE", "CALCIUM", "PROT", "ALBUMIN", "BILITOT", "ALKPHOS", "AST", "ALT", "ANIONGAP", "EGFRNONAA" in the last 48 hours.    Invalid input(s): "ESTGFAFRICA"  Wound Culture: No results for input(s): "LABAERO" in the last 4320 hours.  All pertinent labs within the past 24 hours have been reviewed.    Significant Imaging: I have reviewed all pertinent imaging results/findings within the past 24 hours.  "

## 2024-06-14 VITALS
BODY MASS INDEX: 33.66 KG/M2 | TEMPERATURE: 99 F | DIASTOLIC BLOOD PRESSURE: 92 MMHG | HEIGHT: 63 IN | OXYGEN SATURATION: 95 % | HEART RATE: 96 BPM | WEIGHT: 190 LBS | RESPIRATION RATE: 18 BRPM | SYSTOLIC BLOOD PRESSURE: 142 MMHG

## 2024-06-14 PROBLEM — B96.1 BACTEREMIA DUE TO KLEBSIELLA PNEUMONIAE: Status: ACTIVE | Noted: 2024-06-12

## 2024-06-14 LAB
ALBUMIN SERPL BCP-MCNC: 2.4 G/DL (ref 3.5–5.2)
ALP SERPL-CCNC: 200 U/L (ref 55–135)
ALT SERPL W/O P-5'-P-CCNC: 64 U/L (ref 10–44)
ANION GAP SERPL CALC-SCNC: 9 MMOL/L (ref 8–16)
AORTIC ROOT ANNULUS: 3.48 CM
ASCENDING AORTA: 3.62 CM
AST SERPL-CCNC: 45 U/L (ref 10–40)
AV INDEX (PROSTH): 0.75
AV MEAN GRADIENT: 8 MMHG
AV PEAK GRADIENT: 16 MMHG
AV REGURGITATION PRESSURE HALF TIME: 606.2 MS
AV VALVE AREA BY VELOCITY RATIO: 2.13 CM²
AV VALVE AREA: 2.21 CM²
AV VELOCITY RATIO: 0.73
BASOPHILS # BLD AUTO: 0.03 K/UL (ref 0–0.2)
BASOPHILS NFR BLD: 0.2 % (ref 0–1.9)
BILIRUB SERPL-MCNC: 1 MG/DL (ref 0.1–1)
BSA FOR ECHO PROCEDURE: 1.96 M2
BUN SERPL-MCNC: 8 MG/DL (ref 6–20)
CALCIUM SERPL-MCNC: 8.8 MG/DL (ref 8.7–10.5)
CHLORIDE SERPL-SCNC: 105 MMOL/L (ref 95–110)
CO2 SERPL-SCNC: 22 MMOL/L (ref 23–29)
CREAT SERPL-MCNC: 0.7 MG/DL (ref 0.5–1.4)
CV ECHO LV RWT: 0.58 CM
DIFFERENTIAL METHOD BLD: ABNORMAL
DOP CALC AO PEAK VEL: 2.02 M/S
DOP CALC AO VTI: 38.7 CM
DOP CALC LVOT AREA: 2.9 CM2
DOP CALC LVOT DIAMETER: 1.93 CM
DOP CALC LVOT PEAK VEL: 1.47 M/S
DOP CALC LVOT STROKE VOLUME: 85.38 CM3
DOP CALC RVOT PEAK VEL: 0.95 M/S
DOP CALC RVOT VTI: 17.7 CM
DOP CALCLVOT PEAK VEL VTI: 29.2 CM
E WAVE DECELERATION TIME: 248.71 MSEC
E/A RATIO: 1.15
E/E' RATIO: 6.46 M/S
ECHO LV POSTERIOR WALL: 1.2 CM (ref 0.6–1.1)
EOSINOPHIL # BLD AUTO: 0.1 K/UL (ref 0–0.5)
EOSINOPHIL NFR BLD: 0.6 % (ref 0–8)
ERYTHROCYTE [DISTWIDTH] IN BLOOD BY AUTOMATED COUNT: 14.4 % (ref 11.5–14.5)
EST. GFR  (NO RACE VARIABLE): >60 ML/MIN/1.73 M^2
FRACTIONAL SHORTENING: 34 % (ref 28–44)
GLUCOSE SERPL-MCNC: 148 MG/DL (ref 70–110)
HCT VFR BLD AUTO: 37 % (ref 40–54)
HGB BLD-MCNC: 12.4 G/DL (ref 14–18)
IMM GRANULOCYTES # BLD AUTO: 0.16 K/UL (ref 0–0.04)
IMM GRANULOCYTES NFR BLD AUTO: 1.3 % (ref 0–0.5)
INTERVENTRICULAR SEPTUM: 1.06 CM (ref 0.6–1.1)
IVC DIAMETER: 1.49 CM
IVRT: 85.63 MSEC
LA MAJOR: 5.43 CM
LA MINOR: 5.63 CM
LA WIDTH: 3.5 CM
LEFT ATRIUM SIZE: 3.02 CM
LEFT ATRIUM VOLUME INDEX: 26.3 ML/M2
LEFT ATRIUM VOLUME: 49.67 CM3
LEFT INTERNAL DIMENSION IN SYSTOLE: 2.71 CM (ref 2.1–4)
LEFT VENTRICLE DIASTOLIC VOLUME INDEX: 39.61 ML/M2
LEFT VENTRICLE DIASTOLIC VOLUME: 74.87 ML
LEFT VENTRICLE MASS INDEX: 84 G/M2
LEFT VENTRICLE SYSTOLIC VOLUME INDEX: 14.4 ML/M2
LEFT VENTRICLE SYSTOLIC VOLUME: 27.29 ML
LEFT VENTRICULAR INTERNAL DIMENSION IN DIASTOLE: 4.12 CM (ref 3.5–6)
LEFT VENTRICULAR MASS: 158.48 G
LV LATERAL E/E' RATIO: 6 M/S
LV SEPTAL E/E' RATIO: 7 M/S
LVOT MG: 4.74 MMHG
LVOT MV: 1.03 CM/S
LYMPHOCYTES # BLD AUTO: 1.7 K/UL (ref 1–4.8)
LYMPHOCYTES NFR BLD: 13.5 % (ref 18–48)
MCH RBC QN AUTO: 29.9 PG (ref 27–31)
MCHC RBC AUTO-ENTMCNC: 33.5 G/DL (ref 32–36)
MCV RBC AUTO: 89 FL (ref 82–98)
MONOCYTES # BLD AUTO: 0.5 K/UL (ref 0.3–1)
MONOCYTES NFR BLD: 4.4 % (ref 4–15)
MV PEAK A VEL: 0.73 M/S
MV PEAK E VEL: 0.84 M/S
MV STENOSIS PRESSURE HALF TIME: 72.13 MS
MV VALVE AREA P 1/2 METHOD: 3.05 CM2
NEUTROPHILS # BLD AUTO: 9.8 K/UL (ref 1.8–7.7)
NEUTROPHILS NFR BLD: 80 % (ref 38–73)
NRBC BLD-RTO: 0 /100 WBC
PISA AR MAX VEL: 5.31 M/S
PLATELET # BLD AUTO: 575 K/UL (ref 150–450)
PMV BLD AUTO: 9.7 FL (ref 9.2–12.9)
POTASSIUM SERPL-SCNC: 3.4 MMOL/L (ref 3.5–5.1)
PROT SERPL-MCNC: 7 G/DL (ref 6–8.4)
PV MEAN GRADIENT: 2 MMHG
RA MAJOR: 3.75 CM
RA PRESSURE ESTIMATED: 3 MMHG
RA WIDTH: 3.2 CM
RBC # BLD AUTO: 4.15 M/UL (ref 4.6–6.2)
SODIUM SERPL-SCNC: 136 MMOL/L (ref 136–145)
STJ: 3.58 CM
TDI LATERAL: 0.14 M/S
TDI SEPTAL: 0.12 M/S
TDI: 0.13 M/S
TRICUSPID ANNULAR PLANE SYSTOLIC EXCURSION: 2.43 CM
TROPONIN I SERPL DL<=0.01 NG/ML-MCNC: <0.006 NG/ML (ref 0–0.03)
WBC # BLD AUTO: 12.22 K/UL (ref 3.9–12.7)
Z-SCORE OF LEFT VENTRICULAR DIMENSION IN END DIASTOLE: -2.5
Z-SCORE OF LEFT VENTRICULAR DIMENSION IN END SYSTOLE: -1.46

## 2024-06-14 PROCEDURE — 25000003 PHARM REV CODE 250: Performed by: INTERNAL MEDICINE

## 2024-06-14 PROCEDURE — 80053 COMPREHEN METABOLIC PANEL: CPT | Performed by: NURSE PRACTITIONER

## 2024-06-14 PROCEDURE — A4216 STERILE WATER/SALINE, 10 ML: HCPCS | Performed by: NURSE PRACTITIONER

## 2024-06-14 PROCEDURE — 94761 N-INVAS EAR/PLS OXIMETRY MLT: CPT

## 2024-06-14 PROCEDURE — 84484 ASSAY OF TROPONIN QUANT: CPT | Performed by: NURSE PRACTITIONER

## 2024-06-14 PROCEDURE — 63600175 PHARM REV CODE 636 W HCPCS: Performed by: INTERNAL MEDICINE

## 2024-06-14 PROCEDURE — 94799 UNLISTED PULMONARY SVC/PX: CPT

## 2024-06-14 PROCEDURE — 25000003 PHARM REV CODE 250: Performed by: NURSE PRACTITIONER

## 2024-06-14 PROCEDURE — 85025 COMPLETE CBC W/AUTO DIFF WBC: CPT | Performed by: NURSE PRACTITIONER

## 2024-06-14 RX ORDER — ONDANSETRON 4 MG/1
4 TABLET, ORALLY DISINTEGRATING ORAL 2 TIMES DAILY
Qty: 30 TABLET | Refills: 0 | Status: SHIPPED | OUTPATIENT
Start: 2024-06-14

## 2024-06-14 RX ORDER — FAMOTIDINE 20 MG/1
20 TABLET, FILM COATED ORAL 2 TIMES DAILY
Status: DISCONTINUED | OUTPATIENT
Start: 2024-06-14 | End: 2024-06-14 | Stop reason: HOSPADM

## 2024-06-14 RX ORDER — MUPIROCIN 20 MG/G
OINTMENT TOPICAL 2 TIMES DAILY
Status: DISCONTINUED | OUTPATIENT
Start: 2024-06-14 | End: 2024-06-14 | Stop reason: HOSPADM

## 2024-06-14 RX ORDER — POTASSIUM CHLORIDE 20 MEQ/1
40 TABLET, EXTENDED RELEASE ORAL ONCE
Status: COMPLETED | OUTPATIENT
Start: 2024-06-14 | End: 2024-06-14

## 2024-06-14 RX ORDER — FAMOTIDINE 20 MG/1
20 TABLET, FILM COATED ORAL 2 TIMES DAILY
Qty: 60 TABLET | Refills: 0 | Status: SHIPPED | OUTPATIENT
Start: 2024-06-14 | End: 2025-06-14

## 2024-06-14 RX ADMIN — Medication 10 ML: at 05:06

## 2024-06-14 RX ADMIN — Medication 10 ML: at 11:06

## 2024-06-14 RX ADMIN — MUPIROCIN: 20 OINTMENT TOPICAL at 09:06

## 2024-06-14 RX ADMIN — POTASSIUM CHLORIDE 40 MEQ: 1500 TABLET, EXTENDED RELEASE ORAL at 11:06

## 2024-06-14 RX ADMIN — Medication 10 ML: at 12:06

## 2024-06-14 RX ADMIN — CEFTRIAXONE 2 G: 2 INJECTION, POWDER, FOR SOLUTION INTRAMUSCULAR; INTRAVENOUS at 08:06

## 2024-06-14 NOTE — CONSULTS
[facility] - Telemetry (Hospital)  Discharge Final Note    Primary Care Provider: No, Primary Doctor    Expected Discharge Date: 6/14/2024    Final Discharge Note (most recent)       Final Note - 06/14/24 1203          Final Note    Assessment Type Final Discharge Note     Anticipated Discharge Disposition Home-Health Care [facility]     Hospital Resources/Appts/Education Provided Post-Acute resouces added to AVS;Appointments scheduled and added to AVS        Post-Acute Status    Post-Acute Authorization Home Health;IV Infusion     Home Health Status Set-up Complete/Auth obtained     IV Infusion Status Set-up Complete/Auth obtained     Discharge Delays None known at this time                   Pt to discharge home today.   [facility] completed bedside teach yesterday; home IVABX medication delivered to bedside.  [facility] accepting for home health; AVS updated.    DANIELA arranged PCP appointment; scheduled on AVS: Hospital Follow Up with Ирина Collins, Thursday Jun 20, 2024 12:40 PM.     Family later informed CM of pt's PCP: Huma Saleh DO @ [phone]. DANIELA updated family that office is closed early on Friday and family will need to arrange f/u on Monday. Spouse voiced understanding.     DANIELA sent in-basket message to Dr. Edgar Petersen (ID) staff to assist with arranging hospital follow up; appt scheduled on AVS: New Patient with Georgette CHRIS Olivo, Monday Jul 8, 2024 9:30 AM    DANIELA discussed the above with pt and spouse at bedside; all questions answered at this time.  No additional needs for discharge.       Important Message from Medicare             Contact Info       [facility]   Specialty: Home Health Services, Home Therapy Services, Home Living Aide Services    [address] [place] [address]   Phone: [phone]       Next Steps: Follow up    Instructions: Home Health    [facility]        Next Steps: Follow up    Instructions: HOME INFUSION    No, Primary  Doctor   Relationship: PCP - General        Next Steps: Follow up in 3 day(s)    Instructions: Will need to follow up with Hepatology in  3-4 weeks  Will need to follow up with Infectious disase in 3 weeks

## 2024-06-14 NOTE — PROGRESS NOTES
[facility])  Infectious Disease  Progress Note    Patient Name: Nba Montoya  MRN: [id number]  Admission Date: 6/11/2024  Length of Stay: 3 days  Attending Physician: Christian Vasquez MD  Primary Care Provider: No, Primary Doctor    Isolation Status: No active isolations  Assessment/Plan:      Cardiac/Vascular  Hypertension  Follow primary team    ID  Bacteremia  See plan above under liver abscess-   Isolate is Klebsiella- this can cause metastatic infection- needs close follow up  Will use Rocephin    GI  * Hepatic abscess  Will switch to Rocephin  Follow IR drainage.  Will plan to treat for up to 3 weeks  Outpatient Antibiotic Therapy Plan:     Please send referral to [facility].     1) Infection:  Liver abscess     2) Discharge Antibiotics:     Intravenous antibiotics:IV Rocephin 2 gram daily      3) Therapy Duration:     3 weeks   Estimated end date of IV antibiotics:    07/05/24  4) Outpatient Weekly Labs:     Order the following labs to be drawn on Mondays:   CBC  CMP   CPK (when on Daptomycin)  ESR  CRP     Please send all labs to [facility] .    5) Outpatient Infectious Diseases Follow-up     06/13-will continue the plan above  Continue Rocephin, monitor clinical response        Anticipated Disposition:     Thank you for your consult. I will follow-up with patient. Please contact us if you have any additional questions.    Nelson Dugan MD, [facility]  Infectious Disease  [facility])    Subjective:     Principal Problem:Hepatic abscess    HPI:  43 y.o. male who was transferred  from [facility] for higher level of care -for liver abscess .   after patient was found to have presence of liver abscesses on CT.  Cultures obtained positive for .  He was initially started on   cefepime, vancomycin, and metronidazole   CT guided biopsy -06/07- Klebsiella  Labs and imaging test  Blood culture- o6/07- Klebsiella     CT -06/10-There are multiple hypoattenuated  liver lesions seen which was seen on the prior examination as well.  Liver lesion is seen in the dome of the liver on image 30 series 2.  It measures 4.3 x 3.7 cm.  This area measured 3 cm x 2.7 cm on the prior examination.  Another lesion is seen in the dome of the liver in segment 8 medially.  It measures 3.7 x 3.2 cm.  Previously this area measured 3.2 x 2.6 cm.  There is a large hypoattenuated heterogeneous area seen in the caudate that measures 6.7 x 7 cm.  Previously this area measured 5 cm x 5.3 cm.  These areas were shown to be liver abscesses on prior examination.  Findings are consistent with progression.   CBC -wbc 14    He was seen by IR and had -CT guided drain of a caudate lobe abscess. -06/12  Interval History:   43 year old man with Liver abscess/Klebsiella bacteremia .  He is tolerating meds      Review of Systems   Constitutional:  Negative for activity change, appetite change, chills, diaphoresis and fatigue.   HENT:  Negative for congestion.    Cardiovascular:  Negative for chest pain.   Musculoskeletal:  Negative for arthralgias.   Neurological:  Negative for dizziness, facial asymmetry and headaches.   Psychiatric/Behavioral:  Negative for agitation.      Objective:     Vital Signs (Most Recent):  Temp: 99.3 °F (37.4 °C) (06/14/24 0458)  Pulse: 80 (06/14/24 0458)  Resp: 17 (06/14/24 0458)  BP: (!) 163/93 (06/14/24 0458)  SpO2: 95 % (06/14/24 0458) Vital Signs (24h Range):  Temp:  [98.6 °F (37 °C)-99.5 °F (37.5 °C)] 99.3 °F (37.4 °C)  Pulse:  [] 80  Resp:  [16-20] 17  SpO2:  [94 %-97 %] 95 %  BP: (141-174)/(83-93) 163/93     Weight: 84.8 kg (186 lb 15.2 oz)  Body mass index is 33.12 kg/m².    Estimated Creatinine Clearance: 128.4 mL/min (based on SCr of 0.7 mg/dL).     Physical Exam  Vitals and nursing note reviewed.   HENT:      Head: Normocephalic.   Cardiovascular:      Rate and Rhythm: Normal rate.   Pulmonary:      Effort: Pulmonary effort is normal.   Abdominal:      General: Abdomen  "is flat.      Comments: Has KARINA drain over liver   Musculoskeletal:      Cervical back: Normal range of motion.   Neurological:      General: No focal deficit present.      Mental Status: He is alert and oriented to person, place, and time.   Psychiatric:         Mood and Affect: Mood normal.          Significant Labs: Blood Culture: No results for input(s): "LABBLOO" in the last 4320 hours.  BMP:   Recent Labs   Lab 06/13/24  0425      *   K 3.8      CO2 23   BUN 10   CREATININE 0.7   CALCIUM 8.7   MG 2.3     CBC:   Recent Labs   Lab 06/13/24 0425   WBC 16.82*   HGB 11.9*   HCT 35.3*   *     CMP:   Recent Labs   Lab 06/13/24 0425   *   K 3.8      CO2 23      BUN 10   CREATININE 0.7   CALCIUM 8.7   PROT 6.7   ALBUMIN 2.2*   BILITOT 1.4*   ALKPHOS 211*   AST 48*   ALT 70*   ANIONGAP 7*     Wound Culture: No results for input(s): "LABAERO" in the last 4320 hours.  All pertinent labs within the past 24 hours have been reviewed.    Significant Imaging: I have reviewed all pertinent imaging results/findings within the past 24 hours.  "

## 2024-06-14 NOTE — DISCHARGE SUMMARY
[facility] Medicine  Discharge Summary      Patient Name: Nba Montoya  MRN: [id number]  Banner: [id number]  Patient Class: IP- Inpatient  Admission Date: 6/11/2024  Hospital Length of Stay: 3 days  Discharge Date and Time: 6/14/2024  2:53 PM  Attending Physician: Dr. Ramirez  Discharging Provider: Cristine Petersen NP  Primary Care Provider: Jes, Primary Doctor    Primary Care Team: Networked reference to record PCT     HPI:   Nba Montoya is a 43 y.o. male with a PMH  has no past medical history on file. who presented as a transfer from [facility] for higher level of care and further evaluation by IR and hepatology after patient was found to have presence of liver abscesses on CT.  Cultures obtained positive for Enterobacter and Klebsiella inpatient initiated on cefepime, vancomycin, and metronidazole per ID recommendations.  At time of bedside assessment, patient lying in bed in no acute distress only complaining of mild abdominal discomfort but otherwise reports feeling well.  Patient resumed on antibiotics and currently awaiting further evaluation/recommendations from IR and hepatology.  Additional history as noted below at time of transfer from  physician.      43-year-old male with a history of hyperlipidemia admitted to [facility] on June 7 with fever and abdominal pain.  CT imaging was concerning for abnormalities in the liver (possible mass versus abscess).  Radiology biopsied the liver (no malignant cells noted on pathology) on June 7.  Cultures from the biopsy are growing Klebsiella, and blood cultures are growing Klebsiella.  The Radiology procedure note mentions a drain being left in place, but the referring provider noted a drain was not left in place. He was seen by Infectious Diseases, and antibiotics were adjusted.  He continued to have fever, and his antibiotics were adjusted.  On repeat imaging, liver lesions appeared to be  enlarging.  He was evaluated by Radiology at [facility], and it was felt that there was not percutaneous access available.  He remains on cefepime, metronidazole, and vancomycin.  Mentation is intact and he is hemodynamically stable.  Requesting transfer to [facility] for Interventional Radiology evaluation of drain placement into the hepatic abscess.  After discussion of the options of a, and go procedure versus transfer, the safest course appears to be transfer to Hospital Medicine at [facility] for Interventional Radiology procedure on the liver abscesses.  If the patient tolerates the procedure well and is stable for a period of time after, could potentially transfer back to [facility] for further treatment.  Requesting transfer to Hospital Medicine at [facility] for potential interventional radiology procedure on June 12.  As far as IR drain management once the patient is transferred back, there will need to be a provider who can manage the drain and remove it at [facility] when it is no longer needed (Radiology or other provider).     June 11: Sodium 137, potassium 3.6, chloride 104, CO2 24, BUN 6.4, creatinine 0.68, glucose 130, calcium 8.6, albumin 2.1, AST 74, ALT 93, total bilirubin 1.8     Nazia 10: Blood cultures collected  -sodium 135, potassium 3.9, chloride 105, CO2 21, BUN 8.2, creatinine 0.66, glucose 129, total bilirubin 1.8, AST 45, ALT 71  -CT abdomen and pelvis with contrast showed enlarging liver lesions.  She had multiple liver lesions seen.  Findings are consistent with enlarging liver abscesses.  Interval development of bibasilar atelectasis and small effusions.     June 9: White blood cells 11.31, hemoglobin 12.5, hematocrit 37.5, platelets 130     June 8: Hepatitis-B and C serologies were nonreactive     June 7: Blood cultures with Klebsiella pneumoniae  -aerobic culture from liver tissue has Klebsiella pneumoniae  -COVID negative, influenza  negative  -chest x-ray had no acute findings.  -CT abdomen and pelvis with IV contrast showed hypoattenuating liver lesions could relate to metastatic disease or infection.  There are 3 hypoattenuating liver lesions with the largest in the caudate measuring up to about 5 cm.  -CT-guided right hepatic abscess biopsy (no rain placed)    PCP: No, Primary Doctor        Hospital Course:   The patient is a 43-year-old  male with hx HLD-on fish oil transferred from [facility] and admitted to [facility] for liver abscesses with culture growing Klebsiella and Klebsiella bacteremia on IV Rocephin. Hepatology consulted an agreed with POC. LFTs trending down. ID consulted and agreed with liver abscess drain placement. He recommended IV Rocephin for 3 weeks. IR consulted and a KARINA drain was placed to left abdomen draining small amount of purulent drainage. Reported chest pain. EKG showed RBBB with T wave inversions in anterior lead. Serial troponin normal. Echo showed normal LVEF, no WMA. Chest pain resolved. CP likely GI in origin- improved with Carafate. PICC line placed. CM consulted and arranged HH and home IV infusion. Pt will f/u with ID from [facility], Dr Petersen, in 2 weeks. He will f/u with his PCP for KARINA drain management, hepatology referral, and stress test if indicated. The patient was seen and examined today and determined to be stable for discharge.           Goals of Care Treatment Preferences:  Code Status: Full Code      Consults:   Consults (From admission, onward)          Status Ordering Provider     Inpatient consult to Social Work  Once        Provider:  (Not yet assigned)    Completed ALBERTO RAMON     [facility]  Once        Provider:  (Not yet assigned)    Acknowledged ALBERTO RAMON     Inpatient consult to Social Work  Once        Provider:  (Not yet assigned)    Completed ALBERTO RAMON     Inpatient consult to Infectious Diseases  Once         Provider:  Nelson Dugan MD, ALBERTO Gallagher     Inpatient Consult to Telemedicine - Hepatology  Once        Provider:  Trace Fernández MD    Completed HI VARGAS.     Inpatient consult to Interventional Radiology  Once        Provider:  Nelson Dale Jr., MD    Completed HI VARGAS.     Inpatient consult to Interventional Radiology  Once        Provider:  Charbel Cleary PA    Completed KEM VARGAS                Final Active Diagnoses:    Diagnosis Date Noted POA    PRINCIPAL PROBLEM:  Hepatic abscess [K75.0] 06/11/2024 Yes    Bacteremia due to Klebsiella pneumoniae [R78.81, B96.1] 06/12/2024 Yes    Sepsis [A41.9] 06/12/2024 Yes    Transaminitis [R74.01] 06/12/2024 Yes    Hyperbilirubinemia [E80.6] 06/12/2024 Yes    HLD (hyperlipidemia) [E78.5] 06/12/2024 Yes     Chronic    GERD (gastroesophageal reflux disease) [K21.9] 06/12/2024 Yes     Chronic    Class 1 obesity due to excess calories with body mass index (BMI) of 33.0 to 33.9 in adult [E66.09, Z68.33] 06/12/2024 Not Applicable     Chronic    Hypertension [I10] 06/12/2024 Yes      Problems Resolved During this Admission:       Discharged Condition: stable    Disposition: IV Therapy Provider    Follow Up:   Follow-up Information       [facility] Follow up.    Specialties: Home Health Services, Home Therapy Services, Home Living Aide Services  Why: Home Health  Contact information:  [address] [facility] [address] [phone]             [facility] Follow up.    Why: HOME INFUSION             Huma Saleh DO Follow up.    Specialty: Internal Medicine  Why: Will need to follow up with Hepatology in 3-4 weeks Will need to follow up with Infectious disase in 3 weeks. F/U with CArdioogy or PCP for possible stress test in one month  Contact information:  [address]  [facility] [id number] [phone]                           Patient Instructions:       Diet Cardiac     Activity as tolerated       Significant Diagnostic Studies:      Pending Diagnostic Studies:       Procedure Component Value Units Date/Time    Cytology, Fluid/Wash/Brush [[id number]] Collected: 06/12/24 1424    Order Status: Sent Lab Status: In process Updated: 06/12/24 1457    Specimen: Body Fluid            Medications:  Reconciled Home Medications:      Medication List        START taking these medications      dextrose 5 % in water (D5W) PgBk 100 mL with cefTRIAXone 2 gram SolR 2 g  Inject 2 g into the vein every 12 (twelve) hours.     famotidine 20 MG tablet  Commonly known as: PEPCID  Take 1 tablet (20 mg total) by mouth 2 (two) times daily.     ondansetron 4 MG Tbdl  Commonly known as: ZOFRAN-ODT  Dissolve 1 tablet (4 mg total) by mouth 2 (two) times daily.            STOP taking these medications      dextrose 5 % in water (D5W) PgBk 100 mL with ceFEPIme 2 gram SolR 2 g     enoxaparin 40 mg/0.4 mL Syrg  Commonly known as: LOVENOX     metroNIDAZOLE 500 MG tablet  Commonly known as: FLAGYL     omeprazole 40 MG capsule  Commonly known as: PRILOSEC     ondansetron 4 mg/5 mL solution  Commonly known as: ZOFRAN     sucralfate 1 gram tablet  Commonly known as: CARAFATE              Indwelling Lines/Drains at time of discharge:   Lines/Drains/Airways       Peripherally Inserted Central Catheter Line  Duration             PICC Double Lumen 06/13/24 1355 right basilic 1 day              Drain  Duration                  Closed/Suction Drain 06/12/24 1431 Tube - 1 Lateral RUQ Bulb 8 Fr. 2 days                    Time spent on the discharge of patient: 45 minutes         Cristine Petersen NP  Department of Hospital Medicine  '[place] - Telemetry ([facility])

## 2024-06-14 NOTE — NURSING
Patient is ready for discharge. Patient stable alert and oriented. IVs removed. Catheter tip intact.  BREN PICC in place. Patient and family educated on use per case management. complaints of pain. Discussed discharge plan. Patient and family educated on medications, home care nurse resources extensively. All questions, comments concerns addressed. Reviewed medications and side effects, appointments, and answered questions with patient and family. RX sent to patient home pharmacy. Patient transported to private vehicle via hospital transport.

## 2024-06-14 NOTE — ASSESSMENT & PLAN NOTE
Will switch to Rocephin  Follow IR drainage.  Will plan to treat for up to 3 weeks  Outpatient Antibiotic Therapy Plan:     Please send referral to [facility].     1) Infection:  Liver abscess     2) Discharge Antibiotics:     Intravenous antibiotics:IV Rocephin 2 gram daily      3) Therapy Duration:     3 weeks   Estimated end date of IV antibiotics:    07/05/24  4) Outpatient Weekly Labs:     Order the following labs to be drawn on Mondays:   CBC  CMP   CPK (when on Daptomycin)  ESR  CRP     Please send all labs to [facility] .    5) Outpatient Infectious Diseases Follow-up     06/13-will continue the plan above  Continue Rocephin, monitor clinical response

## 2024-06-14 NOTE — SUBJECTIVE & OBJECTIVE
"Interval History:   43 year old man with Liver abscess/Klebsiella bacteremia .  He is tolerating meds      Review of Systems   Constitutional:  Negative for activity change, appetite change, chills, diaphoresis and fatigue.   HENT:  Negative for congestion.    Cardiovascular:  Negative for chest pain.   Musculoskeletal:  Negative for arthralgias.   Neurological:  Negative for dizziness, facial asymmetry and headaches.   Psychiatric/Behavioral:  Negative for agitation.      Objective:     Vital Signs (Most Recent):  Temp: 99.3 °F (37.4 °C) (06/14/24 0458)  Pulse: 80 (06/14/24 0458)  Resp: 17 (06/14/24 0458)  BP: (!) 163/93 (06/14/24 0458)  SpO2: 95 % (06/14/24 0458) Vital Signs (24h Range):  Temp:  [98.6 °F (37 °C)-99.5 °F (37.5 °C)] 99.3 °F (37.4 °C)  Pulse:  [] 80  Resp:  [16-20] 17  SpO2:  [94 %-97 %] 95 %  BP: (141-174)/(83-93) 163/93     Weight: 84.8 kg (186 lb 15.2 oz)  Body mass index is 33.12 kg/m².    Estimated Creatinine Clearance: 128.4 mL/min (based on SCr of 0.7 mg/dL).     Physical Exam  Vitals and nursing note reviewed.   HENT:      Head: Normocephalic.   Cardiovascular:      Rate and Rhythm: Normal rate.   Pulmonary:      Effort: Pulmonary effort is normal.   Abdominal:      General: Abdomen is flat.      Comments: Has KARINA drain over liver   Musculoskeletal:      Cervical back: Normal range of motion.   Neurological:      General: No focal deficit present.      Mental Status: He is alert and oriented to person, place, and time.   Psychiatric:         Mood and Affect: Mood normal.          Significant Labs: Blood Culture: No results for input(s): "LABBLOO" in the last 4320 hours.  BMP:   Recent Labs   Lab 06/13/24 0425      *   K 3.8      CO2 23   BUN 10   CREATININE 0.7   CALCIUM 8.7   MG 2.3     CBC:   Recent Labs   Lab 06/13/24 0425   WBC 16.82*   HGB 11.9*   HCT 35.3*   *     CMP:   Recent Labs   Lab 06/13/24  0425   *   K 3.8      CO2 23      BUN " "10   CREATININE 0.7   CALCIUM 8.7   PROT 6.7   ALBUMIN 2.2*   BILITOT 1.4*   ALKPHOS 211*   AST 48*   ALT 70*   ANIONGAP 7*     Wound Culture: No results for input(s): "LABAERO" in the last 4320 hours.  All pertinent labs within the past 24 hours have been reviewed.    Significant Imaging: I have reviewed all pertinent imaging results/findings within the past 24 hours.  "

## 2024-06-15 LAB
BACTERIA BLD CULT: NORMAL
BACTERIA BLD CULT: NORMAL
BACTERIA SPEC AEROBE CULT: ABNORMAL

## 2024-06-17 ENCOUNTER — TELEPHONE (OUTPATIENT)
Dept: INFECTIOUS DISEASES | Facility: CLINIC | Age: 44
End: 2024-06-17
Payer: COMMERCIAL

## 2024-06-17 ENCOUNTER — DOCUMENTATION ONLY (OUTPATIENT)
Dept: INFECTIOUS DISEASES | Facility: HOSPITAL | Age: 44
End: 2024-06-17
Payer: COMMERCIAL

## 2024-06-17 ENCOUNTER — HOSPITAL ENCOUNTER (INPATIENT)
Facility: HOSPITAL | Age: 44
LOS: 2 days | Discharge: HOME HEALTH | DRG: 871 | End: 2024-06-19
Attending: EMERGENCY MEDICINE | Admitting: INTERNAL MEDICINE
Payer: COMMERCIAL

## 2024-06-17 DIAGNOSIS — R50.9 FEVER, UNSPECIFIED FEVER CAUSE: ICD-10-CM

## 2024-06-17 DIAGNOSIS — K75.0 HEPATIC ABSCESS: Primary | ICD-10-CM

## 2024-06-17 DIAGNOSIS — K75.0 ABSCESS OF LIVER: Primary | ICD-10-CM

## 2024-06-17 DIAGNOSIS — R07.9 CHEST PAIN: ICD-10-CM

## 2024-06-17 DIAGNOSIS — B96.1 BACTEREMIA DUE TO KLEBSIELLA PNEUMONIAE: Primary | ICD-10-CM

## 2024-06-17 DIAGNOSIS — R78.81 BACTEREMIA DUE TO KLEBSIELLA PNEUMONIAE: Primary | ICD-10-CM

## 2024-06-17 DIAGNOSIS — A41.9 SEPTICEMIA: ICD-10-CM

## 2024-06-17 LAB
ALBUMIN SERPL-MCNC: 3.1 G/DL (ref 3.5–5)
ALBUMIN/GLOB SERPL: 0.6 RATIO (ref 1.1–2)
ALP SERPL-CCNC: 266 UNIT/L (ref 40–150)
ALT SERPL-CCNC: 108 UNIT/L (ref 0–55)
ANION GAP SERPL CALC-SCNC: 10 MEQ/L
AST SERPL-CCNC: 95 UNIT/L (ref 5–34)
B PERT.PT PRMT NPH QL NAA+NON-PROBE: NOT DETECTED
BACTERIA #/AREA URNS AUTO: ABNORMAL /HPF
BACTERIA SPEC ANAEROBE CULT: NORMAL
BASOPHILS # BLD AUTO: 0.06 X10(3)/MCL
BASOPHILS NFR BLD AUTO: 0.5 %
BILIRUB SERPL-MCNC: 0.7 MG/DL
BILIRUB UR QL STRIP.AUTO: NEGATIVE
BUN SERPL-MCNC: 12.9 MG/DL (ref 8.9–20.6)
C PNEUM DNA NPH QL NAA+NON-PROBE: NOT DETECTED
CALCIUM SERPL-MCNC: 9.6 MG/DL (ref 8.4–10.2)
CHLORIDE SERPL-SCNC: 102 MMOL/L (ref 98–107)
CLARITY UR: CLEAR
CO2 SERPL-SCNC: 24 MMOL/L (ref 22–29)
COLOR UR AUTO: COLORLESS
CREAT SERPL-MCNC: 0.84 MG/DL (ref 0.73–1.18)
CREAT/UREA NIT SERPL: 15
CRP SERPL-MCNC: 76.4 MG/L
EOSINOPHIL # BLD AUTO: 0.11 X10(3)/MCL (ref 0–0.9)
EOSINOPHIL NFR BLD AUTO: 0.9 %
ERYTHROCYTE [DISTWIDTH] IN BLOOD BY AUTOMATED COUNT: 13.9 % (ref 11.5–17)
FLUAV AG UPPER RESP QL IA.RAPID: NOT DETECTED
FLUBV AG UPPER RESP QL IA.RAPID: NOT DETECTED
GFR SERPLBLD CREATININE-BSD FMLA CKD-EPI: >60 ML/MIN/1.73/M2
GLOBULIN SER-MCNC: 5.5 GM/DL (ref 2.4–3.5)
GLUCOSE SERPL-MCNC: 117 MG/DL (ref 74–100)
GLUCOSE UR QL STRIP: NORMAL
HADV DNA NPH QL NAA+NON-PROBE: NOT DETECTED
HCOV 229E RNA NPH QL NAA+NON-PROBE: NOT DETECTED
HCOV HKU1 RNA NPH QL NAA+NON-PROBE: NOT DETECTED
HCOV NL63 RNA NPH QL NAA+NON-PROBE: NOT DETECTED
HCOV OC43 RNA NPH QL NAA+NON-PROBE: NOT DETECTED
HCT VFR BLD AUTO: 41.5 % (ref 42–52)
HGB BLD-MCNC: 13.7 G/DL (ref 14–18)
HGB UR QL STRIP: NEGATIVE
HMPV RNA NPH QL NAA+NON-PROBE: NOT DETECTED
HPIV1 RNA NPH QL NAA+NON-PROBE: NOT DETECTED
HPIV2 RNA NPH QL NAA+NON-PROBE: NOT DETECTED
HPIV3 RNA NPH QL NAA+NON-PROBE: NOT DETECTED
HPIV4 RNA NPH QL NAA+NON-PROBE: NOT DETECTED
IMM GRANULOCYTES # BLD AUTO: 0.07 X10(3)/MCL (ref 0–0.04)
IMM GRANULOCYTES NFR BLD AUTO: 0.6 %
KETONES UR QL STRIP: NEGATIVE
LACTATE SERPL-SCNC: 1.4 MMOL/L (ref 0.5–2.2)
LEUKOCYTE ESTERASE UR QL STRIP: NEGATIVE
LYMPHOCYTES # BLD AUTO: 1.94 X10(3)/MCL (ref 0.6–4.6)
LYMPHOCYTES NFR BLD AUTO: 15.7 %
M PNEUMO DNA NPH QL NAA+NON-PROBE: NOT DETECTED
MCH RBC QN AUTO: 29.9 PG (ref 27–31)
MCHC RBC AUTO-ENTMCNC: 33 G/DL (ref 33–36)
MCV RBC AUTO: 90.6 FL (ref 80–94)
MONOCYTES # BLD AUTO: 0.74 X10(3)/MCL (ref 0.1–1.3)
MONOCYTES NFR BLD AUTO: 6 %
NEUTROPHILS # BLD AUTO: 9.43 X10(3)/MCL (ref 2.1–9.2)
NEUTROPHILS NFR BLD AUTO: 76.3 %
NITRITE UR QL STRIP: NEGATIVE
NRBC BLD AUTO-RTO: 0 %
PH UR STRIP: 5.5 [PH]
PLATELET # BLD AUTO: 653 X10(3)/MCL (ref 130–400)
PMV BLD AUTO: 9.2 FL (ref 7.4–10.4)
POTASSIUM SERPL-SCNC: 4.1 MMOL/L (ref 3.5–5.1)
PROT SERPL-MCNC: 8.6 GM/DL (ref 6.4–8.3)
PROT UR QL STRIP: NEGATIVE
RBC # BLD AUTO: 4.58 X10(6)/MCL (ref 4.7–6.1)
RBC #/AREA URNS AUTO: ABNORMAL /HPF
RSV RNA NPH QL NAA+NON-PROBE: NOT DETECTED
RV+EV RNA NPH QL NAA+NON-PROBE: NOT DETECTED
SARS-COV-2 RNA RESP QL NAA+PROBE: NOT DETECTED
SODIUM SERPL-SCNC: 136 MMOL/L (ref 136–145)
SP GR UR STRIP.AUTO: 1.03 (ref 1–1.03)
SQUAMOUS #/AREA URNS LPF: ABNORMAL /HPF
UROBILINOGEN UR STRIP-ACNC: NORMAL
WBC # BLD AUTO: 12.35 X10(3)/MCL (ref 4.5–11.5)
WBC #/AREA URNS AUTO: ABNORMAL /HPF

## 2024-06-17 PROCEDURE — 83605 ASSAY OF LACTIC ACID: CPT | Performed by: NURSE PRACTITIONER

## 2024-06-17 PROCEDURE — 87581 M.PNEUMON DNA AMP PROBE: CPT | Performed by: EMERGENCY MEDICINE

## 2024-06-17 PROCEDURE — 87040 BLOOD CULTURE FOR BACTERIA: CPT | Performed by: NURSE PRACTITIONER

## 2024-06-17 PROCEDURE — 25000003 PHARM REV CODE 250: Performed by: INTERNAL MEDICINE

## 2024-06-17 PROCEDURE — 11000001 HC ACUTE MED/SURG PRIVATE ROOM

## 2024-06-17 PROCEDURE — 86140 C-REACTIVE PROTEIN: CPT | Performed by: INTERNAL MEDICINE

## 2024-06-17 PROCEDURE — 87186 SC STD MICRODIL/AGAR DIL: CPT | Performed by: INTERNAL MEDICINE

## 2024-06-17 PROCEDURE — 63600175 PHARM REV CODE 636 W HCPCS: Performed by: INTERNAL MEDICINE

## 2024-06-17 PROCEDURE — 80053 COMPREHEN METABOLIC PANEL: CPT | Performed by: NURSE PRACTITIONER

## 2024-06-17 PROCEDURE — 87077 CULTURE AEROBIC IDENTIFY: CPT | Performed by: INTERNAL MEDICINE

## 2024-06-17 PROCEDURE — 81001 URINALYSIS AUTO W/SCOPE: CPT | Performed by: EMERGENCY MEDICINE

## 2024-06-17 PROCEDURE — 0240U COVID/FLU A&B PCR: CPT | Performed by: EMERGENCY MEDICINE

## 2024-06-17 PROCEDURE — 85025 COMPLETE CBC W/AUTO DIFF WBC: CPT | Performed by: NURSE PRACTITIONER

## 2024-06-17 PROCEDURE — 25500020 PHARM REV CODE 255: Performed by: EMERGENCY MEDICINE

## 2024-06-17 PROCEDURE — 99285 EMERGENCY DEPT VISIT HI MDM: CPT | Mod: 25

## 2024-06-17 PROCEDURE — 87798 DETECT AGENT NOS DNA AMP: CPT | Performed by: EMERGENCY MEDICINE

## 2024-06-17 RX ORDER — POLYETHYLENE GLYCOL 3350 17 G/17G
17 POWDER, FOR SOLUTION ORAL 2 TIMES DAILY PRN
Status: DISCONTINUED | OUTPATIENT
Start: 2024-06-17 | End: 2024-06-19 | Stop reason: HOSPADM

## 2024-06-17 RX ORDER — FAMOTIDINE 20 MG/1
20 TABLET, FILM COATED ORAL DAILY
Status: DISCONTINUED | OUTPATIENT
Start: 2024-06-18 | End: 2024-06-19 | Stop reason: HOSPADM

## 2024-06-17 RX ORDER — ACETAMINOPHEN 325 MG/1
650 TABLET ORAL EVERY 4 HOURS PRN
Status: DISCONTINUED | OUTPATIENT
Start: 2024-06-17 | End: 2024-06-19 | Stop reason: HOSPADM

## 2024-06-17 RX ORDER — AMOXICILLIN 250 MG
2 CAPSULE ORAL 2 TIMES DAILY PRN
Status: DISCONTINUED | OUTPATIENT
Start: 2024-06-17 | End: 2024-06-19 | Stop reason: HOSPADM

## 2024-06-17 RX ORDER — SODIUM CHLORIDE 0.9 % (FLUSH) 0.9 %
10 SYRINGE (ML) INJECTION
Status: DISCONTINUED | OUTPATIENT
Start: 2024-06-17 | End: 2024-06-19 | Stop reason: HOSPADM

## 2024-06-17 RX ORDER — ENOXAPARIN SODIUM 100 MG/ML
40 INJECTION SUBCUTANEOUS EVERY 24 HOURS
Status: DISCONTINUED | OUTPATIENT
Start: 2024-06-18 | End: 2024-06-19 | Stop reason: HOSPADM

## 2024-06-17 RX ORDER — ALUMINUM HYDROXIDE, MAGNESIUM HYDROXIDE, AND SIMETHICONE 1200; 120; 1200 MG/30ML; MG/30ML; MG/30ML
30 SUSPENSION ORAL 4 TIMES DAILY PRN
Status: DISCONTINUED | OUTPATIENT
Start: 2024-06-17 | End: 2024-06-19 | Stop reason: HOSPADM

## 2024-06-17 RX ORDER — TALC
6 POWDER (GRAM) TOPICAL NIGHTLY PRN
Status: DISCONTINUED | OUTPATIENT
Start: 2024-06-17 | End: 2024-06-19 | Stop reason: HOSPADM

## 2024-06-17 RX ORDER — PROCHLORPERAZINE EDISYLATE 5 MG/ML
5 INJECTION INTRAMUSCULAR; INTRAVENOUS EVERY 6 HOURS PRN
Status: DISCONTINUED | OUTPATIENT
Start: 2024-06-17 | End: 2024-06-19 | Stop reason: HOSPADM

## 2024-06-17 RX ORDER — ONDANSETRON HYDROCHLORIDE 2 MG/ML
4 INJECTION, SOLUTION INTRAVENOUS EVERY 4 HOURS PRN
Status: DISCONTINUED | OUTPATIENT
Start: 2024-06-17 | End: 2024-06-19 | Stop reason: HOSPADM

## 2024-06-17 RX ADMIN — CEFTRIAXONE SODIUM 2 G: 2 INJECTION, POWDER, FOR SOLUTION INTRAMUSCULAR; INTRAVENOUS at 11:06

## 2024-06-17 RX ADMIN — IOHEXOL 100 ML: 350 INJECTION, SOLUTION INTRAVENOUS at 05:06

## 2024-06-17 NOTE — ED PROVIDER NOTES
Encounter Date: 6/17/2024       History     Chief Complaint   Patient presents with    Fever     Fever of 102 at home, recently admitted for a liver abscess, Sent by Ka[facility] DUNN for blood cultures and further work up. On rocephin for infection through PICC line.      43-year-old male was admitted the beginning of this month for fever and liver abscesses with Klebsiella pneumoniae bacteremia.  Initially started on broad-spectrum antibiotics ultimately transferred to [place] for interventional radiology evaluation and patient had placement of hepatic drain.  Patient currently on Rocephin.  [facility] health nurse was seeing him about fever at home it improved with acetaminophen states has been running fever for the past few days.  Infectious Disease that he saw in [place] recommended he come back here for admission and further workup.  Denies any other complaints no chest pain no shortness breath no abdominal pain except at the site of the drain when moving.  No dysuria or other issues.        Review of patient's allergies indicates:  No Known Allergies  No past medical history on file.  No past surgical history on file.  No family history on file.     Review of Systems   Constitutional:  Positive for fever. Negative for chills.   Respiratory:  Negative for cough and shortness of breath.    Cardiovascular:  Negative for chest pain.   Gastrointestinal:  Positive for abdominal pain. Negative for nausea and vomiting.   Musculoskeletal:  Negative for myalgias.   All other systems reviewed and are negative.      Physical Exam     Initial Vitals [06/17/24 1551]   BP Pulse Resp Temp SpO2   122/84 99 17 97.9 °F (36.6 °C) 96 %      MAP       --         Physical Exam    Nursing note and vitals reviewed.  Constitutional: He appears well-developed and well-nourished. No distress.   HENT:   Head: Normocephalic and atraumatic.   Eyes: Conjunctivae are normal.   Cardiovascular:  Normal rate.           Pulmonary/Chest: No  respiratory distress. He has no wheezes. He has no rhonchi.   Abdominal: Abdomen is soft. There is no abdominal tenderness.   Drain in place in the right upper quadrant.  There is tan yellowish purulent material in the bulb no surrounding erythema or induration There is no rebound and no guarding.   Musculoskeletal:         General: Normal range of motion.     Neurological: He is alert and oriented to person, place, and time. He has normal strength.   Skin: Skin is warm and dry.   Psychiatric: He has a normal mood and affect.         ED Course   Procedures  Labs Reviewed   CBC WITH DIFFERENTIAL - Abnormal; Notable for the following components:       Result Value    WBC 12.35 (*)     RBC 4.58 (*)     Hgb 13.7 (*)     Hct 41.5 (*)     Platelet 653 (*)     Neut # 9.43 (*)     IG# 0.07 (*)     All other components within normal limits   COMPREHENSIVE METABOLIC PANEL - Abnormal; Notable for the following components:    Glucose 117 (*)     Protein Total 8.6 (*)     Albumin 3.1 (*)     Globulin 5.5 (*)     Albumin/Globulin Ratio 0.6 (*)      (*)      (*)     AST 95 (*)     All other components within normal limits   LACTIC ACID, PLASMA - Normal   BLOOD CULTURE [facility]   BLOOD CULTURE [facility]   RESPIRATORY PANEL   URINALYSIS, REFLEX TO URINE CULTURE   COVID/FLU A&B PCR          Imaging Results              CT Chest Abdomen Pelvis With IV Contrast (XPD) NO Oral Contrast (In process)  Result time 06/17/24 17:21:33                     Medications   iohexoL (OMNIPAQUE 350) injection 100 mL (100 mLs Intravenous Given 6/17/24 1714)     Medical Decision Making  Patient has known liver abscess had a drain placed still draining.  Cultures grew Klebsiella pneumoniae sensitive to ceftriaxone.  Paperwork in the room states 2 g Rocephin every 12 hours.  He has been doing that at home however they were foreign by the home nurse that it should only has been once a day.  Patient has been running fever for the past few days resolved  with the acetaminophen use.  He was instructed by Infectious Disease return here for admission CT chest abdomen pelvis additional blood cultures.    Amount and/or Complexity of Data Reviewed  Radiology: ordered.    Risk  Prescription drug management.  Decision regarding hospitalization.               ED Course as of 06/17/24 1728 Mon Jun 17, 2024 1658 Dr Petersen called [LF]   1713 Discussed whether to broaden antibiotic coverage or continue current treatment.  Dr. Petersen will review the chart and recall [LF]   1718 Discussed with hospitalist (Melisa)they will admit [LF]   1725 Dr Petersen will follow, agrees admit to IM.  He will follow up CT scans for now we will continue Rocephin may consider broadening back to previous coverage of vancomycin cefepime and Flagyl if he continues to spike fevers [LF]      ED Course User Index  [LF] Mauricio Cummings MD                           Clinical Impression:  Final diagnoses:  [K75.0] Abscess of liver (Primary)  [A41.9] Septicemia  [R50.9] Fever, unspecified fever cause          ED Disposition Condition    Admit Stable                Mauricio Cummings MD  06/17/24 1728

## 2024-06-17 NOTE — TELEPHONE ENCOUNTER
Patient stated he has been running fever. Patient advised per Dr. Dugan to go to ED for further evaluation. Orders placed by Dr. Dugan for STAT CBC, CT OF ABDOMEN AND CHEST, AND BLOOD CULTURES. [facility] Dr. Dugan informed

## 2024-06-17 NOTE — CARE UPDATE
I was informed that he is having fevers up to 102 at home .    Plan - will need to repeat imaging -  Will do CT chest /Abd /Plevis -ASAP  Will need to go to ED if there is worsening liver lesions .    Repeat blood cultures

## 2024-06-17 NOTE — Clinical Note
Admit to which facility:: [facility] [[id number]]   Diagnosis: Abscess of liver [[id number]]   Future Attending Provider: DYAN MCADAMS [[id number]]   Reason for IP Medical Treatment  (Clinical interventions that can only be accomplished in the IP setting? ) :: see diagnosis   Estimated Length of Stay:: 2 midnights   I certify that Inpatient services for greater than or equal to 2 midnights are medically necessary:: Yes   Plans for Post-Acute care--if anticipated (pick the single best option):: A. No post acute care anticipated at this time

## 2024-06-17 NOTE — PROGRESS NOTES
I called and informed DR Lake Meehan at the [facility] ED about the new fever and the plan to do pan CT scan of chest /abd/plevis.  Patient is on hs way to the ED at this time

## 2024-06-18 LAB
ALBUMIN SERPL-MCNC: 2.8 G/DL (ref 3.5–5)
ALBUMIN/GLOB SERPL: 0.6 RATIO (ref 1.1–2)
ALP SERPL-CCNC: 351 UNIT/L (ref 40–150)
ALT SERPL-CCNC: 181 UNIT/L (ref 0–55)
ANION GAP SERPL CALC-SCNC: 9 MEQ/L
AST SERPL-CCNC: 214 UNIT/L (ref 5–34)
BASOPHILS # BLD AUTO: 0.05 X10(3)/MCL
BASOPHILS NFR BLD AUTO: 0.3 %
BILIRUB SERPL-MCNC: 0.8 MG/DL
BUN SERPL-MCNC: 14.9 MG/DL (ref 8.9–20.6)
CALCIUM SERPL-MCNC: 9.3 MG/DL (ref 8.4–10.2)
CHLORIDE SERPL-SCNC: 102 MMOL/L (ref 98–107)
CO2 SERPL-SCNC: 21 MMOL/L (ref 22–29)
CREAT SERPL-MCNC: 0.95 MG/DL (ref 0.73–1.18)
CREAT/UREA NIT SERPL: 16
EOSINOPHIL # BLD AUTO: 0.05 X10(3)/MCL (ref 0–0.9)
EOSINOPHIL NFR BLD AUTO: 0.3 %
ERYTHROCYTE [DISTWIDTH] IN BLOOD BY AUTOMATED COUNT: 13.8 % (ref 11.5–17)
FINAL PATHOLOGIC DIAGNOSIS: NORMAL
GFR SERPLBLD CREATININE-BSD FMLA CKD-EPI: >60 ML/MIN/1.73/M2
GLOBULIN SER-MCNC: 5 GM/DL (ref 2.4–3.5)
GLUCOSE SERPL-MCNC: 129 MG/DL (ref 74–100)
HCT VFR BLD AUTO: 38.1 % (ref 42–52)
HGB BLD-MCNC: 12.4 G/DL (ref 14–18)
IMM GRANULOCYTES # BLD AUTO: 0.06 X10(3)/MCL (ref 0–0.04)
IMM GRANULOCYTES NFR BLD AUTO: 0.4 %
LYMPHOCYTES # BLD AUTO: 0.91 X10(3)/MCL (ref 0.6–4.6)
LYMPHOCYTES NFR BLD AUTO: 6.2 %
Lab: NORMAL
MAGNESIUM SERPL-MCNC: 2.3 MG/DL (ref 1.6–2.6)
MCH RBC QN AUTO: 29.7 PG (ref 27–31)
MCHC RBC AUTO-ENTMCNC: 32.5 G/DL (ref 33–36)
MCV RBC AUTO: 91.4 FL (ref 80–94)
MONOCYTES # BLD AUTO: 0.71 X10(3)/MCL (ref 0.1–1.3)
MONOCYTES NFR BLD AUTO: 4.8 %
NEUTROPHILS # BLD AUTO: 12.88 X10(3)/MCL (ref 2.1–9.2)
NEUTROPHILS NFR BLD AUTO: 88 %
NRBC BLD AUTO-RTO: 0 %
PHOSPHATE SERPL-MCNC: 5.2 MG/DL (ref 2.3–4.7)
PLATELET # BLD AUTO: 565 X10(3)/MCL (ref 130–400)
PMV BLD AUTO: 9.4 FL (ref 7.4–10.4)
POTASSIUM SERPL-SCNC: 4.4 MMOL/L (ref 3.5–5.1)
PROT SERPL-MCNC: 7.8 GM/DL (ref 6.4–8.3)
RBC # BLD AUTO: 4.17 X10(6)/MCL (ref 4.7–6.1)
SODIUM SERPL-SCNC: 132 MMOL/L (ref 136–145)
WBC # BLD AUTO: 14.66 X10(3)/MCL (ref 4.5–11.5)

## 2024-06-18 PROCEDURE — 25000003 PHARM REV CODE 250: Performed by: INTERNAL MEDICINE

## 2024-06-18 PROCEDURE — 11000001 HC ACUTE MED/SURG PRIVATE ROOM

## 2024-06-18 PROCEDURE — 84100 ASSAY OF PHOSPHORUS: CPT | Performed by: INTERNAL MEDICINE

## 2024-06-18 PROCEDURE — 63600175 PHARM REV CODE 636 W HCPCS: Performed by: INTERNAL MEDICINE

## 2024-06-18 PROCEDURE — 83735 ASSAY OF MAGNESIUM: CPT | Performed by: INTERNAL MEDICINE

## 2024-06-18 PROCEDURE — 80053 COMPREHEN METABOLIC PANEL: CPT | Performed by: INTERNAL MEDICINE

## 2024-06-18 PROCEDURE — 94799 UNLISTED PULMONARY SVC/PX: CPT

## 2024-06-18 PROCEDURE — 99900031 HC PATIENT EDUCATION (STAT)

## 2024-06-18 PROCEDURE — 27000221 HC OXYGEN, UP TO 24 HOURS

## 2024-06-18 PROCEDURE — 85025 COMPLETE CBC W/AUTO DIFF WBC: CPT | Performed by: INTERNAL MEDICINE

## 2024-06-18 RX ORDER — IBUPROFEN 600 MG/1
600 TABLET ORAL
Status: COMPLETED | OUTPATIENT
Start: 2024-06-18 | End: 2024-06-18

## 2024-06-18 RX ADMIN — CEFTRIAXONE SODIUM 2 G: 2 INJECTION, POWDER, FOR SOLUTION INTRAMUSCULAR; INTRAVENOUS at 09:06

## 2024-06-18 RX ADMIN — ACETAMINOPHEN 650 MG: 325 TABLET, FILM COATED ORAL at 01:06

## 2024-06-18 RX ADMIN — IBUPROFEN 600 MG: 600 TABLET, FILM COATED ORAL at 02:06

## 2024-06-18 RX ADMIN — ENOXAPARIN SODIUM 40 MG: 40 INJECTION SUBCUTANEOUS at 05:06

## 2024-06-18 RX ADMIN — FAMOTIDINE 20 MG: 20 TABLET, FILM COATED ORAL at 09:06

## 2024-06-18 NOTE — NURSING
Nurses Note -- 4 Eyes      6/18/2024   10:15 AM      Skin assessed during: Admit      [x] No Altered Skin Integrity Present    []Prevention Measures Documented      [] Yes- Altered Skin Integrity Present or Discovered   [] LDA Added if Not in Epic (Describe Wound)   [] New Altered Skin Integrity was Present on Admit and Documented in LDA   [] Wound Image Taken    Wound Care Consulted? No    Attending Nurse:  Eloina Rojas RN    Second RN/Staff Member: David Moreno LPN

## 2024-06-18 NOTE — PROGRESS NOTES
[facility] - [facility] MEDICINE ~ PROGRESS NOTE        CHIEF COMPLAINT   Hospital follow up    HOSPITAL COURSE       Today  He is coming in with fevers after liver abscess and being put on antibiotics at home.  He said that his drain got clogged and had to be flushed and started having fevers after that.  Unclear where he developed liver abscesses from to begin with, possibly: Pathology/malignancy?  Will ask GI for endoscopy evaluation.        OBJECTIVE/PHYSICAL EXAM     VITAL SIGNS (MOST RECENT):  Temp: 98 °F (36.7 °C) (06/18/24 0829)  Pulse: 81 (06/18/24 0829)  Resp: 19 (06/18/24 0715)  BP: 116/76 (06/18/24 0829)  SpO2: 96 % (06/18/24 0829) VITAL SIGNS (24 HOUR RANGE):  Temp:  [97.9 °F (36.6 °C)-100.6 °F (38.1 °C)] 98 °F (36.7 °C)  Pulse:  [] 81  Resp:  [17-33] 19  SpO2:  [91 %-98 %] 96 %  BP: (101-198)/(64-94) 116/76   GENERAL: In no acute distress, afebrile  HEENT:  CHEST: Clear to auscultation bilaterally  HEART: S1, S2, no appreciable murmur  ABDOMEN: Soft, nontender, BS +, right upper quadrant drain with purulence  MSK: Warm, no lower extremity edema, no clubbing or cyanosis  NEUROLOGIC: Alert and oriented x4, moving all extremities with good strength   INTEGUMENTARY:  PSYCHIATRY:        ASSESSMENT/PLAN   Liver abscess  Sepsis 2/2 above   Fevers recurrence       GI consulted for eval of endoscopy evaluation to locate source of the liver abscess  Monitor drain output, can flush if needed.  Continue Rocephin, infectious disease consulted    DVT prophylaxis:     Critical care time spent: 35 minutes on ongoing sepsis secondary to liver abscess    Anticipated discharge and disposition:   __________________________________________________________________________    NUTRITIONAL STATUS     Patient meets ASPEN criteria for   malnutrition of   per RD assessment as evidenced by:                       A minimum of two characteristics is recommended for diagnosis of either  severe or non-severe malnutrition.     LABS/MICRO/MEDS/DIAGNOSTICS       LABS  Recent Labs     06/18/24 0415   *   K 4.4   CO2 21*   BUN 14.9   CREATININE 0.95   GLUCOSE 129*   CALCIUM 9.3   ALKPHOS 351*   *   *   ALBUMIN 2.8*     Recent Labs     06/18/24 0415   WBC 14.66*   RBC 4.17*   HCT 38.1*   MCV 91.4   *       MICROBIOLOGY  Microbiology Results (last 7 days)       Procedure Component Value Units Date/Time    Body Fluid Culture [[id number]] Collected: 06/17/24 2156    Order Status: Completed Specimen: Body Fluid from Liver Updated: 06/18/24 0637     Body Fluid Culture No Growth At 24 Hours    Blood culture #2 **CANNOT BE ORDERED STAT** [[id number]] Collected: 06/17/24 1615    Order Status: Resulted Specimen: Blood Updated: 06/17/24 1627    Blood culture #1 **CANNOT BE ORDERED STAT** [[id number]] Collected: 06/17/24 1615    Order Status: Resulted Specimen: Blood Updated: 06/17/24 1627               MEDICATIONS   cefTRIAXone (Rocephin) IV (PEDS and ADULTS)  2 g Intravenous Q12H    enoxparin  40 mg Subcutaneous Q24H (prophylaxis, 1700)    famotidine  20 mg Oral Daily         INFUSIONS         DIAGNOSTIC TESTS  CT Chest Abdomen Pelvis With IV Contrast (XPD) NO Oral Contrast   Final Result      1.  Bilateral mild progression of lower lung lobe posterior subpleural atelectasis with concern for some infiltrates especially on the right.      2.  Improved hepatic complex collections.  No new abdominopelvic findings.         Electronically signed by: Taqueria Thompson   Date:    06/17/2024   Time:    17:32           Echo    Result Date: 6/14/2024    Left Ventricle: The left ventricle is normal in size. Normal wall   thickness. There is normal systolic function with a visually estimated   ejection fraction of 60 - 65%. There is normal diastolic function.    Right Ventricle: Normal right ventricular cavity size. Wall thickness   is normal. Systolic function is normal.    Aortic Valve: There is mild  aortic regurgitation.    Mitral Valve: There is mild regurgitation.    IVC/SVC: Normal venous pressure at 3 mmHg.              Case related differential diagnoses have been reviewed; assessment and plan has been documented. I have personally reviewed the labs and test results that are currently available; I have reviewed the patients medication list. I have reviewed the consulting providers recommendations. I have reviewed or attempted to review medical records based upon their availability.  All of the patient's and/or family's questions have been addressed and answered to the best of my ability.  I will continue to monitor closely and make adjustments to medical management as needed.  This document was created using [email or web address] Direct.  Transcription errors may have been made.  Please contact me if any questions may rise regarding documentation to clarify transcription.        Shmuel Castle MD   Internal Medicine  Department of Hospital Medicine  [facility]

## 2024-06-18 NOTE — CONSULTS
Consult Note    Reason for Consult:      We were consulted by Dr. Castle to evaluate this patient for eval for endoscopy to eval for source of liver abscess.   HPI:     Patient is a 43-year-old [place] male known to our group no past medical history.    6/7: Previously admitted with fever. Ct abd/pelv showed 3 hypoattenuating liver lesions concerning for  mass versus infection.  He underwent CT of liver biopsy with IR done 6/7.  He was started on cefepime but remained febrile, cultures were positive for Klebsiella pneumoniae.  He was eventually transferred to [facility] for IR for hepatic abscess drainage, as repeat imaging showed liver lesions appear to be enlarging and IR at [facility] felt there was not percutaneous access available. He was started on broad-spectrum antibiotics and transferred to [place] for IR eval where he had hepatic drain place 6/12 (8.5 Fr). He was d/c'd 6/14 from [facility] and following ID in .    6/15: His drain had a decrease in output and his home health nurse flushes the catheter. He developed fever and chills that night and continued. He presented to the ER 6/17 complaints of a fever at home, T-max 102°F.  Upon arrival to the ER, temp 97.9° F, VSS. Labs WBC 12.11, H&H 13.7/41.5.  Blood cultures pending and body fluid collection no growth at 24 hr.  -CT abdomen pelvis with IV: Right hepatic lobe anterior segment complex appearing hypodense collection shows interval size improvement and now measures 2.3 x 3.0 cm and this previously was 4.3 x 3.7 cm. Right hepatic lobe another posterior aspect septated collection also shows size improvement and now measures 2.6 x 3.5 cm and this previously measured 3.7 x 3.2 cm. Large septated complex collection centered within the chordal lobe of liver also shows improvement and now measures 5.0 x 5.0 cm and this previously was 6.7 x 7.0 cm. This contains the end of the drainage catheter. No new hepatic lesion or collection. Hepatic  volume is within normal limits.      He was admitted with sepsis 2/2 klebsiella liver abscess    GI consulted for endoscopy evaluation for liver abscess.  Today:  WBC 14.66 ID is following- currently on Rocephin   AST 75-95--214  ALT 88--108--181  CRP 76     Temp over night 100.6.  Patient resting in bed, family at bedside.  He denies any abdominal pain, nausea or vomiting.  He reports he is actually feeling pretty great compared to his initial visit 6/7.  He is able to eat and drink and having regular formed bowel movements without any melena bright red blood.  KARINA drain noted to mid right abdomen compressed with milky gray minimal drainage noted.    Previous records reviewed. Collateral information obtained from family member present at bedside.  No history of EGD or colonoscopy.  Denies any family history of colon cancer esophageal cancer or liver disease.  Alcohol use less than once a month, denies tobacco or drug use.    PCP:  Huma Saleh DO    Review of patient's allergies indicates:  No Known Allergies     Current Facility-Administered Medications   Medication Dose Route Frequency Provider Last Rate Last Admin    acetaminophen tablet 650 mg  650 mg Oral Q4H PRN Lidia Campbell MD   650 mg at 06/18/24 0118    aluminum-magnesium hydroxide-simethicone 200-200-20 mg/5 mL suspension 30 mL  30 mL Oral QID PRN Lidia Campbell MD        cefTRIAXone (ROCEPHIN) 2 g in dextrose 5 % in water (D5W) 100 mL IVPB (MB+)  2 g Intravenous Q12H Lidia Campbell MD   Stopped at 06/18/24 1013    enoxaparin injection 40 mg  40 mg Subcutaneous Q24H (prophylaxis, 1700) Lidia Campbell MD        famotidine tablet 20 mg  20 mg Oral Daily Lidia Campbell MD   20 mg at 06/18/24 0943    melatonin tablet 6 mg  6 mg Oral Nightly PRN Lidia Campbell MD        ondansetron injection 4 mg  4 mg Intravenous Q4H PRN Lidia Campbell MD        polyethylene glycol packet 17 g  17 g Oral BID PRN Lidia Campbell MD        prochlorperazine injection Soln 5 mg  5  mg Intravenous Q6H PRN Lidia Campbell MD        senna-docusate 8.6-50 mg per tablet 2 tablet  2 tablet Oral BID PRN Lidia Campbell MD        sodium chloride 0.9% flush 10 mL  10 mL Intravenous PRN Lidia Campbell MD         Medications Prior to Admission   Medication Sig Dispense Refill Last Dose    dextrose 5 % in water (D5W) PgBk 100 mL with cefTRIAXone 2 gram SolR 2 g Inject 2 g into the vein every 12 (twelve) hours.   6/17/2024    famotidine (PEPCID) 20 MG tablet Take 1 tablet (20 mg total) by mouth 2 (two) times daily. 60 tablet 0 Unknown    ondansetron (ZOFRAN-ODT) 4 MG TbDL Dissolve 1 tablet (4 mg total) by mouth 2 (two) times daily. 30 tablet 0 Unknown       Past Medical History:  No past medical history on file.   Past Surgical History:  No past surgical history on file.   Family History:  No family history on file.  Social History:  Social History     Tobacco Use    Smoking status: Not on file    Smokeless tobacco: Not on file   Substance Use Topics    Alcohol use: Not on file       Review of Systems:     Review of Systems   Constitutional:  Positive for fever. Negative for activity change, appetite change, chills, diaphoresis, fatigue and unexpected weight change.   Respiratory:  Negative for apnea, cough and shortness of breath.    Cardiovascular:  Negative for chest pain.   Gastrointestinal:  Negative for abdominal distention, abdominal pain, anal bleeding, blood in stool, constipation, diarrhea, nausea, rectal pain and vomiting.   Skin:  Negative for color change and pallor.   Psychiatric/Behavioral:  Negative for agitation, confusion and suicidal ideas.        Objective:     VITAL SIGNS: 24 HR MIN & MAX LAST    Temp  Min: 97.9 °F (36.6 °C)  Max: 100.6 °F (38.1 °C)  98.3 °F (36.8 °C)        BP  Min: 101/64  Max: 198/93  119/80     Pulse  Min: 72  Max: 120  79     Resp  Min: 17  Max: 33  18    SpO2  Min: 91 %  Max: 98 %  95 %        Intake/Output Summary (Last 24 hours) at 6/18/2024 1231  Last data filed at  6/18/2024 0120  Gross per 24 hour   Intake --   Output 225 ml   Net -225 ml       Physical Exam  Constitutional:       Appearance: He is obese. He is not ill-appearing or toxic-appearing.   HENT:      Head: Normocephalic and atraumatic.      Mouth/Throat:      Mouth: Mucous membranes are moist.      Pharynx: Oropharynx is clear.   Cardiovascular:      Rate and Rhythm: Normal rate and regular rhythm.      Pulses: Normal pulses.      Heart sounds: Normal heart sounds.   Pulmonary:      Effort: Pulmonary effort is normal. No respiratory distress.      Breath sounds: Normal breath sounds. No stridor. No wheezing or rales.   Abdominal:      General: Bowel sounds are normal. There is no distension.      Palpations: Abdomen is soft. There is no mass.      Tenderness: There is no abdominal tenderness. There is no guarding.      Comments: Obese abdomen. Drain noted to right mid abdomen with milky gray drainage.   Musculoskeletal:         General: Normal range of motion.   Skin:     General: Skin is warm and dry.      Coloration: Skin is not jaundiced or pale.   Neurological:      Mental Status: He is alert and oriented to person, place, and time.   Psychiatric:         Mood and Affect: Mood normal.         Behavior: Behavior normal.         Thought Content: Thought content normal.         Judgment: Judgment normal.           Recent Results (from the past 48 hour(s))   Sedimentation Rate    Collection Time: 06/17/24 12:45 PM   Result Value Ref Range    Sed Rate 109 (H) 0 - 15 mm/hr   Comprehensive Metabolic Panel    Collection Time: 06/17/24 12:45 PM   Result Value Ref Range    Sodium 137 136 - 145 mmol/L    Potassium 4.7 3.5 - 5.1 mmol/L    Chloride 103 98 - 107 mmol/L    CO2 24 22 - 29 mmol/L    Glucose 117 (H) 74 - 100 mg/dL    Blood Urea Nitrogen 12.8 8.9 - 20.6 mg/dL    Creatinine 0.81 0.73 - 1.18 mg/dL    Calcium 8.7 8.4 - 10.2 mg/dL    Protein Total 7.2 6.4 - 8.3 gm/dL    Albumin 2.8 (L) 3.5 - 5.0 g/dL    Globulin 4.4  (H) 2.4 - 3.5 gm/dL    Albumin/Globulin Ratio 0.6 (L) 1.1 - 2.0 ratio    Bilirubin Total 0.6 <=1.5 mg/dL     (H) 40 - 150 unit/L    ALT 88 (H) 0 - 55 unit/L    AST 75 (H) 5 - 34 unit/L    eGFR >60 mL/min/1.73/m2    Anion Gap 10.0 mEq/L    BUN/Creatinine Ratio 16    CK    Collection Time: 06/17/24 12:45 PM   Result Value Ref Range    Creatine Kinase 17 (L) 30 - 200 U/L   CRP, High Sensitivity    Collection Time: 06/17/24 12:45 PM   Result Value Ref Range    C-Reactive Protein High Sensitivity 70.89 (H) <=5.00 mg/L   CBC with Differential    Collection Time: 06/17/24 12:45 PM   Result Value Ref Range    WBC 12.11 (H) 4.50 - 11.50 x10(3)/mcL    RBC 4.19 (L) 4.70 - 6.10 x10(6)/mcL    Hgb 12.4 (L) 14.0 - 18.0 g/dL    Hct 38.6 (L) 42.0 - 52.0 %    MCV 92.1 80.0 - 94.0 fL    MCH 29.6 27.0 - 31.0 pg    MCHC 32.1 (L) 33.0 - 36.0 g/dL    RDW 14.1 11.5 - 17.0 %    Platelet 631 (H) 130 - 400 x10(3)/mcL    MPV 9.9 7.4 - 10.4 fL    Neut % 79.1 %    Lymph % 13.4 %    Mono % 5.7 %    Eos % 0.7 %    Basophil % 0.4 %    Lymph # 1.62 0.6 - 4.6 x10(3)/mcL    Neut # 9.58 (H) 2.1 - 9.2 x10(3)/mcL    Mono # 0.69 0.1 - 1.3 x10(3)/mcL    Eos # 0.09 0 - 0.9 x10(3)/mcL    Baso # 0.05 <=0.2 x10(3)/mcL    IG# 0.08 (H) 0 - 0.04 x10(3)/mcL    IG% 0.7 %    NRBC% 0.0 %   CBC with Differential    Collection Time: 06/17/24  4:15 PM   Result Value Ref Range    WBC 12.35 (H) 4.50 - 11.50 x10(3)/mcL    RBC 4.58 (L) 4.70 - 6.10 x10(6)/mcL    Hgb 13.7 (L) 14.0 - 18.0 g/dL    Hct 41.5 (L) 42.0 - 52.0 %    MCV 90.6 80.0 - 94.0 fL    MCH 29.9 27.0 - 31.0 pg    MCHC 33.0 33.0 - 36.0 g/dL    RDW 13.9 11.5 - 17.0 %    Platelet 653 (H) 130 - 400 x10(3)/mcL    MPV 9.2 7.4 - 10.4 fL    Neut % 76.3 %    Lymph % 15.7 %    Mono % 6.0 %    Eos % 0.9 %    Basophil % 0.5 %    Lymph # 1.94 0.6 - 4.6 x10(3)/mcL    Neut # 9.43 (H) 2.1 - 9.2 x10(3)/mcL    Mono # 0.74 0.1 - 1.3 x10(3)/mcL    Eos # 0.11 0 - 0.9 x10(3)/mcL    Baso # 0.06 <=0.2 x10(3)/mcL    IG# 0.07 (H)  0 - 0.04 x10(3)/mcL    IG% 0.6 %    NRBC% 0.0 %   Comprehensive Metabolic Panel    Collection Time: 06/17/24  4:15 PM   Result Value Ref Range    Sodium 136 136 - 145 mmol/L    Potassium 4.1 3.5 - 5.1 mmol/L    Chloride 102 98 - 107 mmol/L    CO2 24 22 - 29 mmol/L    Glucose 117 (H) 74 - 100 mg/dL    Blood Urea Nitrogen 12.9 8.9 - 20.6 mg/dL    Creatinine 0.84 0.73 - 1.18 mg/dL    Calcium 9.6 8.4 - 10.2 mg/dL    Protein Total 8.6 (H) 6.4 - 8.3 gm/dL    Albumin 3.1 (L) 3.5 - 5.0 g/dL    Globulin 5.5 (H) 2.4 - 3.5 gm/dL    Albumin/Globulin Ratio 0.6 (L) 1.1 - 2.0 ratio    Bilirubin Total 0.7 <=1.5 mg/dL     (H) 40 - 150 unit/L     (H) 0 - 55 unit/L    AST 95 (H) 5 - 34 unit/L    eGFR >60 mL/min/1.73/m2    Anion Gap 10.0 mEq/L    BUN/Creatinine Ratio 15    Lactic Acid, Plasma    Collection Time: 06/17/24  4:15 PM   Result Value Ref Range    Lactic Acid Level 1.4 0.5 - 2.2 mmol/L   C-Reactive Protein    Collection Time: 06/17/24  4:15 PM   Result Value Ref Range    CRP 76.40 (H) <5.00 mg/L   Respiratory Panel    Collection Time: 06/17/24  5:22 PM   Result Value Ref Range    Adenovirus Not Detected Not Detected    Coronavirus 229E Not Detected Not Detected    Coronavirus HKU1 Not Detected Not Detected    Coronavirus NL63 Not Detected Not Detected    Coronavirus OC43 PCR, Common Cold Virus Not Detected Not Detected    Human Metapneumovirus Not Detected Not Detected    Parainfluenza Virus 1 Not Detected Not Detected    Parainfluenza Virus 2 Not Detected Not Detected    Parainfluenza Virus 3 Not Detected Not Detected    Parainfluenza Virus 4 Not Detected Not Detected    Bordetella pertussis (ptxP) Not Detected Not Detected    Chlamydia pneumoniae Not Detected Not Detected    Mycoplasma pneumoniae Not Detected Not Detected    Human Rhinovirus/Enterovirus Not Detected Not Detected    Bordetella parapertussis (IU6280) Not Detected Not Detected   COVID/FLU A&B PCR    Collection Time: 06/17/24  5:22 PM   Result  Value Ref Range    Influenza A PCR Not Detected Not Detected    Influenza B PCR Not Detected Not Detected    SARS-CoV-2 PCR Not Detected Not Detected, Negative   Urinalysis, Reflex to Urine Culture    Collection Time: 06/17/24  5:34 PM    Specimen: Urine   Result Value Ref Range    Color, UA Colorless Yellow, Light-Yellow, Colorless, Straw, Dark-Yellow    Appearance, UA Clear Clear    Specific Gravity, UA 1.033 (H) 1.005 - 1.030    pH, UA 5.5 5.0 - 8.5    Protein, UA Negative Negative    Glucose, UA Normal Negative, Normal    Ketones, UA Negative Negative    Blood, UA Negative Negative    Bilirubin, UA Negative Negative    Urobilinogen, UA Normal 0.2, 1.0, Normal    Nitrites, UA Negative Negative    Leukocyte Esterase, UA Negative Negative    WBC, UA 0-5 None Seen, 0-2, 3-5, 0-5 /HPF    Bacteria, UA None Seen None Seen, Trace /HPF    Squamous Epithelial Cells, UA None Seen None Seen /HPF    RBC, UA None Seen None Seen, 0-2, 3-5, 0-5 /HPF   Body Fluid Culture    Collection Time: 06/17/24  9:56 PM    Specimen: Liver; Body Fluid   Result Value Ref Range    Body Fluid Culture No Growth At 24 Hours    Comprehensive Metabolic Panel    Collection Time: 06/18/24  4:15 AM   Result Value Ref Range    Sodium 132 (L) 136 - 145 mmol/L    Potassium 4.4 3.5 - 5.1 mmol/L    Chloride 102 98 - 107 mmol/L    CO2 21 (L) 22 - 29 mmol/L    Glucose 129 (H) 74 - 100 mg/dL    Blood Urea Nitrogen 14.9 8.9 - 20.6 mg/dL    Creatinine 0.95 0.73 - 1.18 mg/dL    Calcium 9.3 8.4 - 10.2 mg/dL    Protein Total 7.8 6.4 - 8.3 gm/dL    Albumin 2.8 (L) 3.5 - 5.0 g/dL    Globulin 5.0 (H) 2.4 - 3.5 gm/dL    Albumin/Globulin Ratio 0.6 (L) 1.1 - 2.0 ratio    Bilirubin Total 0.8 <=1.5 mg/dL     (H) 40 - 150 unit/L     (H) 0 - 55 unit/L     (H) 5 - 34 unit/L    eGFR >60 mL/min/1.73/m2    Anion Gap 9.0 mEq/L    BUN/Creatinine Ratio 16    Magnesium    Collection Time: 06/18/24  4:15 AM   Result Value Ref Range    Magnesium Level 2.30 1.60 -  2.60 mg/dL   Phosphorus    Collection Time: 06/18/24  4:15 AM   Result Value Ref Range    Phosphorus Level 5.2 (H) 2.3 - 4.7 mg/dL   CBC with Differential    Collection Time: 06/18/24  4:15 AM   Result Value Ref Range    WBC 14.66 (H) 4.50 - 11.50 x10(3)/mcL    RBC 4.17 (L) 4.70 - 6.10 x10(6)/mcL    Hgb 12.4 (L) 14.0 - 18.0 g/dL    Hct 38.1 (L) 42.0 - 52.0 %    MCV 91.4 80.0 - 94.0 fL    MCH 29.7 27.0 - 31.0 pg    MCHC 32.5 (L) 33.0 - 36.0 g/dL    RDW 13.8 11.5 - 17.0 %    Platelet 565 (H) 130 - 400 x10(3)/mcL    MPV 9.4 7.4 - 10.4 fL    Neut % 88.0 %    Lymph % 6.2 %    Mono % 4.8 %    Eos % 0.3 %    Basophil % 0.3 %    Lymph # 0.91 0.6 - 4.6 x10(3)/mcL    Neut # 12.88 (H) 2.1 - 9.2 x10(3)/mcL    Mono # 0.71 0.1 - 1.3 x10(3)/mcL    Eos # 0.05 0 - 0.9 x10(3)/mcL    Baso # 0.05 <=0.2 x10(3)/mcL    IG# 0.06 (H) 0 - 0.04 x10(3)/mcL    IG% 0.4 %    NRBC% 0.0 %       CT Chest Abdomen Pelvis With IV Contrast (XPD) NO Oral Contrast    Result Date: 6/17/2024  EXAMINATION: CT CHEST ABDOMEN PELVIS WITH IV CONTRAST (XPD) CLINICAL HISTORY: h/o hepatic abscess, infectious work up; TECHNIQUE: Multidetector axial images were obtained from the thoracic inlet through the greater trochanters following the administration of IV contrast. Dose length product of 976 mGycm. Automated exposure control was utilized to minimize radiation dose. COMPARISON: [place] 10, 2024 CHEST FINDINGS: There are bilateral lower lung lobes posterior segment airspace opacities which suggest combination of atelectasis and mild infiltrates and show interval progression.  Interval resolution of pleural effusions.  Lungs otherwise are clear.  There is no pulmonary edema.  No fluid within pericardial space.  There are no enlarging chest lymph nodes.  Thoracic aorta is without aneurysmal dilatation.  Cardiac chamber size is within normal limits. ABDOMINAL FINDINGS: Right hepatic lobe anterior segment complex appearing hypodense collection shows interval size  improvement and now measures 2.3 x 3.0 cm and this previously was 4.3 x 3.7 cm.  Right hepatic lobe another posterior aspect septated collection also shows size improvement and now measures 2.6 x 3.5 cm and this previously measured 3.7 x 3.2 cm.  Large septated complex collection centered within the chordal lobe of liver also shows improvement and now measures 5.0 x 5.0 cm and this previously was 6.7 x 7.0 cm.  This contains the end of the drainage catheter.  No new hepatic lesion or collection.  Hepatic volume is within normal limits. Pancreas spleen are unremarkable.  No acute findings of the gallbladder.  Adrenals are unremarkable.  Kidneys function drain well.  There are no intra-abdominopelvic enlarged lymph nodes. Stomach is decompressed.  No abnormal dilatation of loops of small bowel and there is no focal or generalized mural thickening appendix is unremarkable.  There is noninflamed diverticulosis coli without pericolonic acute strandings PELVIC FINDINGS: Urinary bladder wall is not thickened.  Prostate is not enlarged in size.  No pelvic free fluid. No acute or aggressive skeletal abnormality.     1.  Bilateral mild progression of lower lung lobe posterior subpleural atelectasis with concern for some infiltrates especially on the right. 2.  Improved hepatic complex collections.  No new abdominopelvic findings. Electronically signed by: Taqueria Thompson Date:    06/17/2024 Time:    17:32    Echo    Result Date: 6/14/2024    Left Ventricle: The left ventricle is normal in size. Normal wall thickness. There is normal systolic function with a visually estimated ejection fraction of 60 - 65%. There is normal diastolic function.   Right Ventricle: Normal right ventricular cavity size. Wall thickness is normal. Systolic function is normal.   Aortic Valve: There is mild aortic regurgitation.   Mitral Valve: There is mild regurgitation.   IVC/SVC: Normal venous pressure at 3 mmHg.     X-Ray Chest 1 View for Line/Tube  Placement    Result Date: 6/13/2024  EXAMINATION: XR CHEST 1 VIEW FOR LINE/TUBE PLACEMENT CLINICAL HISTORY: picc line placement; FINDINGS: Single view of the chest.  Comparison 06/07/2024 Cardiac silhouette is normal.  The lungs demonstrate no evidence of active disease.  No evidence of pleural effusion or pneumothorax.  Bones appear intact.  Right-sided PICC line tip overlies the SVC in good position.     No acute process seen. Electronically signed by: Raymundo Saunders MD Date:    06/13/2024 Time:    14:27    CT Guided Needle Placement    Result Date: 6/12/2024  EXAMINATION: CT GUIDED NEEDLE PLACEMENT CLINICAL HISTORY: liver abscess; TECHNIQUE: Medications: Moderate sedation using versed and fentanyl was provided with and trained observer monitoring the patient's vital signs and level of consciousness. The total time of sedation was 20 minutes. 1% lidocaine locally :CR Saunders Complications: None COMPARISON: 06/10/2024 FINDINGS: Procedure: The risks and benefits of this procedure were discussed with the patient, written informed consent was obtained.  The patient was placed prone on the CT gantry.  Preprocedural imaging revealed hypodense lesions scattered within the liver largest within the caudate lobe.  IV fentanyl and Versed was given for conscious sedation.  The skin site was prepped and draped in sterile fashion.  The skin was anesthetized with 1% lidocaine. Using CT guidance a 19-gauge introducer needle was guided into the caudate collection.  A 0.035 in wire was placed into the collection and CT images confirmed appropriate positioning.  The tract was dilated to 8 Slovak.  An 8 Slovak APD catheter was placed in good position.  Post images demonstrated appropriate positioning of the catheter within the collection.  Catheter was secured at the skin surface and connected to a bag.  A total of 30 cc of bloody purulent material was obtained.  Fluid was sent for cultures and cytology. Postprocedural  imaging was acquired. The site was bandaged sterilely. The patient left the room in stable condition.     CT guided drain of a caudate lobe abscess.  Recommend monitoring out puts and repeat imaging or tube removal can be obtained when out puts are less than 10-20 cc per day.  Recommend flushing of the catheter with 5 cc of sterile saline Q shift. All CT scans at this facility use dose modulation, iterative reconstruction, and/or weight based dosing when appropriate to reduce radiation dose to as low as reasonable achievable. Electronically signed by: Raymundo Saunders MD Date:    06/12/2024 Time:    15:19    CT Abdomen Pelvis With IV Contrast NO Oral Contrast    Result Date: 6/10/2024  EXAMINATION: CT ABDOMEN PELVIS WITH IV CONTRAST CLINICAL HISTORY: Abdominal abscess/infection suspected;liver abscess; Sepsis, unspecified organism TECHNIQUE: Low dose axial images, sagittal and coronal reformations were obtained from the lung bases to the pubic symphysis following the IV administration of contrast. Automatic exposure control (AEC) is utilized to reduce patient radiation exposure. COMPARISON: 06/07/2024 FINDINGS: There is bibasilar atelectasis.  There are small bilateral pleural effusions. There are multiple hypoattenuated liver lesions seen which was seen on the prior examination as well.  Liver lesion is seen in the dome of the liver on image 30 series 2.  It measures 4.3 x 3.7 cm.  This area measured 3 cm x 2.7 cm on the prior examination.  Another lesion is seen in the dome of the liver in segment 8 medially.  It measures 3.7 x 3.2 cm.  Previously this area measured 3.2 x 2.6 cm.  There is a large hypoattenuated heterogeneous area seen in the caudate that measures 6.7 x 7 cm.  Previously this area measured 5 cm x 5.3 cm.  These areas were shown to be liver abscesses on prior examination.  Findings are consistent with progression. The gallbladder appears normal.  No obvious gallstones are seen.  No biliary dilatation  is seen.  No pericholecystic fluid is seen. The pancreas appears normal.  No pancreatic mass or lesion is seen. The spleen shows no acute abnormality. The adrenal glands appear normal.  No adrenal nodule is seen. The kidneys appear normal.  No hydronephrosis is seen.  No hydroureter is seen.  No nephrolithiasis is seen.  No obvious ureteral stones are seen. Urinary bladder appears grossly unremarkable. No colitis is seen.  No diverticulitis is seen.  No obvious colonic mass or lesion is seen.  The appendix appears normal. No free air is seen.  No free fluid is seen.     Enlarging liver lesions as outlined above.  These were shown to be likely liver abscesses on prior biopsy.  Findings are consistent with enlarging liver abscesses. Interval development of bibasilar atelectasis and small effusions Electronically signed by: Caleb Pena Date:    06/10/2024 Time:    15:31    CT Biopsy Liver (xpd)    Addendum Date: 6/7/2024    The above report is 4 drainage.  Correct report below. Examination: IR biopsy Clinical history: Hepatic lesions which may be infectious.  Malignancy not excluded Technique: CT-guided biopsy of right hepatic lesion. DLP: 418 Comparison: CT from earlier. Findings: The risk and benefits were explained to the patient expressed understanding.  The soft tissues were anesthetized with a lidocaine solution.  Under CT guidance a needle was advanced to the right hepatic lesion.  Two biopsies were obtained.  The pathologist stated predominantly neutrophils.  The second biopsy was sent to microbiology for culture. Impression: Successful biopsy. Electronically signed by: Kvng Yu Date:    06/07/2024 Time:    14:59    Result Date: 6/7/2024  EXAMINATION: CT BIOPSY LIVER (XPD) CLINICAL HISTORY: liver lesion/infectious process; ANESTHESIA: Lidocaine was utilized for local anesthesia. TECHNIQUE: CT guided drainage of a right hepatic collection. DLP: 418 COMPARISON: No relevant prior available for comparison.  FINDINGS: The risks, benefits, and potential complications were discussed including bleeding, infection, end organ damage. Informed consent was obtained. The patient was positioned in the left lateral decubitus position. The right hepatic abscess was localized CT.  The skin was marked and the area was prepped and draped in a sterile fashion. The site was anesthetized with 1% lidocaine solution. A 19 gauge, 5 cm coaxial needle was advanced into the collection, and a guidewire was then placed through the needle. The track was then dilated and a 12 Luxembourger drain was placed. The drain was sutured to the skin and attached to a KARINA bulb. There was 120 cc of pus fluid drained. The patient tolerated the procedure without difficulty. Post-procedure imaging demonstrated a well-positioned catheter.  There were no immediate post-procedure complications. The patient was discharged to his room in stable condition.     Successful placement of a 12 [place] drain into the right hepatic abscess.  Samples were collected and sent to the lab for analysis. Electronically signed by: Kvng Yu Date:    06/07/2024 Time:    14:54    CT Abdomen Pelvis With IV Contrast NO Oral Contrast    Result Date: 6/7/2024  EXAMINATION: CT ABDOMEN PELVIS WITH IV CONTRAST CLINICAL HISTORY: RUQ pain; TECHNIQUE: Helical acquisition through the abdomen and pelvis with IV contrast.  Three plane reconstructions were provided for review.  mGycm. Automatic exposure control, adjustment of mA/kV or iterative reconstruction technique was used to reduce radiation. COMPARISON: No prior FINDINGS: There is bibasilar atelectasis or scarring. There are 3 hypoattenuating liver lesions.  The largest is in the caudate measuring up to about 5 cm.  Patent portal vein. No significant abnormality of the gallbladder, spleen, pancreas or adrenals.  No hydronephrosis or suspicious renal lesion. No bowel obstruction.  Normal appendix.  Some rectal wall thickening is favored  related to under distension.  No significantly enlarged mesenteric lymph nodes. Urinary bladder not well distended.  No pelvic free fluid.  Abdominal aorta normal in caliber. There are no acute osseous findings.     Hypoattenuating liver lesions could relate to metastatic disease or infection. Electronically signed by: Ivan Mckeon Date:    06/07/2024 Time:    11:43    X-Ray Chest AP Portable    Result Date: 6/7/2024  EXAMINATION: XR CHEST AP PORTABLE CLINICAL HISTORY: Fever, unspecified COMPARISON: No priors FINDINGS: Frontal view of the chest was obtained. The heart is not enlarged.  Lungs are grossly clear.  There is no pneumothorax or significant effusion.     No acute findings. Electronically signed by: Ivan Mckeon Date:    06/07/2024 Time:    06:00      Imaging personally reviewed by myself and SP.    Assessment / Plan:     43-year-old male known to our group no past medical history. He underwent CT of liver biopsy with IR done 6/7 cultures were positive for Klebsiella pneumoniae.  He was eventually transferred to [facility] for IR for hepatic abscess drainage, as repeat imaging showed liver lesions appear to be enlarging and he had hepatic drain place 6/12. Presented back to ER with complaints of Fever at home. He was admitted with sepsis 2/2 klebsiella liver abscess    GI consulted for endoscopy evaluation for liver abscess.  Liver abscess with drain  Repeat CT abd/pelv with IV: shows improvement in hepatic complex collections with no new hepatic lesion or collections.     No plans for endoscopic intervention at this time given improvement in collections post drainage catheter.     Thank you for allowing us to participate in this patient's care.   Joy Houser Np acting as scribe for Dr. JANICE Perez MD.

## 2024-06-18 NOTE — PLAN OF CARE
06/18/24 1055   Discharge Assessment   Assessment Type Discharge Planning Assessment   Confirmed/corrected address, phone number and insurance Yes   Confirmed Demographics Correct on Facesheet   Source of Information patient;family  (Aixa Montoya (Spouse)  [phone] (Mobile))   If unable to respond/provide information was family/caregiver contacted? Yes   Contact Name/Number Aixa Montoya (Spouse)  [phone] (Mobile) the patient's wife was at his bedside upon assessment.   Communicated CORY with patient/caregiver Date not available/Unable to determine   Reason For Admission 43-year-old male who was recently hospitalized on 06/07/2024 with 5 day history of fever, epigastric right upper quadrant and pleuritic right lower chest pain, further workup revealed Klebsiella bacteremia and liver abscess and antibiotic eventually transitioned to cefepime and was transferred to [place] on 06/12/2024 and underwent IR hepatic abscess drain placement subsequently antibiotic transitioned to ceftriaxone 2 g IV q.12 hours and was discharged home on 06/14/2024.  Report the next day noted decreased drainage from the drain and home health nurse flush the catheter and the same night he started having chills and fever which continued for the next couple of days.   People in Home spouse  (Aixa Montoya (Spouse)  [phone] (Mobile))   Facility Arrived From: Private residence.   Do you expect to return to your current living situation? Yes   Do you have help at home or someone to help you manage your care at home? Yes   Who are your caregiver(s) and their phone number(s)? Wife:  Aixa Montoya (Spouse)  [phone] (Mobile)   Prior to hospitilization cognitive status: Alert/Oriented   Current cognitive status: Alert/Oriented   Walking or Climbing Stairs Difficulty no   Dressing/Bathing Difficulty no   Home Accessibility   (The patient's home is built on a slab.)   Home Layout Able to live on 1st floor    Readmission within 30 days? Yes   Patient currently being followed by outpatient case management? No   Do you currently have service(s) that help you manage your care at home? Yes   Name and Contact number of agency The patient is active with home health services through: [facility] and he was also receiving home IV (ABX) [facility] therapy.   Is the pt/caregiver preference to resume services with current agency Yes   Do you take prescription medications? Yes   Do you have prescription coverage? Yes   Coverage Financial Class: Commercial  Payor: BLUE CROSS BLUE SHIELD - [facility] ALL OUT OF STATE -   Do you have any problems affording any of your prescribed medications? No   Is the patient taking medications as prescribed? yes   Who is going to help you get home at discharge? Wife:  Aixa Montoya (Spouse)  [phone] (Mobile)   How do you get to doctors appointments? family or friend will provide   Are you on dialysis? No   Do you take coumadin? No   Discharge Plan A Home Health  (FOC: The patient is active with home health services through: [facility] and he was also receiving IV (ABX) therapy through [facility].)   Discharge Plan B Home Health   DME Needed Upon Discharge  none   Discharge Plan discussed with: Patient;Spouse/sig other   Name(s) and Number(s) Wife: Aixa Montoya (Spouse)  [phone] (Mobile)   Transition of Care Barriers None   Physical Activity   On average, how many days per week do you engage in moderate to strenuous exercise (like a brisk walk)? 5 days   On average, how many minutes do you engage in exercise at this level? 30 min   Financial Resource Strain   How hard is it for you to pay for the very basics like food, housing, medical care, and heating? Not very   Housing Stability   In the last 12 months, was there a time when you were not able to pay the mortgage or rent on time? N   At any time in the past 12 months, were you homeless or living in a shelter (including  now)? N   Transportation Needs   Has the lack of transportation kept you from medical appointments, meetings, work or from getting things needed for daily living? No   Food Insecurity   Within the past 12 months, you worried that your food would run out before you got the money to buy more. Never true   Within the past 12 months, the food you bought just didn't last and you didn't have money to get more. Never true   Stress   Do you feel stress - tense, restless, nervous, or anxious, or unable to sleep at night because your mind is troubled all the time - these days? Only a littl   Social Isolation   How often do you feel lonely or isolated from those around you?  Never   Alcohol Use   Q1: How often do you have a drink containing alcohol? Monthly or l   Q2: How many drinks containing alcohol do you have on a typical day when you are drinking? 3 or 4   Q3: How often do you have six or more drinks on one occasion? Never   Utilities   In the past 12 months has the electric, gas, oil, or water company threatened to shut off services in your home? No   Health Literacy   How often do you need to have someone help you when you read instructions, pamphlets, or other written material from your doctor or pharmacy? Never   OTHER   Name(s) of People in Home Wife: Aixa Montoya (Spouse)  [phone] (Mobile)     HH: [facility] (Active)   IV (ABX): [facility].

## 2024-06-18 NOTE — PLAN OF CARE
Problem: Adult Inpatient Plan of Care  Goal: Plan of Care Review  Outcome: Progressing  Goal: Patient-Specific Goal (Individualized)  Outcome: Progressing  Goal: Absence of Hospital-Acquired Illness or Injury  Outcome: Progressing  Goal: Optimal Comfort and Wellbeing  Outcome: Progressing  Goal: Readiness for Transition of Care  Outcome: Progressing     Problem: Sepsis/Septic Shock  Goal: Optimal Coping  Outcome: Progressing  Goal: Absence of Bleeding  Outcome: Progressing  Goal: Blood Glucose Level Within Targeted Range  Outcome: Progressing  Goal: Absence of Infection Signs and Symptoms  Outcome: Progressing  Goal: Optimal Nutrition Intake  Outcome: Progressing     Problem: Infection  Goal: Absence of Infection Signs and Symptoms  Outcome: Progressing     Problem: Wound  Goal: Optimal Coping  Outcome: Progressing  Goal: Optimal Functional Ability  Outcome: Progressing  Goal: Absence of Infection Signs and Symptoms  Outcome: Progressing  Goal: Improved Oral Intake  Outcome: Progressing  Goal: Optimal Pain Control and Function  Outcome: Progressing  Goal: Skin Health and Integrity  Outcome: Progressing  Goal: Optimal Wound Healing  Outcome: Progressing

## 2024-06-18 NOTE — H&P
[facility] Hospital Medicine - H&P Note    Patient Name: Nba Montoya  MRN: [id number]  PCP: No, Primary Doctor  Admitting Physician: Lidia Campbell MD  Admission Class: IP- Inpatient   Date of Service: 06/17/2024  Code status: Full    Chief Complaint   Fever    History of Present Illness   This is a 43-year-old male who was recently hospitalized on 06/07/2024 with 5 day history of fever, epigastric right upper quadrant and pleuritic right lower chest pain, further workup revealed Klebsiella bacteremia and liver abscess and antibiotic eventually transitioned to cefepime and was transferred to [place] on 06/12/2024 and underwent IR hepatic abscess drain placement subsequently antibiotic transitioned to ceftriaxone 2 g IV q.12 hours and was discharged home on 06/14/2024.  Report the next day noted decreased drainage from the drain and home health nurse flush the catheter and the same night he started having chills and fever which continued for the next couple of days, notified Infectious Disease who instructed him to present to the ED.    On arrival to ED he was initially afebrile and hemodynamically stable but throughout the night he spiked a temperature 100.6 degrees, labs notable for leukocytosis , CRP 76, normal total bilirubin and elevated transaminases, COVID 19 and flu not detected, urinalysis unremarkable, normal lactic acid.    CT abdomen pelvis show drain in place and decreased and liver abscess size, , mild bilateral lower lobe posterior subpleural atelectasis with concern for some infiltrate especially on the right.    Infectious disease Dr. Petersen consulted and recommended continuing ceftriaxone at this time and will evaluate in a.m..    ROS   Except as documented, all other systems reviewed and negative     Past Medical History   Klebsiella pneumoniae bacteremia and liver abscess  Hyperlipidemia    Surgical History   IR percutaneous hepatic abscess drain  placement    Social History   Denies smoking, alcohol or illicit drug use    Screening for social drivers of health:  Patient screened for food insecurity, housing instability, transportation needs, utility difficulties, and interpersonal safety:  []Housing or food  []Transportation needs  []Utility difficulties  []Interpersonal safety  [x]None    Family History   Reviewed and negative    Allergies   Patient has no known allergies.    Home Medications     Prior to Admission medications    Medication Sig Start Date End Date Taking? Authorizing Provider   dextrose 5 % in water (D5W) PgBk 100 mL with cefTRIAXone 2 gram SolR 2 g Inject 2 g into the vein every 12 (twelve) hours. 6/14/24  Yes Cristine Petersen, NP   famotidine (PEPCID) 20 MG tablet Take 1 tablet (20 mg total) by mouth 2 (two) times daily. 6/14/24 6/14/25 Yes Cristine Petersen, NP   ondansetron (ZOFRAN-ODT) 4 MG TbDL Dissolve 1 tablet (4 mg total) by mouth 2 (two) times daily. 6/14/24  Yes Cristine Petersen, SHERRI        Physical Exam   Vital Signs  Temp:  [97.9 °F (36.6 °C)-98.6 °F (37 °C)]   Pulse:  [90-99]   Resp:  [17-33]   BP: (122-145)/(77-94)   SpO2:  [93 %-96 %]    General: Appears comfortable, diaphoretic  HEENT: NC/AT  Neck:  No JVD  Chest: CTABL  CVS: Regular rhythm. Normal S1/S2.  Abdomen: nondistended, normoactive BS, soft, right upper quadrant drain in place, minimal amount of purulence in the J-pouch  MSK: No obvious deformity or joint swelling  Skin: Warm and dry  Neuro: AAOx3, no focal neurological deficit  Psych: Cooperative    Labs     Recent Labs     06/17/24  1245 06/17/24  1615   WBC 12.11* 12.35*   RBC 4.19* 4.58*   HGB 12.4* 13.7*   HCT 38.6* 41.5*   MCV 92.1 90.6   MCH 29.6 29.9   MCHC 32.1* 33.0   RDW 14.1 13.9   * 653*     Recent Labs     06/17/24  1245   SEDRATE 109*   HSCRP 70.89*     Recent Labs     06/17/24  1615   LABPROT 8.6*      Recent Labs     06/17/24  1245 06/17/24  1615    136   K 4.7 4.1   CO2 24 24   BUN 12.8  12.9   CREATININE 0.81 0.84   EGFRNORACEVR >60 >60   GLUCOSE 117* 117*   CALCIUM 8.7 9.6   ALBUMIN 2.8* 3.1*   GLOBULIN 4.4* 5.5*   ALKPHOS 254* 266*   ALT 88* 108*   AST 75* 95*   BILITOT 0.6 0.7     Recent Labs     06/17/24  1615   LACTATE 1.4     Recent Labs     06/17/24  1245   CPK 17*        Microbiology Results (last 7 days)       Procedure Component Value Units Date/Time    Body Fluid Culture [[id number]]     Order Status: Sent Specimen: Body Fluid from Liver     Blood culture #2 **CANNOT BE ORDERED STAT** [[id number]] Collected: 06/17/24 1615    Order Status: Resulted Specimen: Blood Updated: 06/17/24 1627    Blood culture #1 **CANNOT BE ORDERED STAT** [[id number]] Collected: 06/17/24 1615    Order Status: Resulted Specimen: Blood Updated: 06/17/24 1627           Imaging     CT Chest Abdomen Pelvis With IV Contrast (XPD) NO Oral Contrast   Final Result      1.  Bilateral mild progression of lower lung lobe posterior subpleural atelectasis with concern for some infiltrates especially on the right.      2.  Improved hepatic complex collections.  No new abdominopelvic findings.         Electronically signed by: Taqueria Thompson   Date:    06/17/2024   Time:    17:32          Assessment   Klebsiella pneumoniae liver abscess  S/P IR drain placement 06/12/2024  Sepsis secondary to above      Plan   Continue IV ceftriaxone 2 g IV q.12 hours  Repeat blood cultures x2  Supportive Care, Infectious Disease consult  VTE Prophylaxis:  Enoxaparin 40 mg subQ daily     Critical care time: 35 minutes  Critical care diagnosis: sepsis    Lidia Campbell MD  Internal Medicine  6/17/2024. at 9:09 PM.

## 2024-06-19 VITALS
TEMPERATURE: 99 F | RESPIRATION RATE: 18 BRPM | WEIGHT: 180 LBS | BODY MASS INDEX: 31.89 KG/M2 | HEIGHT: 63 IN | DIASTOLIC BLOOD PRESSURE: 86 MMHG | HEART RATE: 80 BPM | OXYGEN SATURATION: 95 % | SYSTOLIC BLOOD PRESSURE: 126 MMHG

## 2024-06-19 LAB
ALBUMIN SERPL-MCNC: 2.9 G/DL (ref 3.5–5)
ALBUMIN/GLOB SERPL: 0.7 RATIO (ref 1.1–2)
ALP SERPL-CCNC: 315 UNIT/L (ref 40–150)
ALT SERPL-CCNC: 160 UNIT/L (ref 0–55)
ANION GAP SERPL CALC-SCNC: 9 MEQ/L
AST SERPL-CCNC: 84 UNIT/L (ref 5–34)
BASOPHILS # BLD AUTO: 0.08 X10(3)/MCL
BASOPHILS NFR BLD AUTO: 0.8 %
BILIRUB SERPL-MCNC: 0.7 MG/DL
BUN SERPL-MCNC: 12 MG/DL (ref 8.9–20.6)
CALCIUM SERPL-MCNC: 9.3 MG/DL (ref 8.4–10.2)
CHLORIDE SERPL-SCNC: 103 MMOL/L (ref 98–107)
CO2 SERPL-SCNC: 23 MMOL/L (ref 22–29)
CREAT SERPL-MCNC: 0.76 MG/DL (ref 0.73–1.18)
CREAT/UREA NIT SERPL: 16
EOSINOPHIL # BLD AUTO: 0.15 X10(3)/MCL (ref 0–0.9)
EOSINOPHIL NFR BLD AUTO: 1.4 %
ERYTHROCYTE [DISTWIDTH] IN BLOOD BY AUTOMATED COUNT: 13.8 % (ref 11.5–17)
GFR SERPLBLD CREATININE-BSD FMLA CKD-EPI: >60 ML/MIN/1.73/M2
GLOBULIN SER-MCNC: 4.4 GM/DL (ref 2.4–3.5)
GLUCOSE SERPL-MCNC: 112 MG/DL (ref 74–100)
HCT VFR BLD AUTO: 38.6 % (ref 42–52)
HGB BLD-MCNC: 12.6 G/DL (ref 14–18)
IMM GRANULOCYTES # BLD AUTO: 0.03 X10(3)/MCL (ref 0–0.04)
IMM GRANULOCYTES NFR BLD AUTO: 0.3 %
LYMPHOCYTES # BLD AUTO: 1.94 X10(3)/MCL (ref 0.6–4.6)
LYMPHOCYTES NFR BLD AUTO: 18.5 %
MCH RBC QN AUTO: 29.8 PG (ref 27–31)
MCHC RBC AUTO-ENTMCNC: 32.6 G/DL (ref 33–36)
MCV RBC AUTO: 91.3 FL (ref 80–94)
MONOCYTES # BLD AUTO: 0.84 X10(3)/MCL (ref 0.1–1.3)
MONOCYTES NFR BLD AUTO: 8 %
NEUTROPHILS # BLD AUTO: 7.46 X10(3)/MCL (ref 2.1–9.2)
NEUTROPHILS NFR BLD AUTO: 71 %
NRBC BLD AUTO-RTO: 0 %
PLATELET # BLD AUTO: 509 X10(3)/MCL (ref 130–400)
PMV BLD AUTO: 9.6 FL (ref 7.4–10.4)
POTASSIUM SERPL-SCNC: 4.6 MMOL/L (ref 3.5–5.1)
PROT SERPL-MCNC: 7.3 GM/DL (ref 6.4–8.3)
RBC # BLD AUTO: 4.23 X10(6)/MCL (ref 4.7–6.1)
SODIUM SERPL-SCNC: 135 MMOL/L (ref 136–145)
WBC # BLD AUTO: 10.5 X10(3)/MCL (ref 4.5–11.5)

## 2024-06-19 PROCEDURE — 25000003 PHARM REV CODE 250: Performed by: INTERNAL MEDICINE

## 2024-06-19 PROCEDURE — 80053 COMPREHEN METABOLIC PANEL: CPT | Performed by: INTERNAL MEDICINE

## 2024-06-19 PROCEDURE — 85025 COMPLETE CBC W/AUTO DIFF WBC: CPT | Performed by: INTERNAL MEDICINE

## 2024-06-19 PROCEDURE — 36415 COLL VENOUS BLD VENIPUNCTURE: CPT | Performed by: INTERNAL MEDICINE

## 2024-06-19 PROCEDURE — 99223 1ST HOSP IP/OBS HIGH 75: CPT | Mod: ,,, | Performed by: GENERAL PRACTICE

## 2024-06-19 PROCEDURE — 63600175 PHARM REV CODE 636 W HCPCS: Performed by: GENERAL PRACTICE

## 2024-06-19 PROCEDURE — 25000003 PHARM REV CODE 250: Performed by: GENERAL PRACTICE

## 2024-06-19 RX ADMIN — CEFTRIAXONE SODIUM 2 G: 2 INJECTION, POWDER, FOR SOLUTION INTRAMUSCULAR; INTRAVENOUS at 09:06

## 2024-06-19 RX ADMIN — FAMOTIDINE 20 MG: 20 TABLET, FILM COATED ORAL at 08:06

## 2024-06-19 NOTE — PLAN OF CARE
06/19/24 1600   Final Note   Assessment Type Final Discharge Note   Anticipated Discharge Disposition Home-Health  (FOC: The patient will resume home health services through: [facility] as he was active with this agency prior to this hospitalization. Clinical notes/updates and AVS and D/C Summary were uploaded and sent via [employer]. He will continue with home IV (ABX).)   Hospital Resources/Appts/Education Provided Appointment suggestion unavailable   Post-Acute Status   Post-Acute Authorization Home Health   Home Health Status Set-up Complete/Auth obtained   Coverage Payor: [facility] ALL OUT OF STATE -   Patient choice form signed by patient/caregiver List with quality metrics by geographic area provided   Discharge Delays None known at this time     The patient will be discharged from [facility] to his private residence with  services through: [facility]. He will continue with home IV (ABX) as prior to this hospitalization through: [facility]: [phone]. The patient and his wife report that they have received another (7) day supply of IV (ABX): Ceftriaxone 2GM/50 ml delivered to their private residence on yesterday. The patient and his wife were informed of the above D/C plans and both are in agreement. Transportation will be arranged by family member's personal vehicle.

## 2024-06-19 NOTE — PROGRESS NOTES
[facility] MEDICINE ~ PROGRESS NOTE        CHIEF COMPLAINT   Hospital follow up    HOSPITAL COURSE   43-year-old male who was recently hospitalized on 06/07/2024 with 5 day history of fever, epigastric right upper quadrant and pleuritic right lower chest pain, further workup revealed Klebsiella bacteremia and liver abscess and antibiotic eventually transitioned to cefepime and was transferred to [place] on 06/12/2024 and underwent IR hepatic abscess drain placement subsequently antibiotic transitioned to ceftriaxone 2 g IV q.12 hours and was discharged home on 06/14/2024.  Report the next day noted decreased drainage from the drain and home health nurse flush the catheter and the same night he started having chills and fever which continued for the next couple of days, notified Infectious Disease who instructed him to present to the ED.     On arrival to ED he was initially afebrile and hemodynamically stable but throughout the night he spiked a temperature 100.6 degrees, labs notable for leukocytosis , CRP 76, normal total bilirubin and elevated transaminases, COVID 19 and flu not detected, urinalysis unremarkable, normal lactic acid.     CT abdomen pelvis show drain in place and decreased and liver abscess size, , mild bilateral lower lobe posterior subpleural atelectasis with concern for some infiltrate especially on the right.     Infectious disease Dr. Petersen consulted and recommended continuing ceftriaxone at this time and will evaluate in a.m..    Today  Afebrile for the last 24 hours.  Suspect his fevers may have been from the catheter flushing or the clogged catheter.  He is feeling better wants to go home.  Infectious Disease to see him today.        OBJECTIVE/PHYSICAL EXAM     VITAL SIGNS (MOST RECENT):  Temp: 98.5 °F (36.9 °C) (06/19/24 0812)  Pulse: 88 (06/19/24 0812)  Resp: 18 (06/19/24 0812)  BP: 107/67 (06/19/24 0812)  SpO2: (!) 94 %  (06/19/24 0812) VITAL SIGNS (24 HOUR RANGE):  Temp:  [98.2 °F (36.8 °C)-99 °F (37.2 °C)] 98.5 °F (36.9 °C)  Pulse:  [79-95] 88  Resp:  [18] 18  SpO2:  [94 %-97 %] 94 %  BP: (107-129)/(67-85) 107/67   GENERAL: In no acute distress, afebrile  HEENT:  CHEST: Clear to auscultation bilaterally  HEART: S1, S2, no appreciable murmur  ABDOMEN: Soft, nontender, BS +, right upper quadrant drain with purulence  MSK: Warm, no lower extremity edema, no clubbing or cyanosis  NEUROLOGIC: Alert and oriented x4, moving all extremities with good strength   INTEGUMENTARY:  PSYCHIATRY:        ASSESSMENT/PLAN   Liver abscess status post IR drain-improving  Sepsis 2/2 above   Fevers recurrence       GI signed off.  Outpatient colonoscopy recommended.  Monitor drain output, can flush if needed.  Continue Rocephin, infectious disease consulted    DVT prophylaxis:     Anticipated discharge and disposition:   __________________________________________________________________________    NUTRITIONAL STATUS     Patient meets ASPEN criteria for   malnutrition of   per RD assessment as evidenced by:                       A minimum of two characteristics is recommended for diagnosis of either severe or non-severe malnutrition.     LABS/MICRO/MEDS/DIAGNOSTICS       LABS  Recent Labs     06/19/24  0429   *   K 4.6   CO2 23   BUN 12.0   CREATININE 0.76   GLUCOSE 112*   CALCIUM 9.3   ALKPHOS 315*   AST 84*   *   ALBUMIN 2.9*     Recent Labs     06/19/24  0429   WBC 10.50   RBC 4.23*   HCT 38.6*   MCV 91.3   *       MICROBIOLOGY  Microbiology Results (last 7 days)       Procedure Component Value Units Date/Time    Blood culture #1 **CANNOT BE ORDERED STAT** [[id number]]  (Normal) Collected: 06/17/24 1615    Order Status: Completed Specimen: Blood Updated: 06/18/24 1701     Blood Culture No Growth At 24 Hours    Blood culture #2 **CANNOT BE ORDERED STAT** [[id number]]  (Normal) Collected: 06/17/24 1146    Order Status: Completed  Specimen: Blood Updated: 06/18/24 1701     Blood Culture No Growth At 24 Hours    Body Fluid Culture [[id number]] Collected: 06/17/24 2156    Order Status: Completed Specimen: Body Fluid from Liver Updated: 06/18/24 0637     Body Fluid Culture No Growth At 24 Hours               MEDICATIONS   cefTRIAXone (Rocephin) IV (PEDS and ADULTS)  2 g Intravenous Q24H    enoxparin  40 mg Subcutaneous Q24H (prophylaxis, 1700)    famotidine  20 mg Oral Daily         INFUSIONS         DIAGNOSTIC TESTS  CT Chest Abdomen Pelvis With IV Contrast (XPD) NO Oral Contrast   Final Result      1.  Bilateral mild progression of lower lung lobe posterior subpleural atelectasis with concern for some infiltrates especially on the right.      2.  Improved hepatic complex collections.  No new abdominopelvic findings.         Electronically signed by: Taqueria Thompson   Date:    06/17/2024   Time:    17:32           Echo    Result Date: 6/14/2024    Left Ventricle: The left ventricle is normal in size. Normal wall   thickness. There is normal systolic function with a visually estimated   ejection fraction of 60 - 65%. There is normal diastolic function.    Right Ventricle: Normal right ventricular cavity size. Wall thickness   is normal. Systolic function is normal.    Aortic Valve: There is mild aortic regurgitation.    Mitral Valve: There is mild regurgitation.    IVC/SVC: Normal venous pressure at 3 mmHg.              Case related differential diagnoses have been reviewed; assessment and plan has been documented. I have personally reviewed the labs and test results that are currently available; I have reviewed the patients medication list. I have reviewed the consulting providers recommendations. I have reviewed or attempted to review medical records based upon their availability.  All of the patient's and/or family's questions have been addressed and answered to the best of my ability.  I will continue to monitor closely and make adjustments to  medical management as needed.  This document was created using M*Modal Fluency Direct.  Transcription errors may have been made.  Please contact me if any questions may rise regarding documentation to clarify transcription.        Shmuel Castle MD   Internal Medicine  Department of Hospital Medicine [facility]

## 2024-06-19 NOTE — DISCHARGE SUMMARY
[facility] HOSPITAL MEDICINE - DISCHARGE SUMMARY    Patient Name: Nba Montoya  MRN: [id number]  Admission Date: 6/17/2024  Discharge Date: 06/19/2024  Hospital Length of Stay: 2 days  Discharge Provider: Shmuel Castle MD  Primary Care Provider: Huma Saleh DO      HOSPITAL COURSE   43-year-old male who was recently hospitalized on 06/07/2024 with 5 day history of fever, epigastric right upper quadrant and pleuritic right lower chest pain, further workup revealed Klebsiella bacteremia and liver abscess and antibiotic eventually transitioned to cefepime and was transferred to [place] on 06/12/2024 and underwent IR hepatic abscess drain placement subsequently antibiotic transitioned to ceftriaxone 2 g IV q.12 hours and was discharged home on 06/14/2024.  Report the next day noted decreased drainage from the drain and home health nurse flush the catheter and the same night he started having chills and fever which continued for the next couple of days, notified Infectious Disease who instructed him to present to the ED.     On arrival to ED he was initially afebrile and hemodynamically stable but throughout the night he spiked a temperature 100.6 degrees, labs notable for leukocytosis , CRP 76, normal total bilirubin and elevated transaminases, COVID 19 and flu not detected, urinalysis unremarkable, normal lactic acid.     CT abdomen pelvis show drain in place and decreased and liver abscess size, , mild bilateral lower lobe posterior subpleural atelectasis with concern for some infiltrate especially on the right.     Infectious disease Dr. Lloyd consulted and recommended continuing ceftriaxone at this time and will evaluate in a.m..      At this time he is feeling better, fevers resolved.  Fevers felt to be secondary to clogged drain.  Remains on same abx as recommended before.    Spoke with dr lloyd today and rohini for discharge as well.    I have asked surgery  to see and manage drain for us since he has nobody to do so.  Also requested  to flush drain twice daily to prevent clogging since its very thick purulence.          PHYSICAL EXAM     Most Recent Vital Signs:  Temp: 98.6 °F (37 °C) (06/19/24 1129)  Pulse: 90 (06/19/24 1129)  Resp: 18 (06/19/24 1129)  BP: 119/76 (06/19/24 1129)  SpO2: 95 % (06/19/24 1129)   GENERAL: In no acute distress, afebrile  HEENT:  CHEST: Clear to auscultation bilaterally  HEART: S1, S2, no appreciable murmur  ABDOMEN: Soft, nontender, BS +, RUQ drain with purulence  MSK: Warm, no lower extremity edema, no clubbing or cyanosis  NEUROLOGIC: Alert and oriented x4, moving all extremities with good strength   INTEGUMENTARY:  PSYCHIATRY:          DISCHARGE DIAGNOSIS   Liver abscess status post IR drain-improving  Sepsis 2/2 above   Fevers recurrence         _____________________________________________________________________________      DISCHARGE MED REC     Current Discharge Medication List        CONTINUE these medications which have CHANGED    Details   dextrose 5 % in water (D5W) PgBk 100 mL with cefTRIAXone 2 gram SolR 2 g Inject 2 g into the vein once daily.           CONTINUE these medications which have NOT CHANGED    Details   famotidine (PEPCID) 20 MG tablet Take 1 tablet (20 mg total) by mouth 2 (two) times daily.  Qty: 60 tablet, Refills: 0      ondansetron (ZOFRAN-ODT) 4 MG TbDL Dissolve 1 tablet (4 mg total) by mouth 2 (two) times daily.  Qty: 30 tablet, Refills: 0                CONSULTS     Consults (From admission, onward)          Status Ordering Provider     Inpatient consult to General Surgery  Once        Provider:  Kalpesh Barrios MD    Ordered JASON BURKS     Inpatient consult to Gastroenterology  Once        Provider:  Nguyễn Centeno MD    Completed JASON BURKS     Inpatient consult to Infectious Diseases  Once        Provider:  Edgar Petersen MD    Acknowledged ANGIE KEARNEY              FOLLOW UP      Contact  information for follow-up providers       Edgar Petersen MD Follow up.    Specialty: Infectious Diseases  Contact information:  [address] [place] [address] [address] [place] [address]  [phone]               Sur[facility] Follow up in 1 week(s).    Why: Outpatient liver drain management  Contact information:  [address] [place] [address]  [phone]                       Contact information for after-discharge care       Home Medical Care       [facility] .    Service: Home Health Services  Why: Flush drain twice daily  Contact information:  [address] [facility] [address]  [phone]                                       DISCHARGE INSTRUCTIONS     Explained in detail to the patient about the discharge plan, medications, and follow-up visits. Pt understands and agrees with the treatment plan.  Discharged Condition: stable  Diet as tolerated  Activities as tolerated  Discharge to: [facility]Health Care [facility]    TIME SPENT ON DISCHARGE   35 minutes        Shmuel Castle MD  Internal Medicine  Department of Hospital Medicine [facility]      This document was created using electronic dictation services.  Please excuse any errors that may have been made.  Contact me if any questions regarding documentation to clarify verbiage.

## 2024-06-19 NOTE — DISCHARGE INSTRUCTIONS
[facility]   [address] [place] [address]    Patient will continue home IV (ABX) as ordered prior to this hospitalization:   Ceftriaxone 2GM/50 ml NS MEGHANA     Note: The patient reports that he received a home delivery of IV (ABX) to his home on yesterday : 6/18/2024.

## 2024-06-19 NOTE — CONSULTS
Infectious Disease  Consult Note    Patient Name: Nba Montoya   MRN: [id number]   Admission Date: 6/17/2024   Hospital Length of Stay: 2 days  Attending Physician: Lidia Campbell MD   Primary Care Provider: Huma Saleh DO     Isolation Status: No active isolations       Assessment/Plan:       43 year old male patient, no significant PMH, presents with epigastric pain, pleuritic chest pain, fevers that have been ongoing for about 1 week. Noted to have hepatic abscess on admission s/p IR biopsy 06/07 which was positive for Klebsiella pneumoniae, could not leave a drain and however.  His admission blood cultures were also positive for the same organism, patient was started on antibiotics but continued to have high-grade fevers, likely due to lack of source control, patient needed to be transferred to [facility] in [place] where he underwent interventional radiology procedure for drain placement in hepatic abscess on 06/12/2024.  Those cultures were also positive for Klebsiella pneumoniae.  Patient was discharged to home on 06/14/2024.  He was seen by ID during his stay and recommended for continuation of ceftriaxone 2 g IV Q 24 hours.  After discharged to home, the patient reports that he started having fevers, and noted at the same time that the drain had no output.  He contacted Infectious Disease in [place] and was recommended to present to the emergency department.  Upon presentation he was noted to have T-max of a 100.6°.  Patient had drain evaluation and flushing, and output started up again, he has not had fevers since then, 48 hours fever free at this point, with negative blood cultures.  ID is consulted for assistance in management      Hepatic abscess  Klebsiella pneumoniae abscess  Klebsiella pneumoniae bacteremia    -febrile episodes likely related to drain malfunction, currently resolved, fevers resolved, blood cultures negative    Plan:  -continue ceftriaxone 2 g IV Q 24 hours  -will likely  require 3-4 weeks of antibiotics, he has a follow-up appointment with ID in [place] on 2024  -he has a follow-up appointment with us in early July as well, we are happy to continue assisting with his management locally as needed  -need surgical evaluation for assistance in outpatient drain management  -discussed with the patient and wife at bedside as well as primary team attending  -from infectious disease standpoint, reasonable to discharge home and monitor for further complications as outpatient      Thank you for your consult.  ID will sign off, please call with questions or concerns.     Antibiotics:  Ceftriaxone  - present    Portions of this note dictated using EMR integrated voice recognition software, and may be subject to voice recognition errors not corrected at proofreading. Please contact writer for clarification if needed.    Subjective:     Principal Problem: Abscess of liver     HPI:   43 year old male patient, no significant PMH, presents with epigastric pain, pleuritic chest pain, fevers that have been ongoing for about 1 week. Noted ot have hepatic abscess on admission s/p IR drainage . He lives in [facility] where he works as . He recently was on a fishing trip with family week prior with no reported injury and no other sick contacts. He also notes eating ground pork about 2 weeks prior and having subsequent diarrhea which only lasted 1 day. He had however been feeling well until about 1 week prior to presentation. He recently travelled to [facility] in 2024 to attend his mother's , stayed in [facility] and had no significant exposures. He has a dog at home but no other animal exposures. ID consulted for assistance in management.     Sitting comfortably at the edge of the bed, no fevers, no chills.  Denies any complaints at this time.  Reports feeling well, anxious to go home    No past medical history on file.     No past surgical  history on file.    Review of patient's allergies indicates:  No Known Allergies     Family History    Reviewed and noncontributory            Social History     Socioeconomic History    Marital status:      Social Determinants of Health     Financial Resource Strain: Low Risk  (6/18/2024)    Overall Financial Resource Strain (CARDIA)     Difficulty of Paying Living Expenses: Not very hard   Food Insecurity: No Food Insecurity (6/18/2024)    Hunger Vital Sign     Worried About Running Out of Food in the Last Year: Never true     Ran Out of Food in the Last Year: Never true   Transportation Needs: No Transportation Needs (6/18/2024)    TRANSPORTATION NEEDS     Transportation : No   Physical Activity: Sufficiently Active (6/18/2024)    Exercise Vital Sign     Days of Exercise per Week: 5 days     Minutes of Exercise per Session: 30 min   Stress: No Stress Concern Present (6/18/2024)    [employer] [place] [employer] - Occupational Stress Questionnaire     Feeling of Stress : Only a little   Housing Stability: Low Risk  (6/18/2024)    Housing Stability Vital Sign     Unable to Pay for Housing in the Last Year: No     Homeless in the Last Year: No        Lines/Drains/Airways       Peripherally Inserted Central Catheter Line  Duration             PICC Double Lumen 06/13/24 1355 right basilic 6 days              Drain  Duration                  Closed/Suction Drain 06/12/24 1431 Tube - 1 Lateral RUQ Bulb 8 Fr. 7 days              Peripheral Intravenous Line  Duration                  Peripheral IV - Single Lumen 06/17/24 1700 20 G Left Forearm 1 day                     Medication:  Medications Prior to Admission   Medication Sig    dextrose 5 % in water (D5W) PgBk 100 mL with cefTRIAXone 2 gram SolR 2 g Inject 2 g into the vein every 12 (twelve) hours.    famotidine (PEPCID) 20 MG tablet Take 1 tablet (20 mg total) by mouth 2 (two) times daily.    ondansetron (ZOFRAN-ODT) 4 MG TbDL Dissolve 1 tablet (4 mg  total) by mouth 2 (two) times daily.          Antimicrobials:  Antibiotics (From admission, onward)      Start     Stop Route Frequency Ordered    06/19/24 0900  cefTRIAXone (ROCEPHIN) 2 g in dextrose 5 % in water (D5W) 100 mL IVPB (MB+)         -- IV Every 24 hours (non-standard times) 06/18/24 1527             Antifungals (From admission, onward)      None            Antivirals (From admission, onward)      None               Review of Systems   Review of Systems   All other systems reviewed and are negative.        Objective:     Vital Signs (Most Recent):  Temp: 98.6 °F (37 °C) (06/19/24 1129)  Pulse: 90 (06/19/24 1129)  Resp: 18 (06/19/24 1129)  BP: 119/76 (06/19/24 1129)  SpO2: 95 % (06/19/24 1129)  Vital Signs (24h Range):  Temp:  [98.2 °F (36.8 °C)-99 °F (37.2 °C)] 98.6 °F (37 °C)  Pulse:  [82-95] 90  Resp:  [18] 18  SpO2:  [94 %-97 %] 95 %  BP: (107-129)/(67-85) 119/76      Weight:   Wt Readings from Last 1 Encounters:   06/17/24 81.6 kg (180 lb)      Body mass index is Body mass index is 31.89 kg/m².     Estimated Creatinine Clearance: Estimated Creatinine Clearance: 116 mL/min (based on SCr of 0.76 mg/dL).     Physical Exam  Physical Exam  Constitutional:       Appearance: Normal appearance.   HENT:      Head: Normocephalic and atraumatic.      Mouth/Throat:      Pharynx: No oropharyngeal exudate or posterior oropharyngeal erythema.   Eyes:      Extraocular Movements: Extraocular movements intact.      Pupils: Pupils are equal, round, and reactive to light.   Cardiovascular:      Rate and Rhythm: Normal rate and regular rhythm.      Heart sounds: No murmur heard.  Pulmonary:      Effort: No respiratory distress.      Breath sounds: No wheezing, rhonchi or rales.   Abdominal:      General: Bowel sounds are normal. There is no distension.      Palpations: Abdomen is soft.      Tenderness: There is no abdominal tenderness. There is no right CVA tenderness or left CVA tenderness.      Comments: Right flank  drain in place   Musculoskeletal:         General: No swelling or tenderness.      Cervical back: Neck supple. No rigidity or tenderness.   Lymphadenopathy:      Cervical: No cervical adenopathy.   Skin:     Findings: No lesion or rash.   Neurological:      General: No focal deficit present.      Mental Status: He is alert and oriented to person, place, and time. Mental status is at baseline.      Cranial Nerves: No cranial nerve deficit.      Motor: No weakness.   Psychiatric:         Mood and Affect: Mood normal.         Behavior: Behavior normal.           Significant Labs: CBC:   Recent Labs   Lab 06/18/24 0415 06/19/24 0429   WBC 14.66* 10.50   HGB 12.4* 12.6*   HCT 38.1* 38.6*   * 509*     CMP:   Recent Labs   Lab 06/18/24 0415 06/19/24 0429   * 135*   K 4.4 4.6    103   CO2 21* 23   BUN 14.9 12.0   CREATININE 0.95 0.76   CALCIUM 9.3 9.3   ALBUMIN 2.8* 2.9*   BILITOT 0.8 0.7   ALKPHOS 351* 315*   * 84*   * 160*         Microbiology Results (last 7 days)       Procedure Component Value Units Date/Time    Body Fluid Culture [[id number]] Collected: 06/17/24 2156    Order Status: Completed Specimen: Body Fluid from Liver Updated: 06/19/24 0925     Body Fluid Culture No Growth At 48 Hours    Blood culture #1 **CANNOT BE ORDERED STAT** [[id number]]  (Normal) Collected: 06/17/24 1615    Order Status: Completed Specimen: Blood Updated: 06/18/24 1701     Blood Culture No Growth At 24 Hours    Blood culture #2 **CANNOT BE ORDERED STAT** [[id number]]  (Normal) Collected: 06/17/24 1615    Order Status: Completed Specimen: Blood Updated: 06/18/24 1701     Blood Culture No Growth At 24 Hours             Significant Imaging: I have reviewed all pertinent imaging results/findings within the past 24 hours.      Edgar Petersen MD  Infectious Disease  [facility]

## 2024-06-20 DIAGNOSIS — K75.0 HEPATIC ABSCESS: ICD-10-CM

## 2024-06-20 DIAGNOSIS — R78.81 BACTEREMIA DUE TO KLEBSIELLA PNEUMONIAE: Primary | ICD-10-CM

## 2024-06-20 DIAGNOSIS — B96.1 BACTEREMIA DUE TO KLEBSIELLA PNEUMONIAE: Primary | ICD-10-CM

## 2024-06-21 ENCOUNTER — PATIENT MESSAGE (OUTPATIENT)
Dept: ADMINISTRATIVE | Facility: OTHER | Age: 44
End: 2024-06-21
Payer: COMMERCIAL

## 2024-06-21 LAB — BACTERIA FLD CULT: ABNORMAL

## 2024-06-22 ENCOUNTER — PATIENT MESSAGE (OUTPATIENT)
Dept: ADMINISTRATIVE | Facility: OTHER | Age: 44
End: 2024-06-22
Payer: COMMERCIAL

## 2024-06-22 LAB
BACTERIA BLD CULT: NORMAL
BACTERIA BLD CULT: NORMAL

## 2024-06-24 ENCOUNTER — LAB REQUISITION (OUTPATIENT)
Dept: LAB | Facility: HOSPITAL | Age: 44
End: 2024-06-24
Payer: COMMERCIAL

## 2024-06-24 DIAGNOSIS — K75.0 ABSCESS OF LIVER: ICD-10-CM

## 2024-06-24 LAB
ALBUMIN SERPL-MCNC: 3.2 G/DL (ref 3.5–5)
ALBUMIN/GLOB SERPL: 0.7 RATIO (ref 1.1–2)
ALP SERPL-CCNC: 192 UNIT/L (ref 40–150)
ALT SERPL-CCNC: 84 UNIT/L (ref 0–55)
ANION GAP SERPL CALC-SCNC: 10 MEQ/L
AST SERPL-CCNC: 37 UNIT/L (ref 5–34)
BASOPHILS # BLD AUTO: 0.08 X10(3)/MCL
BASOPHILS NFR BLD AUTO: 1.1 %
BILIRUB SERPL-MCNC: 0.4 MG/DL
BUN SERPL-MCNC: 11.6 MG/DL (ref 8.9–20.6)
CALCIUM SERPL-MCNC: 9.6 MG/DL (ref 8.4–10.2)
CHLORIDE SERPL-SCNC: 105 MMOL/L (ref 98–107)
CK SERPL-CCNC: 24 U/L (ref 30–200)
CO2 SERPL-SCNC: 23 MMOL/L (ref 22–29)
CREAT SERPL-MCNC: 0.89 MG/DL (ref 0.73–1.18)
CREAT/UREA NIT SERPL: 13
CRP SERPL HS-MCNC: 21.69 MG/L
EOSINOPHIL # BLD AUTO: 0.19 X10(3)/MCL (ref 0–0.9)
EOSINOPHIL NFR BLD AUTO: 2.7 %
ERYTHROCYTE [DISTWIDTH] IN BLOOD BY AUTOMATED COUNT: 13.2 % (ref 11.5–17)
ERYTHROCYTE [SEDIMENTATION RATE] IN BLOOD: 91 MM/HR (ref 0–15)
GFR SERPLBLD CREATININE-BSD FMLA CKD-EPI: >60 ML/MIN/1.73/M2
GLOBULIN SER-MCNC: 4.5 GM/DL (ref 2.4–3.5)
GLUCOSE SERPL-MCNC: 106 MG/DL (ref 74–100)
HCT VFR BLD AUTO: 40.1 % (ref 42–52)
HGB BLD-MCNC: 13.1 G/DL (ref 14–18)
IMM GRANULOCYTES # BLD AUTO: 0.03 X10(3)/MCL (ref 0–0.04)
IMM GRANULOCYTES NFR BLD AUTO: 0.4 %
LYMPHOCYTES # BLD AUTO: 1.67 X10(3)/MCL (ref 0.6–4.6)
LYMPHOCYTES NFR BLD AUTO: 23.9 %
MCH RBC QN AUTO: 29.8 PG (ref 27–31)
MCHC RBC AUTO-ENTMCNC: 32.7 G/DL (ref 33–36)
MCV RBC AUTO: 91.1 FL (ref 80–94)
MONOCYTES # BLD AUTO: 0.49 X10(3)/MCL (ref 0.1–1.3)
MONOCYTES NFR BLD AUTO: 7 %
NEUTROPHILS # BLD AUTO: 4.53 X10(3)/MCL (ref 2.1–9.2)
NEUTROPHILS NFR BLD AUTO: 64.9 %
NRBC BLD AUTO-RTO: 0 %
PLATELET # BLD AUTO: 461 X10(3)/MCL (ref 130–400)
PMV BLD AUTO: 9.7 FL (ref 7.4–10.4)
POTASSIUM SERPL-SCNC: 4.1 MMOL/L (ref 3.5–5.1)
PROT SERPL-MCNC: 7.7 GM/DL (ref 6.4–8.3)
RBC # BLD AUTO: 4.4 X10(6)/MCL (ref 4.7–6.1)
SODIUM SERPL-SCNC: 138 MMOL/L (ref 136–145)
WBC # BLD AUTO: 6.99 X10(3)/MCL (ref 4.5–11.5)

## 2024-06-24 PROCEDURE — 82550 ASSAY OF CK (CPK): CPT | Performed by: STUDENT IN AN ORGANIZED HEALTH CARE EDUCATION/TRAINING PROGRAM

## 2024-06-24 PROCEDURE — 86141 C-REACTIVE PROTEIN HS: CPT | Performed by: STUDENT IN AN ORGANIZED HEALTH CARE EDUCATION/TRAINING PROGRAM

## 2024-06-24 PROCEDURE — 85652 RBC SED RATE AUTOMATED: CPT | Performed by: STUDENT IN AN ORGANIZED HEALTH CARE EDUCATION/TRAINING PROGRAM

## 2024-06-24 PROCEDURE — 85025 COMPLETE CBC W/AUTO DIFF WBC: CPT | Performed by: STUDENT IN AN ORGANIZED HEALTH CARE EDUCATION/TRAINING PROGRAM

## 2024-06-24 PROCEDURE — 80053 COMPREHEN METABOLIC PANEL: CPT | Performed by: STUDENT IN AN ORGANIZED HEALTH CARE EDUCATION/TRAINING PROGRAM

## 2024-06-26 ENCOUNTER — OFFICE VISIT (OUTPATIENT)
Dept: INFECTIOUS DISEASES | Facility: CLINIC | Age: 44
End: 2024-06-26
Payer: COMMERCIAL

## 2024-06-26 DIAGNOSIS — K75.0 ABSCESS OF LIVER: ICD-10-CM

## 2024-06-26 DIAGNOSIS — K75.0 HEPATIC ABSCESS: Primary | ICD-10-CM

## 2024-06-26 DIAGNOSIS — I10 HYPERTENSION, UNSPECIFIED TYPE: ICD-10-CM

## 2024-06-26 PROCEDURE — 99214 OFFICE O/P EST MOD 30 MIN: CPT | Mod: 95,,, | Performed by: INTERNAL MEDICINE

## 2024-06-26 PROCEDURE — 1111F DSCHRG MED/CURRENT MED MERGE: CPT | Mod: CPTII,95,, | Performed by: INTERNAL MEDICINE

## 2024-06-26 NOTE — ASSESSMENT & PLAN NOTE
1) Infection:  Liver abscess     2) Discharge Antibiotics:     Intravenous antibiotics:IV Rocephin 2 gram daily      3) Therapy Duration:     3 weeks   Estimated end date of IV antibiotics:    07/05/24CPK (when on Daptomycin)  ESR  CRP     Please send all labs to [facility] .    06/26- will continue Rocephin and will  plan to do liver ultrasound before he ends the regime  Planned EOC -07/05/24

## 2024-06-26 NOTE — PROGRESS NOTES
This is a Tele ID note-audio/visual connection    Subjective     Patient ID: Nba Montoya is a 43 y.o. male.    Chief Complaint: follow up   HPI    Last ID note- 06/14/24      43 y.o. male who was transferred  from [facility] for higher level of care -for liver abscess .   after patient was found to have presence of liver abscesses on CT.  Cultures obtained positive for .  He was initially started on   cefepime, vancomycin, and metronidazole   CT guided biopsy -06/07- Klebsiella  Labs and imaging test  Blood culture- o6/07- Klebsiella      CT -06/10-There are multiple hypoattenuated liver lesions seen which was seen on the prior examination as well.  Liver lesion is seen in the dome of the liver on image 30 series 2.  It measures 4.3 x 3.7 cm.  This area measured 3 cm x 2.7 cm on the prior examination.  Another lesion is seen in the dome of the liver in segment 8 medially.  It measures 3.7 x 3.2 cm.  Previously this area measured 3.2 x 2.6 cm.  There is a large hypoattenuated heterogeneous area seen in the caudate that measures 6.7 x 7 cm.  Previously this area measured 5 cm x 5.3 cm.  These areas were shown to be liver abscesses on prior examination.  Findings are consistent with progression.   CBC -wbc 14     He was seen by IR and had -CT guided drain of a caudate lobe abscess. -06/12 06/26/24-  He was seen via Tele ID-  He was seen in the ED- 06/17 /24  as he had fever at home-    CT abdomen pelvis -06/17/24-  Right hepatic lobe anterior segment complex appearing hypodense collection shows interval size improvement and now measures 2.3 x 3.0 cm and this previously was 4.3 x 3.7 cm.  Right hepatic lobe another posterior aspect septated collection also shows size improvement and now measures 2.6 x 3.5 cm and this previously measured 3.7 x 3.2 cm.  Large septated complex collection centered within the chordal lobe of liver also shows improvement and now measures 5.0 x 5.0 cm and this previously was  6.7 x 7.0 cm.  This contains the end of the drainage catheter.  No new hepatic lesion or collection.  Hepatic volume is within normal limits.     Pancreas spleen are unremarkable.  No acute findings of the gallbladder.  Adrenals are unremarkable.  Kidneys function drain well.  There are no intra-abdominopelvic enlarged lymph nodes.     Stomach is decompressed.  No abnormal dilatation of loops of small bowel and there is no focal or generalized mural thickening appendix is unremarkable.  There is noninflamed diverticulosis coli without pericolonic acute strandings     show drain in place and decreased and liver abscess size, , mild bilateral lower lobe posterior subpleural atelectasis with concern for some infiltrate especially on the right.  He is on Rocephin 2 gram daily with planned EOC -07/05/24.         Review of Systems   Constitutional:  Negative for activity change, appetite change, chills, diaphoresis, fatigue and fever.   HENT:  Negative for dental problem and drooling.    Respiratory:  Negative for apnea and chest tightness.    Gastrointestinal:  Negative for abdominal distention and abdominal pain.   Musculoskeletal:  Negative for arthralgias and back pain.   Neurological:  Negative for dizziness.   Hematological:  Negative for adenopathy. Does not bruise/bleed easily.   Psychiatric/Behavioral:  Negative for agitation and behavioral problems.           Objective     Physical Exam  HENT:      Head: Normocephalic.      Nose: Nose normal.   Cardiovascular:      Rate and Rhythm: Normal rate.   Pulmonary:      Effort: Pulmonary effort is normal.   Neurological:      General: No focal deficit present.      Mental Status: He is alert and oriented to person, place, and time.            Assessment and Plan     Problem List Items Addressed This Visit       Abscess of liver     1) Infection:  Liver abscess     2) Discharge Antibiotics:     Intravenous antibiotics:IV Rocephin 2 gram daily      3) Therapy Duration:      3 weeks   Estimated end date of IV antibiotics:    07/05/24CPK (when on Daptomycin)  ESR  CRP     Please send all labs to [facility] .    06/26- will continue Rocephin and will  plan to do liver ultrasound before he ends the regime  Planned EOC -07/05/24          Hypertension     Follow primary team          Other Visit Diagnoses       Hepatic abscess    -  Primary    Relevant Orders    US Abdomen Limited

## 2024-07-01 ENCOUNTER — HOSPITAL ENCOUNTER (OUTPATIENT)
Dept: RADIOLOGY | Facility: HOSPITAL | Age: 44
Discharge: HOME | End: 2024-07-01
Attending: INTERNAL MEDICINE
Payer: COMMERCIAL

## 2024-07-01 ENCOUNTER — LAB REQUISITION (OUTPATIENT)
Dept: LAB | Facility: HOSPITAL | Age: 44
End: 2024-07-01
Payer: COMMERCIAL

## 2024-07-01 DIAGNOSIS — K75.0 HEPATIC ABSCESS: ICD-10-CM

## 2024-07-01 DIAGNOSIS — K75.0 ABSCESS OF LIVER: ICD-10-CM

## 2024-07-01 LAB
ALBUMIN SERPL-MCNC: 3.6 G/DL (ref 3.5–5)
ALBUMIN/GLOB SERPL: 0.8 RATIO (ref 1.1–2)
ALP SERPL-CCNC: 112 UNIT/L (ref 40–150)
ALT SERPL-CCNC: 49 UNIT/L (ref 0–55)
ANION GAP SERPL CALC-SCNC: 10 MEQ/L
AST SERPL-CCNC: 28 UNIT/L (ref 5–34)
BASOPHILS # BLD AUTO: 0.08 X10(3)/MCL
BASOPHILS NFR BLD AUTO: 0.8 %
BILIRUB SERPL-MCNC: 0.4 MG/DL
BUN SERPL-MCNC: 14.6 MG/DL (ref 8.9–20.6)
CALCIUM SERPL-MCNC: 9.7 MG/DL (ref 8.4–10.2)
CHLORIDE SERPL-SCNC: 105 MMOL/L (ref 98–107)
CK SERPL-CCNC: 26 U/L (ref 30–200)
CO2 SERPL-SCNC: 24 MMOL/L (ref 22–29)
CREAT SERPL-MCNC: 0.81 MG/DL (ref 0.73–1.18)
CREAT/UREA NIT SERPL: 18
CRP SERPL HS-MCNC: 18.8 MG/L
EOSINOPHIL # BLD AUTO: 0.4 X10(3)/MCL (ref 0–0.9)
EOSINOPHIL NFR BLD AUTO: 3.8 %
ERYTHROCYTE [DISTWIDTH] IN BLOOD BY AUTOMATED COUNT: 13.1 % (ref 11.5–17)
ERYTHROCYTE [SEDIMENTATION RATE] IN BLOOD: 81 MM/HR (ref 0–15)
GFR SERPLBLD CREATININE-BSD FMLA CKD-EPI: >60 ML/MIN/1.73/M2
GLOBULIN SER-MCNC: 4.3 GM/DL (ref 2.4–3.5)
GLUCOSE SERPL-MCNC: 98 MG/DL (ref 74–100)
HCT VFR BLD AUTO: 43.5 % (ref 42–52)
HGB BLD-MCNC: 14.1 G/DL (ref 14–18)
IMM GRANULOCYTES # BLD AUTO: 0.03 X10(3)/MCL (ref 0–0.04)
IMM GRANULOCYTES NFR BLD AUTO: 0.3 %
LYMPHOCYTES # BLD AUTO: 2.25 X10(3)/MCL (ref 0.6–4.6)
LYMPHOCYTES NFR BLD AUTO: 21.2 %
MCH RBC QN AUTO: 29.9 PG (ref 27–31)
MCHC RBC AUTO-ENTMCNC: 32.4 G/DL (ref 33–36)
MCV RBC AUTO: 92.2 FL (ref 80–94)
MONOCYTES # BLD AUTO: 0.77 X10(3)/MCL (ref 0.1–1.3)
MONOCYTES NFR BLD AUTO: 7.3 %
NEUTROPHILS # BLD AUTO: 7.09 X10(3)/MCL (ref 2.1–9.2)
NEUTROPHILS NFR BLD AUTO: 66.6 %
NRBC BLD AUTO-RTO: 0 %
PLATELET # BLD AUTO: 326 X10(3)/MCL (ref 130–400)
PMV BLD AUTO: 9.9 FL (ref 7.4–10.4)
POTASSIUM SERPL-SCNC: 4.3 MMOL/L (ref 3.5–5.1)
PROT SERPL-MCNC: 7.9 GM/DL (ref 6.4–8.3)
RBC # BLD AUTO: 4.72 X10(6)/MCL (ref 4.7–6.1)
SODIUM SERPL-SCNC: 139 MMOL/L (ref 136–145)
WBC # BLD AUTO: 10.62 X10(3)/MCL (ref 4.5–11.5)

## 2024-07-01 PROCEDURE — 85652 RBC SED RATE AUTOMATED: CPT | Performed by: STUDENT IN AN ORGANIZED HEALTH CARE EDUCATION/TRAINING PROGRAM

## 2024-07-01 PROCEDURE — 85025 COMPLETE CBC W/AUTO DIFF WBC: CPT | Performed by: STUDENT IN AN ORGANIZED HEALTH CARE EDUCATION/TRAINING PROGRAM

## 2024-07-01 PROCEDURE — 80053 COMPREHEN METABOLIC PANEL: CPT | Performed by: STUDENT IN AN ORGANIZED HEALTH CARE EDUCATION/TRAINING PROGRAM

## 2024-07-01 PROCEDURE — 76705 ECHO EXAM OF ABDOMEN: CPT | Mod: TC

## 2024-07-01 PROCEDURE — 82550 ASSAY OF CK (CPK): CPT | Performed by: STUDENT IN AN ORGANIZED HEALTH CARE EDUCATION/TRAINING PROGRAM

## 2024-07-01 PROCEDURE — 86141 C-REACTIVE PROTEIN HS: CPT | Performed by: STUDENT IN AN ORGANIZED HEALTH CARE EDUCATION/TRAINING PROGRAM

## 2024-07-02 ENCOUNTER — TELEPHONE (OUTPATIENT)
Dept: INFECTIOUS DISEASES | Facility: CLINIC | Age: 44
End: 2024-07-02
Payer: COMMERCIAL

## 2024-07-02 ENCOUNTER — OFFICE VISIT (OUTPATIENT)
Dept: SURGERY | Facility: CLINIC | Age: 44
End: 2024-07-02
Payer: COMMERCIAL

## 2024-07-02 DIAGNOSIS — K75.0 ABSCESS OF LIVER: Primary | ICD-10-CM

## 2024-07-02 PROCEDURE — 99213 OFFICE O/P EST LOW 20 MIN: CPT | Mod: ,,, | Performed by: SURGERY

## 2024-07-02 PROCEDURE — 1111F DSCHRG MED/CURRENT MED MERGE: CPT | Mod: CPTII,,, | Performed by: SURGERY

## 2024-07-02 NOTE — PROGRESS NOTES
44 yo male with liver abscesses   Had a perc drain in [place] because IR wasn't available  Followed by Dr. Dugan in [place] for ID  Has a virtual appointment tomorrow  Still on abx and H2 blocker  Drain has trace clear output today  Recent CT and US reviewed  3 abscesses with gradual improvement  Drain removed today  OK to stop H2 blocker  Abx and f/u per ID

## 2024-07-03 ENCOUNTER — OFFICE VISIT (OUTPATIENT)
Dept: INFECTIOUS DISEASES | Facility: CLINIC | Age: 44
End: 2024-07-03
Payer: COMMERCIAL

## 2024-07-03 DIAGNOSIS — K75.0 ABSCESS OF LIVER: Primary | ICD-10-CM

## 2024-07-03 RX ORDER — AMOXICILLIN AND CLAVULANATE POTASSIUM 875; 125 MG/1; MG/1
1 TABLET, FILM COATED ORAL 2 TIMES DAILY
Qty: 42 TABLET | Refills: 0 | Status: SHIPPED | OUTPATIENT
Start: 2024-07-10 | End: 2024-07-31

## 2024-07-03 NOTE — PROGRESS NOTES
Liver ultrasound showed -  1. Hepatic abscesses have decreased in size compared to the prior CT given differences in modality.    Plan - will complete 1 more week of Rocephin and will switch to PO Augmentin 875mg bid for 3 weeks .  Will need repeat ultrasound in a month to ensure resolution of the abscess

## 2024-07-03 NOTE — PROGRESS NOTES
Subjective     This is a Tele ID note-  Location -[facility]  Patient ID: Nba Montoya is a 43 y.o. male.    Chief Complaint: Follow-up    HPI  Last ID note- 06/14/24       43 y.o. male who was transferred  from [facility] for higher level of care -for liver abscess .   after patient was found to have presence of liver abscesses on CT.  Cultures obtained positive for .  He was initially started on   cefepime, vancomycin, and metronidazole   CT guided biopsy -06/07- Klebsiella  Labs and imaging test  Blood culture- o6/07- Klebsiella      CT -06/10-There are multiple hypoattenuated liver lesions seen which was seen on the prior examination as well.  Liver lesion is seen in the dome of the liver on image 30 series 2.  It measures 4.3 x 3.7 cm.  This area measured 3 cm x 2.7 cm on the prior examination.  Another lesion is seen in the dome of the liver in segment 8 medially.  It measures 3.7 x 3.2 cm.  Previously this area measured 3.2 x 2.6 cm.  There is a large hypoattenuated heterogeneous area seen in the caudate that measures 6.7 x 7 cm.  Previously this area measured 5 cm x 5.3 cm.  These areas were shown to be liver abscesses on prior examination.  Findings are consistent with progression.   CBC -wbc 14     He was seen by IR and had -CT guided drain of a caudate lobe abscess. -06/12 06/26/24-  He was seen via Tele ID-  He was seen in the ED- 06/17 /24  as he had fever at home-    CT abdomen pelvis -06/17/24-  Right hepatic lobe anterior segment complex appearing hypodense collection shows interval size improvement and now measures 2.3 x 3.0 cm and this previously was 4.3 x 3.7 cm.  Right hepatic lobe another posterior aspect septated collection also shows size improvement and now measures 2.6 x 3.5 cm and this previously measured 3.7 x 3.2 cm.  Large septated complex collection centered within the chordal lobe of liver also shows improvement and now measures 5.0 x 5.0 cm and this previously was 6.7  x 7.0 cm.  This contains the end of the drainage catheter.  No new hepatic lesion or collection.  Hepatic volume is within normal limits.     Pancreas spleen are unremarkable.  No acute findings of the gallbladder.  Adrenals are unremarkable.  Kidneys function drain well.  There are no intra-abdominopelvic enlarged lymph nodes.     Stomach is decompressed.  No abnormal dilatation of loops of small bowel and there is no focal or generalized mural thickening appendix is unremarkable.  There is noninflamed diverticulosis coli without pericolonic acute strandings     show drain in place and decreased and liver abscess size, , mild bilateral lower lobe posterior subpleural atelectasis with concern for some infiltrate especially on the right.  He is on Rocephin 2 gram daily with planned EOC -07/05/24.      07/03/24  He reports interval improvement  06/26-  Liver ultrasound-     Impression:     1. Hepatic abscesses have decreased in size compared to the prior CT given differences in modality.     Review of Systems   Constitutional:  Negative for activity change, appetite change and chills.   Psychiatric/Behavioral:  Negative for agitation and behavioral problems.           Objective     Physical Exam  Vitals reviewed.   Constitutional:       Appearance: He is normal weight.            Assessment and Plan     Problem List Items Addressed This Visit       Abscess of liver - Primary     1) Infection:  Liver abscess     2) Discharge Antibiotics:     Intravenous antibiotics:IV Rocephin 2 gram daily      3) Therapy Duration:     3 weeks   Estimated end date of IV antibiotics:    07/05/24CPK (when on Daptomycin)  ESR  CRP     Please send all labs to [facility] .    06/26- will continue Rocephin and will  plan to do liver ultrasound before he ends the regime  Planned EOC -07/05/24     07/3-   Impression:06/26 - liver ultrasound     1. Hepatic abscesses have decreased in size compared to the prior CT given differences in  modality.    Will extend Rocephin until 07/12/24  And will add 3 weeks of Augmentin -  Will do repeat liver ultrasound in one month -by 08/03/24               He can go back to work while on po regime

## 2024-07-03 NOTE — ASSESSMENT & PLAN NOTE
1) Infection:  Liver abscess     2) Discharge Antibiotics:     Intravenous antibiotics:IV Rocephin 2 gram daily      3) Therapy Duration:     3 weeks   Estimated end date of IV antibiotics:    07/05/24CPK (when on Daptomycin)  ESR  CRP     Please send all labs to [facility] .    06/26- will continue Rocephin and will  plan to do liver ultrasound before he ends the regime  Planned EOC -07/05/24     07/3-   Impression:06/26 - liver ultrasound     1. Hepatic abscesses have decreased in size compared to the prior CT given differences in modality.    Will extend Rocephin until 07/12/24  And will add 3 weeks of Augmentin -  Will do repeat liver ultrasound in one month -by 08/03/24

## 2024-07-08 ENCOUNTER — LAB REQUISITION (OUTPATIENT)
Dept: LAB | Facility: HOSPITAL | Age: 44
End: 2024-07-08
Payer: COMMERCIAL

## 2024-07-08 ENCOUNTER — EXTERNAL HOME HEALTH (OUTPATIENT)
Dept: HOME HEALTH SERVICES | Facility: HOSPITAL | Age: 44
End: 2024-07-08
Payer: COMMERCIAL

## 2024-07-08 DIAGNOSIS — K75.0 ABSCESS OF LIVER: ICD-10-CM

## 2024-07-08 LAB
ALBUMIN SERPL-MCNC: 3.7 G/DL (ref 3.5–5)
ALBUMIN/GLOB SERPL: 1 RATIO (ref 1.1–2)
ALP SERPL-CCNC: 86 UNIT/L (ref 40–150)
ALT SERPL-CCNC: 42 UNIT/L (ref 0–55)
ANION GAP SERPL CALC-SCNC: 10 MEQ/L
AST SERPL-CCNC: 28 UNIT/L (ref 5–34)
BASOPHILS # BLD AUTO: 0.06 X10(3)/MCL
BASOPHILS NFR BLD AUTO: 1.1 %
BILIRUB SERPL-MCNC: 0.4 MG/DL
BUN SERPL-MCNC: 13.7 MG/DL (ref 8.9–20.6)
CALCIUM SERPL-MCNC: 9.6 MG/DL (ref 8.4–10.2)
CHLORIDE SERPL-SCNC: 107 MMOL/L (ref 98–107)
CK SERPL-CCNC: 53 U/L (ref 30–200)
CO2 SERPL-SCNC: 23 MMOL/L (ref 22–29)
CREAT SERPL-MCNC: 0.87 MG/DL (ref 0.73–1.18)
CREAT/UREA NIT SERPL: 16
CRP SERPL HS-MCNC: 2.4 MG/L
EOSINOPHIL # BLD AUTO: 0.32 X10(3)/MCL (ref 0–0.9)
EOSINOPHIL NFR BLD AUTO: 5.7 %
ERYTHROCYTE [DISTWIDTH] IN BLOOD BY AUTOMATED COUNT: 13 % (ref 11.5–17)
ERYTHROCYTE [SEDIMENTATION RATE] IN BLOOD: 38 MM/HR (ref 0–15)
GFR SERPLBLD CREATININE-BSD FMLA CKD-EPI: >60 ML/MIN/1.73/M2
GLOBULIN SER-MCNC: 3.8 GM/DL (ref 2.4–3.5)
GLUCOSE SERPL-MCNC: 134 MG/DL (ref 74–100)
HCT VFR BLD AUTO: 43 % (ref 42–52)
HGB BLD-MCNC: 14 G/DL (ref 14–18)
IMM GRANULOCYTES # BLD AUTO: 0.01 X10(3)/MCL (ref 0–0.04)
IMM GRANULOCYTES NFR BLD AUTO: 0.2 %
LYMPHOCYTES # BLD AUTO: 2.08 X10(3)/MCL (ref 0.6–4.6)
LYMPHOCYTES NFR BLD AUTO: 37.1 %
MCH RBC QN AUTO: 29.3 PG (ref 27–31)
MCHC RBC AUTO-ENTMCNC: 32.6 G/DL (ref 33–36)
MCV RBC AUTO: 90 FL (ref 80–94)
MONOCYTES # BLD AUTO: 0.24 X10(3)/MCL (ref 0.1–1.3)
MONOCYTES NFR BLD AUTO: 4.3 %
NEUTROPHILS # BLD AUTO: 2.9 X10(3)/MCL (ref 2.1–9.2)
NEUTROPHILS NFR BLD AUTO: 51.6 %
NRBC BLD AUTO-RTO: 0 %
PLATELET # BLD AUTO: 291 X10(3)/MCL (ref 130–400)
PMV BLD AUTO: 9.7 FL (ref 7.4–10.4)
POTASSIUM SERPL-SCNC: 4 MMOL/L (ref 3.5–5.1)
PROT SERPL-MCNC: 7.5 GM/DL (ref 6.4–8.3)
RBC # BLD AUTO: 4.78 X10(6)/MCL (ref 4.7–6.1)
SODIUM SERPL-SCNC: 140 MMOL/L (ref 136–145)
WBC # BLD AUTO: 5.61 X10(3)/MCL (ref 4.5–11.5)

## 2024-07-08 PROCEDURE — 82550 ASSAY OF CK (CPK): CPT

## 2024-07-08 PROCEDURE — 80053 COMPREHEN METABOLIC PANEL: CPT

## 2024-07-08 PROCEDURE — 86141 C-REACTIVE PROTEIN HS: CPT

## 2024-07-08 PROCEDURE — 85025 COMPLETE CBC W/AUTO DIFF WBC: CPT

## 2024-07-08 PROCEDURE — 85652 RBC SED RATE AUTOMATED: CPT

## 2024-07-09 ENCOUNTER — OFFICE VISIT (OUTPATIENT)
Dept: INFECTIOUS DISEASES | Facility: CLINIC | Age: 44
End: 2024-07-09
Payer: COMMERCIAL

## 2024-07-09 VITALS
BODY MASS INDEX: 30.48 KG/M2 | RESPIRATION RATE: 18 BRPM | WEIGHT: 172 LBS | OXYGEN SATURATION: 98 % | DIASTOLIC BLOOD PRESSURE: 82 MMHG | HEART RATE: 66 BPM | HEIGHT: 63 IN | SYSTOLIC BLOOD PRESSURE: 131 MMHG

## 2024-07-09 DIAGNOSIS — K75.0 ABSCESS OF LIVER: Primary | ICD-10-CM

## 2024-07-09 PROCEDURE — 99999 PR PBB SHADOW E&M-EST. PATIENT-LVL III: CPT | Mod: PBBFAC,,, | Performed by: NURSE PRACTITIONER

## 2024-07-09 PROCEDURE — 99211 OFF/OP EST MAY X REQ PHY/QHP: CPT | Mod: S$GLB,,, | Performed by: NURSE PRACTITIONER

## 2024-07-09 PROCEDURE — 3079F DIAST BP 80-89 MM HG: CPT | Mod: CPTII,S$GLB,, | Performed by: NURSE PRACTITIONER

## 2024-07-09 PROCEDURE — 3075F SYST BP GE 130 - 139MM HG: CPT | Mod: CPTII,S$GLB,, | Performed by: NURSE PRACTITIONER

## 2024-07-09 PROCEDURE — 3008F BODY MASS INDEX DOCD: CPT | Mod: CPTII,S$GLB,, | Performed by: NURSE PRACTITIONER

## 2024-07-09 PROCEDURE — 1159F MED LIST DOCD IN RCRD: CPT | Mod: CPTII,S$GLB,, | Performed by: NURSE PRACTITIONER

## 2024-07-09 RX ORDER — IBUPROFEN 800 MG/1
800 TABLET ORAL
COMMUNITY
Start: 2024-06-04

## 2024-07-09 NOTE — PROGRESS NOTES
Patient presented for f/u here, however is actively being followed by ID in [place] - Dr. Nelson Dugan.  Reached out to him, they will continue to follow the patient, no need for f/u with us unless desired by patient or current ID team.  Informed patient.

## 2024-07-11 ENCOUNTER — TELEPHONE (OUTPATIENT)
Dept: INFECTIOUS DISEASES | Facility: CLINIC | Age: 44
End: 2024-07-11
Payer: COMMERCIAL

## 2024-07-11 NOTE — TELEPHONE ENCOUNTER
Patient stated that [facility] will remove PICC line tomorrow. Patient advised to email me documents for signature from Dr. Dugan. Email address given. Patient verbalized understanding.

## 2024-07-11 NOTE — TELEPHONE ENCOUNTER
----- Message from Gemini Meyers sent at 7/11/2024  9:21 AM CDT -----  Contact: self  Pt is asking for an return call in reference to questions he has about removal of picc line and is needing form filled out to return to work  , please call back at [phone] Thx CJ

## 2024-07-12 ENCOUNTER — TELEPHONE (OUTPATIENT)
Dept: INFECTIOUS DISEASES | Facility: CLINIC | Age: 44
End: 2024-07-12
Payer: COMMERCIAL

## 2024-07-12 NOTE — TELEPHONE ENCOUNTER
Patient informed that document is completed and located at registration desk on 3rd floor at [place] [address]. Patient verbalized understanding.

## 2024-07-18 ENCOUNTER — TELEPHONE (OUTPATIENT)
Dept: INFECTIOUS DISEASES | Facility: CLINIC | Age: 44
End: 2024-07-18
Payer: COMMERCIAL

## 2024-07-19 ENCOUNTER — TELEPHONE (OUTPATIENT)
Dept: INFECTIOUS DISEASES | Facility: CLINIC | Age: 44
End: 2024-07-19
Payer: COMMERCIAL

## 2024-07-19 NOTE — TELEPHONE ENCOUNTER
----- Message from Elizabeth Tay MA sent at 7/19/2024 11:12 AM CDT -----  Contact: patient    ----- Message -----  From: Yao Johnson  Sent: 7/19/2024  11:09 AM CDT  To: Kailee Damon Staff    Nba Jan would like a call back at [phone], in regards to his insurance stating that his form was filled out incorrectly.

## 2024-07-29 ENCOUNTER — DOCUMENT SCAN (OUTPATIENT)
Dept: HOME HEALTH SERVICES | Facility: HOSPITAL | Age: 44
End: 2024-07-29
Payer: COMMERCIAL

## 2024-08-02 ENCOUNTER — DOCUMENT SCAN (OUTPATIENT)
Dept: HOME HEALTH SERVICES | Facility: HOSPITAL | Age: 44
End: 2024-08-02
Payer: COMMERCIAL

## 2024-08-05 ENCOUNTER — TELEPHONE (OUTPATIENT)
Dept: INFECTIOUS DISEASES | Facility: CLINIC | Age: 44
End: 2024-08-05
Payer: COMMERCIAL

## 2024-08-05 ENCOUNTER — HOSPITAL ENCOUNTER (OUTPATIENT)
Dept: RADIOLOGY | Facility: HOSPITAL | Age: 44
Discharge: HOME | End: 2024-08-05
Attending: INTERNAL MEDICINE
Payer: COMMERCIAL

## 2024-08-05 DIAGNOSIS — K75.0 ABSCESS OF LIVER: ICD-10-CM

## 2024-08-05 PROCEDURE — 76705 ECHO EXAM OF ABDOMEN: CPT | Mod: TC

## 2024-08-06 ENCOUNTER — OFFICE VISIT (OUTPATIENT)
Dept: INFECTIOUS DISEASES | Facility: CLINIC | Age: 44
End: 2024-08-06
Payer: COMMERCIAL

## 2024-08-06 DIAGNOSIS — B96.1 BACTEREMIA DUE TO KLEBSIELLA PNEUMONIAE: ICD-10-CM

## 2024-08-06 DIAGNOSIS — I10 HYPERTENSION, UNSPECIFIED TYPE: ICD-10-CM

## 2024-08-06 DIAGNOSIS — K75.0 ABSCESS OF LIVER: Primary | ICD-10-CM

## 2024-08-06 DIAGNOSIS — R78.81 BACTEREMIA DUE TO KLEBSIELLA PNEUMONIAE: ICD-10-CM

## 2024-08-06 PROCEDURE — 99214 OFFICE O/P EST MOD 30 MIN: CPT | Mod: 95,,, | Performed by: INTERNAL MEDICINE
